# Patient Record
Sex: MALE | Race: BLACK OR AFRICAN AMERICAN | NOT HISPANIC OR LATINO | Employment: OTHER | URBAN - METROPOLITAN AREA
[De-identification: names, ages, dates, MRNs, and addresses within clinical notes are randomized per-mention and may not be internally consistent; named-entity substitution may affect disease eponyms.]

---

## 2017-02-21 ENCOUNTER — HOSPITAL ENCOUNTER (OUTPATIENT)
Dept: RADIOLOGY | Facility: CLINIC | Age: 67
Discharge: HOME | End: 2017-02-21
Payer: COMMERCIAL

## 2017-02-21 ENCOUNTER — ALLSCRIPTS OFFICE VISIT (OUTPATIENT)
Dept: OTHER | Facility: OTHER | Age: 67
End: 2017-02-21

## 2017-02-21 DIAGNOSIS — Z12.5 ENCOUNTER FOR SCREENING FOR MALIGNANT NEOPLASM OF PROSTATE: ICD-10-CM

## 2017-02-21 DIAGNOSIS — Z13.1 ENCOUNTER FOR SCREENING FOR DIABETES MELLITUS: ICD-10-CM

## 2017-02-21 DIAGNOSIS — M25.431 EFFUSION OF RIGHT WRIST: ICD-10-CM

## 2017-02-21 DIAGNOSIS — I10 ESSENTIAL (PRIMARY) HYPERTENSION: ICD-10-CM

## 2017-02-21 DIAGNOSIS — C61 MALIGNANT NEOPLASM OF PROSTATE (HCC): ICD-10-CM

## 2017-02-21 PROCEDURE — 73100 X-RAY EXAM OF WRIST: CPT

## 2017-02-23 ENCOUNTER — GENERIC CONVERSION - ENCOUNTER (OUTPATIENT)
Dept: OTHER | Facility: OTHER | Age: 67
End: 2017-02-23

## 2017-03-30 ENCOUNTER — ALLSCRIPTS OFFICE VISIT (OUTPATIENT)
Dept: OTHER | Facility: OTHER | Age: 67
End: 2017-03-30

## 2017-05-15 ENCOUNTER — APPOINTMENT (OUTPATIENT)
Dept: LAB | Facility: CLINIC | Age: 67
End: 2017-05-15
Payer: COMMERCIAL

## 2017-05-15 DIAGNOSIS — Z12.5 ENCOUNTER FOR SCREENING FOR MALIGNANT NEOPLASM OF PROSTATE: ICD-10-CM

## 2017-05-15 DIAGNOSIS — I10 ESSENTIAL (PRIMARY) HYPERTENSION: ICD-10-CM

## 2017-05-15 DIAGNOSIS — C61 MALIGNANT NEOPLASM OF PROSTATE (HCC): ICD-10-CM

## 2017-05-15 DIAGNOSIS — Z13.1 ENCOUNTER FOR SCREENING FOR DIABETES MELLITUS: ICD-10-CM

## 2017-05-15 LAB
ALBUMIN SERPL BCP-MCNC: 3.9 G/DL (ref 3.5–5)
ALP SERPL-CCNC: 115 U/L (ref 46–116)
ALT SERPL W P-5'-P-CCNC: 34 U/L (ref 12–78)
ANION GAP SERPL CALCULATED.3IONS-SCNC: 6 MMOL/L (ref 4–13)
AST SERPL W P-5'-P-CCNC: 33 U/L (ref 5–45)
BILIRUB SERPL-MCNC: 0.6 MG/DL (ref 0.2–1)
BUN SERPL-MCNC: 16 MG/DL (ref 5–25)
CALCIUM SERPL-MCNC: 9.2 MG/DL (ref 8.3–10.1)
CHLORIDE SERPL-SCNC: 104 MMOL/L (ref 100–108)
CHOLEST SERPL-MCNC: 197 MG/DL (ref 50–200)
CO2 SERPL-SCNC: 31 MMOL/L (ref 21–32)
CREAT SERPL-MCNC: 0.86 MG/DL (ref 0.6–1.3)
CREAT UR-MCNC: 144 MG/DL
ERYTHROCYTE [DISTWIDTH] IN BLOOD BY AUTOMATED COUNT: 16.3 % (ref 11.6–15.1)
EST. AVERAGE GLUCOSE BLD GHB EST-MCNC: 160 MG/DL
GFR SERPL CREATININE-BSD FRML MDRD: >60 ML/MIN/1.73SQ M
GLUCOSE P FAST SERPL-MCNC: 132 MG/DL (ref 65–99)
HBA1C MFR BLD: 7.2 % (ref 4.2–6.3)
HCT VFR BLD AUTO: 45.6 % (ref 36.5–49.3)
HDLC SERPL-MCNC: 78 MG/DL (ref 40–60)
HGB BLD-MCNC: 14.5 G/DL (ref 12–17)
LDLC SERPL CALC-MCNC: 104 MG/DL (ref 0–100)
MCH RBC QN AUTO: 24.1 PG (ref 26.8–34.3)
MCHC RBC AUTO-ENTMCNC: 31.8 G/DL (ref 31.4–37.4)
MCV RBC AUTO: 76 FL (ref 82–98)
MICROALBUMIN UR-MCNC: 5.7 MG/L (ref 0–20)
MICROALBUMIN/CREAT 24H UR: 4 MG/G CREATININE (ref 0–30)
PLATELET # BLD AUTO: 228 THOUSANDS/UL (ref 149–390)
PMV BLD AUTO: 10.6 FL (ref 8.9–12.7)
POTASSIUM SERPL-SCNC: 4.5 MMOL/L (ref 3.5–5.3)
PROT SERPL-MCNC: 7.7 G/DL (ref 6.4–8.2)
PSA SERPL-MCNC: 2.8 NG/ML (ref 0–4)
RBC # BLD AUTO: 6.02 MILLION/UL (ref 3.88–5.62)
SODIUM SERPL-SCNC: 141 MMOL/L (ref 136–145)
TRIGL SERPL-MCNC: 75 MG/DL
TSH SERPL DL<=0.05 MIU/L-ACNC: 0.75 UIU/ML (ref 0.36–3.74)
WBC # BLD AUTO: 5.21 THOUSAND/UL (ref 4.31–10.16)

## 2017-05-15 PROCEDURE — 85027 COMPLETE CBC AUTOMATED: CPT

## 2017-05-15 PROCEDURE — 84443 ASSAY THYROID STIM HORMONE: CPT

## 2017-05-15 PROCEDURE — 80053 COMPREHEN METABOLIC PANEL: CPT

## 2017-05-15 PROCEDURE — 82570 ASSAY OF URINE CREATININE: CPT

## 2017-05-15 PROCEDURE — 82043 UR ALBUMIN QUANTITATIVE: CPT

## 2017-05-15 PROCEDURE — 83036 HEMOGLOBIN GLYCOSYLATED A1C: CPT

## 2017-05-15 PROCEDURE — G0103 PSA SCREENING: HCPCS

## 2017-05-15 PROCEDURE — 36415 COLL VENOUS BLD VENIPUNCTURE: CPT

## 2017-05-15 PROCEDURE — 80061 LIPID PANEL: CPT

## 2017-05-16 ENCOUNTER — GENERIC CONVERSION - ENCOUNTER (OUTPATIENT)
Dept: OTHER | Facility: OTHER | Age: 67
End: 2017-05-16

## 2017-06-06 ENCOUNTER — ALLSCRIPTS OFFICE VISIT (OUTPATIENT)
Dept: OTHER | Facility: OTHER | Age: 67
End: 2017-06-06

## 2017-09-01 ENCOUNTER — APPOINTMENT (OUTPATIENT)
Dept: LAB | Facility: CLINIC | Age: 67
End: 2017-09-01
Payer: COMMERCIAL

## 2017-09-01 DIAGNOSIS — E66.9 OBESITY: ICD-10-CM

## 2017-09-01 DIAGNOSIS — R73.01 IMPAIRED FASTING GLUCOSE: ICD-10-CM

## 2017-09-01 LAB
ALBUMIN SERPL BCP-MCNC: 3.8 G/DL (ref 3.5–5)
ALP SERPL-CCNC: 109 U/L (ref 46–116)
ALT SERPL W P-5'-P-CCNC: 33 U/L (ref 12–78)
ANION GAP SERPL CALCULATED.3IONS-SCNC: 8 MMOL/L (ref 4–13)
AST SERPL W P-5'-P-CCNC: 23 U/L (ref 5–45)
BILIRUB SERPL-MCNC: 0.6 MG/DL (ref 0.2–1)
BUN SERPL-MCNC: 14 MG/DL (ref 5–25)
CALCIUM SERPL-MCNC: 9.2 MG/DL (ref 8.3–10.1)
CHLORIDE SERPL-SCNC: 103 MMOL/L (ref 100–108)
CO2 SERPL-SCNC: 29 MMOL/L (ref 21–32)
CREAT SERPL-MCNC: 0.91 MG/DL (ref 0.6–1.3)
ERYTHROCYTE [DISTWIDTH] IN BLOOD BY AUTOMATED COUNT: 16.1 % (ref 11.6–15.1)
EST. AVERAGE GLUCOSE BLD GHB EST-MCNC: 160 MG/DL
GFR SERPL CREATININE-BSD FRML MDRD: 88 ML/MIN/1.73SQ M
GLUCOSE P FAST SERPL-MCNC: 146 MG/DL (ref 65–99)
HBA1C MFR BLD: 7.2 % (ref 4.2–6.3)
HCT VFR BLD AUTO: 45 % (ref 36.5–49.3)
HGB BLD-MCNC: 14.3 G/DL (ref 12–17)
MCH RBC QN AUTO: 23.8 PG (ref 26.8–34.3)
MCHC RBC AUTO-ENTMCNC: 31.8 G/DL (ref 31.4–37.4)
MCV RBC AUTO: 75 FL (ref 82–98)
PLATELET # BLD AUTO: 217 THOUSANDS/UL (ref 149–390)
PMV BLD AUTO: 10.7 FL (ref 8.9–12.7)
POTASSIUM SERPL-SCNC: 4.2 MMOL/L (ref 3.5–5.3)
PROT SERPL-MCNC: 7.8 G/DL (ref 6.4–8.2)
RBC # BLD AUTO: 6.01 MILLION/UL (ref 3.88–5.62)
SODIUM SERPL-SCNC: 140 MMOL/L (ref 136–145)
WBC # BLD AUTO: 5.69 THOUSAND/UL (ref 4.31–10.16)

## 2017-09-01 PROCEDURE — 83036 HEMOGLOBIN GLYCOSYLATED A1C: CPT

## 2017-09-01 PROCEDURE — 80053 COMPREHEN METABOLIC PANEL: CPT

## 2017-09-01 PROCEDURE — 85027 COMPLETE CBC AUTOMATED: CPT

## 2017-09-01 PROCEDURE — 36415 COLL VENOUS BLD VENIPUNCTURE: CPT

## 2017-09-05 ENCOUNTER — GENERIC CONVERSION - ENCOUNTER (OUTPATIENT)
Dept: OTHER | Facility: OTHER | Age: 67
End: 2017-09-05

## 2017-09-19 ENCOUNTER — ALLSCRIPTS OFFICE VISIT (OUTPATIENT)
Dept: OTHER | Facility: OTHER | Age: 67
End: 2017-09-19

## 2017-10-02 ENCOUNTER — ALLSCRIPTS OFFICE VISIT (OUTPATIENT)
Dept: OTHER | Facility: OTHER | Age: 67
End: 2017-10-02

## 2017-10-02 ENCOUNTER — APPOINTMENT (OUTPATIENT)
Dept: LAB | Facility: AMBULARY SURGERY CENTER | Age: 67
End: 2017-10-02
Attending: UROLOGY
Payer: COMMERCIAL

## 2017-10-02 ENCOUNTER — TRANSCRIBE ORDERS (OUTPATIENT)
Dept: ADMINISTRATIVE | Facility: HOSPITAL | Age: 67
End: 2017-10-02

## 2017-10-02 DIAGNOSIS — R97.20 ELEVATED PROSTATE SPECIFIC ANTIGEN (PSA): ICD-10-CM

## 2017-10-02 DIAGNOSIS — R97.20 ELEVATED PROSTATE SPECIFIC ANTIGEN (PSA): Primary | ICD-10-CM

## 2017-10-02 LAB
ANION GAP SERPL CALCULATED.3IONS-SCNC: 9 MMOL/L (ref 4–13)
BUN SERPL-MCNC: 14 MG/DL (ref 5–25)
CALCIUM SERPL-MCNC: 9.6 MG/DL (ref 8.3–10.1)
CHLORIDE SERPL-SCNC: 106 MMOL/L (ref 100–108)
CO2 SERPL-SCNC: 28 MMOL/L (ref 21–32)
CREAT SERPL-MCNC: 0.8 MG/DL (ref 0.6–1.3)
GFR SERPL CREATININE-BSD FRML MDRD: 93 ML/MIN/1.73SQ M
GLUCOSE SERPL-MCNC: 118 MG/DL (ref 65–140)
POTASSIUM SERPL-SCNC: 4.2 MMOL/L (ref 3.5–5.3)
PSA SERPL-MCNC: 3.6 NG/ML (ref 0–4)
SODIUM SERPL-SCNC: 143 MMOL/L (ref 136–145)

## 2017-10-02 PROCEDURE — 80048 BASIC METABOLIC PNL TOTAL CA: CPT

## 2017-10-02 PROCEDURE — 36415 COLL VENOUS BLD VENIPUNCTURE: CPT

## 2017-10-02 PROCEDURE — 84153 ASSAY OF PSA TOTAL: CPT

## 2017-10-03 ENCOUNTER — GENERIC CONVERSION - ENCOUNTER (OUTPATIENT)
Dept: OTHER | Facility: OTHER | Age: 67
End: 2017-10-03

## 2017-10-03 NOTE — CONSULTS
Assessment  1  Prostate cancer (185) (C61)   2  Rising PSA level (790 93) (R97 20)    Plan  Rising PSA level    · (1) BASIC METABOLIC PROFILE; Status:Active; Requested FBC:43ISC0772;    Perform:[facility]; HBE:18CXN2879; Ordered; For:Rising PSA level; Ordered By:Marino Fajardo;   · (Q) PSA, POST-PROSTATECTOMY; Status:Active; Requested WIU:10EJL8320;    Perform:[facility]; NYF:89URM6770; Ordered; For:Rising PSA level; Ordered By:Marino Fajardo;   · * NM BONE SCAN WHOLE BODY; Status:Need Information - Financial Authorization; Requested [id number];    Perform:[facility]; Order Comments:Patient with history of prostate cancer status post radiation hormone therapy, rising PSA (naidr of PSA was 0 5 current PSA is 2 8); YWH:08SVQ3290; Ordered; For:Rising PSA level; Ordered By:Marino Fajardo;   · CT CHEST ABDOMEN PELVIS W WO CONTRAST; Status:Need Information - Financial  Authorization; Requested [id number];    Perform:[facility]; Order Comments:Patient with history of prostate cancer status post radiation hormone therapy, now with increasing PSA (biochemical recurrence) ordering scans for staging and post treatment setting; Due:2018; Ordered; For:Rising PSA level; Ordered By:Marino Fajardo;   · Follow-up visit in 1 month Evaluation and Treatment  Follow-up 1 month with Dr Tess Thomas  at the [facility] Office  Status: Hold For - Scheduling  Requested for: 58EYM4078   Ordered; For: Rising PSA level; Ordered By: Tasha Mccray Performed:  Due: 53JSX8579    Discussion/Summary  Discussion Summary:   I had a productive visit with Mr Cowan Nearing today  Unfortunately we do not have his pathology slides or his War parameters for his history of prostate cancer  But given that he has had radiation and androgen ablation in the past and now has a rising PSA level he meets criteria for biochemical recurrence of disease and may have a local recurrence as well   He has not had restaging or a repeat PSA recently, with his last imaging being done in 2004  did tell him about biochemical recurrence and what could mean in terms of his risk for future metastatic prostate cancer and need for additional therapies  We also discussed cryotherapy under discussed with him that 1 of our partners is an expert in cryotherapy, Dr Vanessa Damico, and that this may be a option after his staging workup has been repeated  PSA, BMP, CT scan chest abdomen pelvis, and a bone scan has been ordered today as part of his restaging for his increasing PSA  would turn to the clinic in 1 months time for a review of these findings as well as to schedule a prostate biopsy to look for recurrence of prostate cancer  Should he have recurrent disease he would not want a salvage prostatectomy which I did discuss with him at length today but would rather opt for cryotherapy also minus androgen deprivation therapy  Chief Complaint  Chief Complaint Free Text Note Form: Patient presents for a history of prostate cancer      History of Present Illness  HPI: Mr Abdirahman Uriarte is a 79-year-old gentleman who is referred to me for a history of prostate cancer   He is seen by Dr Richard Graham for his primary care needs  His other past medical history includes diabetes, hypertension, glaucoma, and obesity  Per his primary care notes he had radiation and chemical castration in the your kidneys been cancer free since 2009  His past surgical history includes colonoscopy, his family history includes diabetes, hypertension, and prostate cancer  He is  and is a never smoker and occasionally uses alcohol and does not use illicit drugs  He has no known drug allergies  was diagnosed around 2004 he did not want to undergo surgery as he does not like invasive therapies   He was treated at [facility] by Dr Felix Gonsalez (who is now retired) with radiation therapy followed by 2 years of chemical castration  He has not had a Lupron injection in quite some time   He states that his moris PSA after treatment was 0 5 nanograms/milliliter and his current PSA in May 2017 was 2 8 nanograms/milliliter representing a biochemical recurrence  He has no symptoms at this time and is voiding well other than getting up 2-3 times at night to urinate he has no dysuria, incontinence, hesitancy, gross hematuria, urgency he has an empty sensation after voiding and his stream quality is good  He is a retired  and is quite informed about his medical conditions and is concerned about his back chemical recurrence and inquired today about further workup and potential cryotherapy for recurrence of disease  AUA symptom score today is 4 indicating mild symptoms and he has mixed feelings about his quality of life with a quality of life score of 3   of his review of systems is negative  Review of Systems  Complete-Male Urology:   Constitutional: No fever or chills, feels well, no tiredness, no recent weight gain or weight loss  Respiratory: No complaints of shortness of breath, no wheezing, no cough, no SOB on exertion, no orthopnea or PND  Cardiovascular: No complaints of slow heart rate, no fast heart rate, no chest pain, no palpitations, no leg claudication, no lower extremity  Gastrointestinal: No complaints of abdominal pain, no constipation, no nausea or vomiting, no diarrhea or bloody stools  Genitourinary: Empty sensation-and-stream quality good, but-no dysuria,-no urinary hesitancy,-no hematuria,-no incontinence-and-no feelings of urinary urgency-   The patient presents with complaints of nocturia (2-3 times)  Musculoskeletal: No complaints of arthralgia, no myalgias, no joint swelling or stiffness, no limb pain or swelling  Integumentary: No complaints of skin rash or skin lesions, no itching, no skin wound, no dry skin  Hematologic/Lymphatic: No complaints of swollen glands, no swollen glands in the neck, does not bleed easily, no easy bruising     Neurological: No compliants of headache, no confusion, no convulsions, no numbness or tingling, no dizziness or fainting, no limb weakness, no difficulty walking  ROS Reviewed:   ROS reviewed  Active Problems  1  Abnormal fasting glucose (790 29) (R73 01)   2  Annual physical exam (V70 0) (Z00 00)   3  Benign essential hypertension (401 1) (I10)   4  Colon cancer screening (V76 51) (Z12 11)   5  Diabetes mellitus out of control (250 02) (E11 65)   6  Encounter for prostate cancer screening (V76 44) (Z12 5)   7  Glaucoma (365 9) (H40 9)   8  Obesity (BMI 30 0-34 9) (278 00) (E66 9)   9  Prostate cancer (185) (C61)   10  Sprain of right wrist, initial encounter (842 00) (S63 501A)   11  Swelling of joint, wrist, right (719 03) (M25 431)    Past Medical History  1  History of malignant neoplasm of prostate (V10 46) (Z85 46)  Active Problems And Past Medical History Reviewed: The active problems and past medical history were reviewed and updated today  Surgical History  1  History of Colonoscopy (Fiberoptic)  Surgical History Reviewed: The surgical history was reviewed and updated today  Family History  Mother    1  Family history of diabetes mellitus (V18 0) (Z83 3)   2  Family history of hypertension (V17 49) (Z82 49)  Father    3  Family history of diabetes mellitus (V18 0) (Z83 3)   4  Family history of hypertension (V17 49) (Z82 49)   5  Family history of malignant neoplasm of prostate (V16 42) (Z80 45)  Sister    10  Family history of diabetes mellitus (V18 0) (Z83 3)   7  Family history of hypertension (V17 49) (Z82 49)  Family History Reviewed: The family history was reviewed and updated today  Social History   · Alcohol use (V49 89) (Z78 9)   ·    · Never a smoker   · No illicit drug use   · Occupation   · Two children  Social History Reviewed: The social history was reviewed and updated today  The social history was reviewed and is unchanged  Current Meds   1  AmLODIPine Besylate 10 MG Oral Tablet;  Take 1 tablet daily; Therapy: 59Jsz5699 to (Evaluate:18Nov2017)  Requested for: 62Kra0079; Last   Rx:07Mov8014 Ordered   2  Aspir-81 81 MG Oral Tablet Delayed Release; take 1 tablet by mouth every day; Therapy: 51Zpw6054 to (Last Rx:27Jde9180)  Requested for: 18Wwk4053 Ordered   3  Latanoprost 0 005 % Ophthalmic Solution; INSTILL 1 DROP IN BOTH EYES AT BEDTIME   Recorded   4  MetFORMIN HCl - 850 MG Oral Tablet; Take 1 tablet twice daily; Therapy: 32Dxk1381 to (Evaluate:18Mar2018)  Requested for: 92Esu0493; Last   Rx:99Bej2275 Ordered  Medication List Reviewed: The medication list was reviewed and updated today  Allergies  1  No Known Drug Allergies    Vitals  Vital Signs    Recorded: 27FQN8071 09:13AM   Heart Rate 70   Systolic 675   Diastolic 86   Height 5 ft 10 in   Weight 220 lb 2 oz   BMI Calculated 31 58   BSA Calculated 2 17     Physical Exam    Constitutional   General appearance: No acute distress, well appearing and well nourished  Pulmonary   Respiratory effort: No increased work of breathing or signs of respiratory distress  Cardiovascular   Examination of extremities for edema and/or varicosities: Normal     Abdomen   Abdomen: Non-tender, no masses  Genitourinary   Anus and perineum: Normal     Scrotum contents: Normal size, no masses  Epididymis: Normal, no masses  Testes: Normal testes, no masses  Urethral meatus: Normal, no lesions  Penis: Abnormal  -Patient does have a 5 moderate ring of his foreskin, this was in paraphimosis on examined him and I reduce this and educated the patient on the need for keeping his foreskin reduced  Digital rectal exam of prostate: Normal size, no masses  -ZOILA shows a 30-40 gram prostate which is smooth without nodules  Digital rectal exam of seminal vesicles: Normal size, no masses  Anus, perineum, and rectum: Normal     Musculoskeletal   Gait and station: Normal     Digits and nails: Normal without clubbing or cyanosis  Inspection/palpation of joints, bones, and muscles: Normal     Skin   Skin and subcutaneous tissue: Normal without rashes or lesions  Lymphatic   Palpation of lymph nodes in groin: Normal     Neurologic   Cranial nerves: Cranial nerves 2-12 intact  Results/Data  AUA Symptom Score 02Oct2017 09:17AM User, Ahs     Test Name Result Flag Reference   AUA Symptom Score (for prostate disease) 4     Incomplete emptying: Not at all (0)  Frequency: Less than 1 time in 5 (1)  Intermittency: Not at all (0)  Urgency: Not at all (0)  Weak-stream: Not at all (0)  Straining: Not at all (0)  Nocturia: About half the time (3)   AUA Symptom Score (for prostate disease) - Quality of Life Due to Urinary Symptoms Mixed     AUA Symptom Score (for prostate disease) - Score Category Mild       Lab Studies Reviewed: Multiple labs, outside films, and records were reviewed for today's visit      Future Appointments    Date/Time Provider Specialty Site   12/21/2017 01:20 PM APPLE Garrido   [address] [facility]     Signatures   Electronically signed by : APPLE Stanley ; Oct  2 2017  9:35AM EST                       (Author)

## 2017-10-25 ENCOUNTER — HOSPITAL ENCOUNTER (OUTPATIENT)
Dept: NUCLEAR MEDICINE | Facility: HOSPITAL | Age: 67
Discharge: HOME | End: 2017-10-25
Attending: UROLOGY
Payer: MEDICARE

## 2017-10-25 ENCOUNTER — HOSPITAL ENCOUNTER (OUTPATIENT)
Dept: NUCLEAR MEDICINE | Facility: HOSPITAL | Age: 67
Discharge: HOME | End: 2017-10-25
Attending: UROLOGY
Payer: COMMERCIAL

## 2017-10-25 ENCOUNTER — HOSPITAL ENCOUNTER (OUTPATIENT)
Dept: CT IMAGING | Facility: HOSPITAL | Age: 67
Discharge: HOME | End: 2017-10-25
Attending: UROLOGY
Payer: MEDICARE

## 2017-10-25 DIAGNOSIS — R97.20 ELEVATED PROSTATE SPECIFIC ANTIGEN (PSA): ICD-10-CM

## 2017-10-25 PROCEDURE — 78306 BONE IMAGING WHOLE BODY: CPT

## 2017-10-25 PROCEDURE — 74177 CT ABD & PELVIS W/CONTRAST: CPT

## 2017-10-25 PROCEDURE — A9503 TC99M MEDRONATE: HCPCS

## 2017-10-25 PROCEDURE — 71260 CT THORAX DX C+: CPT

## 2017-10-25 RX ADMIN — IOHEXOL 100 ML: 350 INJECTION, SOLUTION INTRAVENOUS at 11:40

## 2017-10-31 ENCOUNTER — ALLSCRIPTS OFFICE VISIT (OUTPATIENT)
Dept: OTHER | Facility: OTHER | Age: 67
End: 2017-10-31

## 2017-11-07 ENCOUNTER — GENERIC CONVERSION - ENCOUNTER (OUTPATIENT)
Dept: OTHER | Facility: OTHER | Age: 67
End: 2017-11-07

## 2017-11-15 ENCOUNTER — TRANSCRIBE ORDERS (OUTPATIENT)
Dept: LAB | Facility: CLINIC | Age: 67
End: 2017-11-15

## 2017-11-15 ENCOUNTER — APPOINTMENT (OUTPATIENT)
Dept: LAB | Facility: CLINIC | Age: 67
End: 2017-11-15
Payer: COMMERCIAL

## 2017-11-15 ENCOUNTER — ALLSCRIPTS OFFICE VISIT (OUTPATIENT)
Dept: OTHER | Facility: OTHER | Age: 67
End: 2017-11-15

## 2017-11-15 DIAGNOSIS — R97.20 ELEVATED PROSTATE SPECIFIC ANTIGEN (PSA): ICD-10-CM

## 2017-11-15 DIAGNOSIS — R97.20 ELEVATED PROSTATE SPECIFIC ANTIGEN (PSA): Primary | ICD-10-CM

## 2017-11-15 LAB
ALBUMIN SERPL BCP-MCNC: 3.7 G/DL (ref 3.5–5)
ALP SERPL-CCNC: 112 U/L (ref 46–116)
ALT SERPL W P-5'-P-CCNC: 28 U/L (ref 12–78)
ANION GAP SERPL CALCULATED.3IONS-SCNC: 5 MMOL/L (ref 4–13)
AST SERPL W P-5'-P-CCNC: 20 U/L (ref 5–45)
BASOPHILS # BLD AUTO: 0.03 THOUSANDS/ΜL (ref 0–0.1)
BASOPHILS NFR BLD AUTO: 1 % (ref 0–1)
BILIRUB SERPL-MCNC: 0.4 MG/DL (ref 0.2–1)
BUN SERPL-MCNC: 16 MG/DL (ref 5–25)
CALCIUM SERPL-MCNC: 9 MG/DL (ref 8.3–10.1)
CHLORIDE SERPL-SCNC: 105 MMOL/L (ref 100–108)
CO2 SERPL-SCNC: 29 MMOL/L (ref 21–32)
CREAT SERPL-MCNC: 0.98 MG/DL (ref 0.6–1.3)
EOSINOPHIL # BLD AUTO: 0.07 THOUSAND/ΜL (ref 0–0.61)
EOSINOPHIL NFR BLD AUTO: 1 % (ref 0–6)
ERYTHROCYTE [DISTWIDTH] IN BLOOD BY AUTOMATED COUNT: 16.1 % (ref 11.6–15.1)
GFR SERPL CREATININE-BSD FRML MDRD: 92 ML/MIN/1.73SQ M
GLUCOSE SERPL-MCNC: 189 MG/DL (ref 65–140)
HCT VFR BLD AUTO: 43 % (ref 36.5–49.3)
HGB BLD-MCNC: 13.7 G/DL (ref 12–17)
LYMPHOCYTES # BLD AUTO: 1.64 THOUSANDS/ΜL (ref 0.6–4.47)
LYMPHOCYTES NFR BLD AUTO: 30 % (ref 14–44)
MCH RBC QN AUTO: 24 PG (ref 26.8–34.3)
MCHC RBC AUTO-ENTMCNC: 31.9 G/DL (ref 31.4–37.4)
MCV RBC AUTO: 75 FL (ref 82–98)
MONOCYTES # BLD AUTO: 0.54 THOUSAND/ΜL (ref 0.17–1.22)
MONOCYTES NFR BLD AUTO: 10 % (ref 4–12)
NEUTROPHILS # BLD AUTO: 3.22 THOUSANDS/ΜL (ref 1.85–7.62)
NEUTS SEG NFR BLD AUTO: 58 % (ref 43–75)
PLATELET # BLD AUTO: 214 THOUSANDS/UL (ref 149–390)
PMV BLD AUTO: 10.9 FL (ref 8.9–12.7)
POTASSIUM SERPL-SCNC: 4.3 MMOL/L (ref 3.5–5.3)
PROT SERPL-MCNC: 7.5 G/DL (ref 6.4–8.2)
RBC # BLD AUTO: 5.71 MILLION/UL (ref 3.88–5.62)
SODIUM SERPL-SCNC: 139 MMOL/L (ref 136–145)
WBC # BLD AUTO: 5.5 THOUSAND/UL (ref 4.31–10.16)

## 2017-11-15 PROCEDURE — 80053 COMPREHEN METABOLIC PANEL: CPT

## 2017-11-15 PROCEDURE — 85025 COMPLETE CBC W/AUTO DIFF WBC: CPT

## 2017-11-15 PROCEDURE — 36415 COLL VENOUS BLD VENIPUNCTURE: CPT

## 2017-11-16 NOTE — CONSULTS
Assessment  1  Prostate cancer (185) (C61)   2  Rising PSA level (790 93) (R97 20)    Plan  Rising PSA level    · (1) CBC/PLT/DIFF; Status:Active; Requested for:73Ckq2089; Perform:[facility]; LBH:50DAY0793; Last Updated By:Dk Díaz; 11/15/2017 10:19:29 AM;Ordered; For:Rising PSA level; Ordered By:Alondra Gallegos;   · (1) CBC/PLT/DIFF; Status:Active; Requested for:28Pqw7236; Perform:[facility]; IEX:75BCT5544; Last Updated By:Dk Díaz; 11/15/2017 10:19:36 AM;Ordered; For:Rising PSA level; Ordered By:Alondra Gallegos;   · (1) CBC/PLT/DIFF; Status:Active; Requested for:81Kfp0783;    Perform:[facility]; IWL:52ALK2761; Ordered;PSA level; Ordered By:Alondra Gallegos;   · (1) CBC/PLT/DIFF; Status:Complete; Requested for:Recurring Schedule: 11/15/2017;2/15/2018; 5/15/2018 ; Perform:[facility]; CDB:40QWW4781; Ordered; For:Rising PSA level; Ordered By:Alondra Gallegos;   · (1) COMPREHENSIVE METABOLIC PANEL; Status:Active; Requested for:78Qix9613; Perform:[facility]; ZKE:08ECW1198; Last Updated By:Dk Díaz; 11/15/2017 10:19:47 AM;Ordered; For:Rising PSA level; Ordered By:Alondra Gallegos;   · (1) COMPREHENSIVE METABOLIC PANEL; Status:Active; Requested for:77Kia3490; Perform:[facility]; VPR:26KPQ9953; Last Updated By:Dk Díaz; 11/15/2017 10:19:54 AM;Ordered; For:Rising PSA level; Ordered By:Alondra Gallegos;   · (1) COMPREHENSIVE METABOLIC PANEL; Status:Active; Requested for:09Jdc2955;    Perform:[facility]; VWG:23GYW9023; Ordered;PSA level; Ordered By:Alondra Gallegos;   · (1) COMPREHENSIVE METABOLIC PANEL; Status:Complete; Requested for:RecurringSchedule: 11/15/2017; 2/15/2018; 5/15/2018 ; Perform:[facility]; CPZ:23VHG2240; Ordered;PSA level; Ordered By:Alondra Gallegos;   · Follow-up visit in 3 months Evaluation and Treatment  lab todayFollow-up in 3 months  Status: Complete  Done: 17QXQ6983 09:40AM   Ordered; Rising PSA level; Ordered By: Fozia Bui Performed:  Due: 61VSI9005; Last Updated By: Kyle Hines; 11/15/2017 10:23:23 AM    Discussion/Summary    1, bone lesion, likely hormone sensitive metastatic prostate cancer: TxNxM1, with bone metastatic disease is on right pelvic bone; PSA doubled from 1 8-3 6 within 9 months; evaluated by Urology, plan to start [facility]  the bone lesion is on weight bearing area, recommended patient to receive Zometa to prevent pathological fracture  Side-effect of Zometa include causing hypocalcemia, jaw bone necrosis  Patient will see his dentist for clearance before Zometa  Planning to give Zometa monthly for 1 year then every 3 months  PSA to decrease after Firmagon started, if not, we will consider pelvic MRI or biopsy to confirm metastatic lesion  calcium vitamin-D has patient is now on Firmagonindication for chemo based on the Charrted trial as there is no visceral organ involvement and bone lesion is not diffuse ( < 4)CBC, CMP, PSA todayin 3 months  The patient, patient's family was counseled regarding diagnostic results  total time of encounter was 40 minutes-- and-- 20 minutes was spent counseling  Chief Complaint   I have prostate cancer       History of Present Illness  Patient is a 67-year-old gentleman,  male, with prior medical history of hypertension, prostate cancer early stage diagnosed 13 years ago at the age of 49, patient recall this is a Kami score 3+3 equal to 6, early to moderate stage, status post radiation therapy with Lupron for 6 months  He was initially managed by urologist in [facility], being followed on a yearly basis, until this urologist retired 4 years ago  He is having PSA checked every year by primary physician since then, last treated PSA was 1 8  PSA increased to 2 8 this year, he then presented with pelvic pain, CT and bone scan showed likely metastatic lesion, PSA elevated further to 3 6   Patient was evaluated by urologist Dr Mane Koenig, plan to start [facility], patient was sent to Medical Oncology for comanagement  came in today with his wife, he reported having intermittent back pain however he has a chronic sciatica going on for years, he did not feel any new symptoms  He otherwise remains in his baseline health status, he has no new headaches vision changes, chest pain, cough, hemoptysis, abdominal pain, nausea vomiting, change of urination or bowel movement habits, no bleeding, no lower extremity swelling, his weight and mobility remains the same  He is able to exercise on a daily basis, he has no fever chills, no lumps bumps  has no other cancers except prostate cancer in the past, is nonsmoker, drinks alcohol socially  Family history of multiple myeloma, no prostate cancer history  He retired , he relocated from [facility] to a [place], he has a coming vacation in [place] for about 3 months  Review of Systems   Constitutional: No fever or chills, feels well, no tiredness, no recent weight gain or weight loss  Eyes: No complaints of eye pain, no red eyes, no discharge from eyes, no itchy eyes  ENT: no complaints of earache, no hearing loss, no nosebleeds, no nasal discharge, no sore throat, no hoarseness  Cardiovascular: No complaints of slow heart rate, no fast heart rate, no chest pain, no palpitations, no leg claudication, no lower extremity  Respiratory: No complaints of shortness of breath, no wheezing, no cough, no SOB on exertion, no orthopnea or PND  Gastrointestinal: No complaints of abdominal pain, no constipation, no nausea or vomiting, no diarrhea or bloody stools  Genitourinary: No complaints of dysuria, no incontinence, no hesitancy, no nocturia, no genital lesion, no testicular pain  Musculoskeletal: No complaints of arthralgia, no myalgias, no joint swelling or stiffness, no limb pain or swelling  Integumentary: No complaints of skin rash or skin lesions, no itching, no skin wound, no dry skin    Neurological: No compliants of headache, no confusion, no convulsions, no numbness or tingling, no dizziness or fainting, no limb weakness, no difficulty walking  Psychiatric: Is not suicidal, no sleep disturbances, no anxiety or depression, no change in personality, no emotional problems  Endocrine: No complaints of proptosis, no hot flashes, no muscle weakness, no erectile dysfunction, no deepening of the voice, no feelings of weakness  Hematologic/Lymphatic: No complaints of swollen glands, no swollen glands in the neck, does not bleed easily, no easy bruising  Active Problems    1  Abnormal fasting glucose (790 29) (R73 01)   2  Annual physical exam (V70 0) (Z00 00)   3  Benign essential hypertension (401 1) (I10)   4  Colon cancer screening (V76 51) (Z12 11)   5  Diabetes mellitus out of control (250 02) (E11 65)   6  Encounter for prostate cancer screening (V76 44) (Z12 5)   7  Glaucoma (365 9) (H40 9)   8  Neoplasm of prostate, 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 (185,198 5) (C61,C79 51)   9  Obesity (BMI 30 0-34 9) (278 00) (E66 9)   10  Prostate cancer (185) (C61)   11  Rising PSA level (790 93) (R97 20)   12  Sprain of right wrist, initial encounter (842 00) (S63 501A)   13  Swelling of joint, wrist, right (719 03) (M25 431)    Past Medical History  1  History of malignant neoplasm of prostate (V10 46) (Z85 46)    The active problems and past medical history were reviewed and updated today  Surgical History  1  History of Colonoscopy (Fiberoptic)    The surgical history was reviewed and updated today  Family History  Mother    1  Family history of diabetes mellitus (V18 0) (Z83 3)   2  Family history of hypertension (V17 49) (Z82 49)  Father    3  Family history of cardiac disorder (V17 49) (Z82 49)   4  Family history of diabetes mellitus (V18 0) (Z83 3)   5  Family history of hypertension (V17 49) (Z82 49)   6   Family history of malignant neoplasm of prostate (L04 32) (Z80 45)  Sister 7  Family history of diabetes mellitus (V18 0) (Z83 3)   8  Family history of hypertension (V17 49) (Z82 49)  Brother    5  Family history of multiple myeloma (V16 7) (Z80 7)    The family history was reviewed and updated today  Social History     · Alcohol use (V49 89) (Z78 9)   ·    · Never a smoker   · No illicit drug use   · Occupation   · Retired   ·  from spouse (V61 03) (Z63 5)   · Two children  The social history was reviewed and updated today  Current Meds   1  AmLODIPine Besylate 10 MG Oral Tablet; Take 1 tablet daily; Therapy: 13Rcy8285 to (Evaluate:18Nov2017)  Requested for: 33Uvf1395; Last Rx:09Tmy4638 Ordered   2  CoQ10 100 MG Oral Capsule; Therapy: (Recorded:54Uhu9905) to Recorded   3  MegaRed Omega-3 Krill Oil CAPS; Therapy: (Recorded:41Kis2401) to Recorded   4  MetFORMIN HCl - 850 MG Oral Tablet; Take 1 tablet twice daily; Therapy: 27Fbk1025 to (Evaluate:18Mar2018)  Requested for: 57Stx5654; Last Rx:11Qdu9036 Ordered   5  Saw Palmetto (Serenoa repens) 450 MG CAPS; Therapy: (Recorded:35Twc4824) to Recorded    Allergies  1  No Known Drug Allergies    Vitals  Vital Signs    Recorded: 85HZL1361 09:41AM   Temperature 97 2 F   Heart Rate 72   Respiration 16   Systolic 124   Diastolic 80   Height 5 ft 10 in   Weight 219 lb    BMI Calculated 31 42   BSA Calculated 2 17   O2 Saturation 98   Pain Scale 2       Physical Exam   Constitutional  General appearance: No acute distress, well appearing and well nourished  Eyes  Conjunctiva and lids: No swelling, erythema, or discharge  Ears, Nose, Mouth, and Throat  External inspection of ears and nose: Normal    Pulmonary  Respiratory effort: No increased work of breathing or signs of respiratory distress  Auscultation of lungs: Clear to auscultation, equal breath sounds bilaterally, no wheezes, no rales, no rhonci  Cardiovascular  Palpation of heart: Normal PMI, no thrills     Auscultation of heart: Normal rate and rhythm, normal S1 and S2, without murmurs  Examination of extremities for edema and/or varicosities: Normal    Carotid pulses: Normal    Abdomen  Abdomen: Non-tender, no masses  Liver and spleen: No hepatomegaly or splenomegaly  Lymphatic  Palpation of lymph nodes in neck: No lymphadenopathy  Musculoskeletal  Gait and station: Normal    Neurologic  Cranial nerves: Cranial nerves 2-12 intact  Reflexes: 2+ and symmetric  Sensation: No sensory loss  Psychiatric  Orientation to person, place and time: Normal    Mood and affect: Normal         ECOG 1       Future Appointments    Date/Time Provider Specialty Site   02/13/2018 09:40 AM Lottie Montano MD Hematology Oncology [facility] [place]   12/21/2017 01:20 PM APPLE Batres  [facility] [place] Patient   04/16/2018 10:30 AM APPLE Bonner   Urology [address]       Signatures   Electronically signed by : Naty Shaw MD; Nov 15 2017 10:58AM EST                       (Author)

## 2017-12-18 DIAGNOSIS — E11.65 TYPE 2 DIABETES MELLITUS WITH HYPERGLYCEMIA (HCC): ICD-10-CM

## 2018-01-10 NOTE — RESULT NOTES
Verified Results  (1) CBC/ PLT (NO DIFF) 01Sep2017 09:10AM Luzmaria Vergara Order Number: [id number]     Test Name Result Flag Reference   HEMATOCRIT 45 0 %  36 5-49 3   HEMOGLOBIN 14 3 g/dL  12 0-17 0   MCHC 31 8 g/dL  31 4-37 4   MCH 23 8 pg L 26 8-34 3   MCV 75 fL L 82-98   PLATELET COUNT 936 Thousands/uL  149-390   RBC COUNT 6 01 Million/uL H 3 88-5 62   RDW 16 1 % H 11 6-15 1   WBC COUNT 5 69 Thousand/uL  4 31-10 16   MPV 10 7 fL  8 9-12 7     (1) COMPREHENSIVE METABOLIC PANEL 72LVX4045 13:01EN Luzmaria Vergara Order Number: [id number]     Test Name Result Flag Reference   SODIUM 140 mmol/L  136-145   POTASSIUM 4 2 mmol/L  3 5-5 3   CHLORIDE 103 mmol/L  100-108   CARBON DIOXIDE 29 mmol/L  21-32   ANION GAP (CALC) 8 mmol/L  4-13   BLOOD UREA NITROGEN 14 mg/dL  5-25   CREATININE 0 91 mg/dL  0 60-1 30   Standardized to IDMS reference method   CALCIUM 9 2 mg/dL  8 3-10 1   BILI, TOTAL 0 60 mg/dL  0 20-1 00   ALK PHOSPHATAS 109 U/L     ALT (SGPT) 33 U/L  12-78   Specimen collection should occur prior to Sulfasalazine administration due to the potential for falsely depressed results  AST(SGOT) 23 U/L  5-45   Specimen collection should occur prior to Sulfasalazine administration due to the potential for falsely depressed results  ALBUMIN 3 8 g/dL  3 5-5 0   TOTAL PROTEIN 7 8 g/dL  6 4-8 2   eGFR 88 ml/min/1 73sq m     National Kidney Disease Education Program recommendations are as follows:  GFR calculation is accurate only with a steady state creatinine  Chronic Kidney disease less than 60 ml/min/1 73 sq  meters  Kidney failure less than 15 ml/min/1 73 sq  meters  GLUCOSE FASTING 146 mg/dL H 65-99   Specimen collection should occur prior to Sulfasalazine administration due to the potential for falsely depressed results  Specimen collection should occur prior to Sulfapyridine administration due to the potential for falsely elevated results       (1) HEMOGLOBIN A1C 01Sep2017 09:10AM Demetrice Hui Order Number: [id number]     Test Name Result Flag Reference   HEMOGLOBIN A1C 7 2 % H 4 2-6 3   EST  AVG   GLUCOSE 160 mg/dl

## 2018-01-12 VITALS
HEIGHT: 70 IN | RESPIRATION RATE: 16 BRPM | BODY MASS INDEX: 30.78 KG/M2 | DIASTOLIC BLOOD PRESSURE: 70 MMHG | HEART RATE: 78 BPM | WEIGHT: 215 LBS | SYSTOLIC BLOOD PRESSURE: 130 MMHG

## 2018-01-13 NOTE — RESULT NOTES
Verified Results  XR WRIST 2 VIEW RIGHT [id number] 12:07PM Franny Cramer Order Number: [id number]     Test Name Result Flag Reference   XR WRIST 2 VW RIGHT (Report)     RIGHT WRIST     INDICATION:  Anterior distal forearm swelling, patient fell a few weeks ago  COMPARISON: None     VIEWS: 2; 2 images     FINDINGS:     There is no acute fracture or dislocation  No degenerative changes  No lytic or blastic lesions are seen  Soft tissues are unremarkable  IMPRESSION:     No acute osseous abnormality  Workstation performed: [id number]     Signed by:   Lilliana Francisco DO   2/22/17       Plan  Swelling of joint, wrist, right    · *1 - [facility] Referral  Consult Only: the expectation is  that the referring provider will communicate back to the patient on treatment options  Evaluation and Treatment: the expectation is that the referred to provider will  communicate back to the patient on treatment options    Status: Active  Requested for:  71Rll5929  Care Summary provided  : Yes

## 2018-01-13 NOTE — MISCELLANEOUS
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

## 2018-01-14 VITALS
RESPIRATION RATE: 16 BRPM | OXYGEN SATURATION: 98 % | TEMPERATURE: 97.2 F | WEIGHT: 219 LBS | BODY MASS INDEX: 31.35 KG/M2 | DIASTOLIC BLOOD PRESSURE: 80 MMHG | SYSTOLIC BLOOD PRESSURE: 130 MMHG | HEIGHT: 70 IN | HEART RATE: 72 BPM

## 2018-01-14 VITALS
RESPIRATION RATE: 16 BRPM | SYSTOLIC BLOOD PRESSURE: 160 MMHG | DIASTOLIC BLOOD PRESSURE: 90 MMHG | HEIGHT: 70 IN | WEIGHT: 218 LBS | HEART RATE: 86 BPM | BODY MASS INDEX: 31.21 KG/M2

## 2018-01-14 VITALS
SYSTOLIC BLOOD PRESSURE: 133 MMHG | DIASTOLIC BLOOD PRESSURE: 80 MMHG | WEIGHT: 217 LBS | HEIGHT: 70 IN | HEART RATE: 75 BPM | BODY MASS INDEX: 31.07 KG/M2

## 2018-01-14 NOTE — PROGRESS NOTES
Assessment    1  Annual physical exam (V70 0) (Z00 00)   2  History of malignant neoplasm of prostate (V10 46) (Z85 46)   3  Colon cancer screening (V76 51) (Z12 11)   4  Glaucoma (365 9) (H40 9)   5  Obesity (BMI 30 0-34 9) (278 00) (E66 9)   6  Diabetes mellitus out of control (250 02) (E11 65)    Plan   Abnormal fasting glucose, Obesity (BMI 30 0-34  9)    · (1) HEMOGLOBIN A1C; Status:Active; Requested for:31Dda6465; Annual physical exam, Prostate cancer    · *1 -  [facility]  *  Status: Active  Requested for:  61JLD8142  Care Summary provided  : Yes  Colon cancer screening    · *1 -  [facility]  *  Status: Active   Requested for: 61JGS9523  Care Summary provided  : Yes   · COLONOSCOPY; Status:Active; Requested IUY:57CDV0623;   Diabetes mellitus out of control    · *VB - Foot Exam; Status:Active; Requested PUT:61OEQ9115;   Diabetes mellitus out of control, Obesity (BMI 30 0-34  9)    · 1 - Christin Abad MD, [facility]  (General Surgery) Co-Management  *  Status: Active  Requested  for: 71DEG5492  Care Summary provided  : Yes  Obesity (BMI 30 0-34  9)    · (1) CBC/ PLT (NO DIFF); Status:Active; Requested for:01Sep2017;    · (1) COMPREHENSIVE METABOLIC PANEL; Status:Active; Requested for:97Tkr3504; Bogdan Hua (Optometry) Co-Management  *  Status: Hold For - Scheduling  Requested for: 70ZNW6313  Ordered; For: Glaucoma; Ordered By: Moreno Guerrero  Performed:   Due: 53TPN2407  Follow-up visit in 3 months Evaluation and Treatment  Follow-up  Status: Hold For - Scheduling  Requested for: 10HWW7780  Ordered; For: Abnormal fasting glucose;  Ordered By: Moreno Guerrero  Performed:   Due: 81ZJX5131     Discussion/Summary  Impression: health maintenance visit  Currently, he eats a healthy diet  Prostate cancer screening: prostate cancer screening is current  Colorectal cancer screening: colonoscopy has been ordered   Screening lab work includes glucose and lipid profile  The immunizations are needed and pt wants next time  He was advised to be evaluated by an ophthalmologist  Advice and education were given regarding nutrition and aerobic exercise  Patient discussion: discussed with the patient  He is diabetic A1c 7 2, improved from last year 7 6, discussed to start medication like metformin, he is not willing to start medicine he will work on his diet and weight loss further by joining a weight loss program,  And he says he can follow-up with labs and see how the things go, he is a marathon runner,  Hypertension, on amlodipine,  He does not want vaccines today he says he will get it next time, discussed about Adacel and Pneumovax  The patient was counseled regarding diagnostic results, instructions for management, prognosis, impressions, risks and benefits of treatment options, importance of compliance with treatment  Chief Complaint  PREVENTATIVE, REVIEW LABS      History of Present Illness  , Adult Male: The patient is being seen for a health maintenance evaluation  Social History: He is   Work status: retired  The patient has never smoked cigarettes  He reports drinking 2 drinks per week  He has never used illicit drugs  General Health:   Lifestyle:  He exercises regularly  runner  Screening:   HPI: Physical  He had labs and he is diabetic he says his A1c used to be 10 and he is bringing it down with exercise and diet,  And he knows he is obese he wants to lose weight and wants to join the team for weight loss, rather than taking medicine for diabetes control, he says last year his A1c went down to 7 6 and this year it 7 2,  He has history of prostate cancer, and he wants to see urologist here ,previously treated  no surgery done      Review of Systems    Constitutional: No fever or chills, feels well, no tiredness, no recent weight gain or weight loss  Eyes: No complaints of eye pain, no red eyes, no discharge from eyes, no itchy eyes  ENT: no complaints of earache, no hearing loss, no nosebleeds, no nasal discharge, no sore throat, no hoarseness  Cardiovascular: No complaints of slow heart rate, no fast heart rate, no chest pain, no palpitations, no leg claudication, no lower extremity  Respiratory: No complaints of shortness of breath, no wheezing, no cough, no SOB on exertion, no orthopnea or PND  Gastrointestinal: No complaints of abdominal pain, no constipation, no nausea or vomiting, no diarrhea or bloody stools  Genitourinary: No complaints of dysuria, no incontinence, no hesitancy, no nocturia, no genital lesion, no testicular pain  Musculoskeletal: No complaints of arthralgia, no myalgias, no joint swelling or stiffness, no limb pain or swelling  Integumentary: No complaints of skin rash or skin lesions, no itching, no skin wound, no dry skin  Neurological: No compliants of headache, no confusion, no convulsions, no numbness or tingling, no dizziness or fainting, no limb weakness, no difficulty walking  Psychiatric: Is not suicidal, no sleep disturbances, no anxiety or depression, no change in personality, no emotional problems  Endocrine: No complaints of proptosis, no hot flashes, no muscle weakness, no erectile dysfunction, no deepening of the voice, no feelings of weakness  Hematologic/Lymphatic: No complaints of swollen glands, no swollen glands in the neck, does not bleed easily, no easy bruising  ROS reviewed  Active Problems    1  Benign essential hypertension (401 1) (I10)   2  Encounter for prostate cancer screening (V76 44) (Z12 5)   3  Glaucoma (365 9) (H40 9)   4  Prostate cancer (185) (C61)   5  Sprain of right wrist, initial encounter (842 00) (S63 501A)   6   Swelling of joint, wrist, right (719 03) (M25 431)    Past Medical History    · History of malignant neoplasm of prostate (V10 46) (Z85 46)    Surgical History    · History of Colonoscopy (Fiberoptic)    Family History  Father    · Family history of diabetes mellitus (V18 0) (Z83 3)   · Family history of hypertension (V17 49) (Z82 49)   · Family history of malignant neoplasm of prostate (V16 42) (Z80 42)    Social History    · Alcohol use (V49 89) (Z78 9)   · 2 bear per wk   ·    · Never a smoker   · No illicit drug use   · Occupation   · , retired now   · Two children    Current Meds   1  AmLODIPine Besylate 10 MG Oral Tablet; Take 1 tablet daily  Requested for:   28AAA1076; Last XH:93BTC0658 Ordered   2  Latanoprost 0 005 % Ophthalmic Solution; INSTILL 1 DROP IN BOTH EYES AT   BEDTIME Recorded    Allergies    1  No Known Drug Allergies    Vitals   Recorded: 12UBP8147 10:01AM Recorded: 06Jun2017 09:42AM   Heart Rate  86   Respiration  16   Systolic 021 024   Diastolic 78 70   Height  5 ft 10 in   Weight  221 lb    BMI Calculated  31 71   BSA Calculated  2 18     Physical Exam    Socks and shoes removed, Right Foot Findings: normal foot, no swelling, no erythema  The right toes were normal and had full range of motion  The sensory exam showed normal vibratory sensation at the level of the toes on the right  Normal tactile sensation with monofilament testing throughout the right foot  Socks and shoes removed, Left Foot Findings: normal foot, no swelling, no erythema  The left toes were normal and had full range of motion  The sensory exam showed normal vibratory sensation at the level of the toes on the left  Normal tactile sensation with monofilament testing throughout the left foot  Pulses:   2+ in the dorsalis pedis on the right  Pulses:   2+ in the dorsalis pedis on the left  Constitutional   General appearance: No acute distress, well appearing and well nourished  Head and Face   Head and face: Normal     Palpation of the face and sinuses: No sinus tenderness  Eyes   Conjunctiva and lids: No erythema, swelling or discharge  Pupils and irises: Equal, round, reactive to light      Ophthalmoscopic examination: Normal fundi and optic discs  Ears, Nose, Mouth, and Throat   External inspection of ears and nose: Normal     Otoscopic examination: Tympanic membranes translucent with normal light reflex  Canals patent without erythema  Hearing: Normal     Nasal mucosa, septum, and turbinates: Normal without edema or erythema  Lips, teeth, and gums: Normal, good dentition  Oropharynx: Normal with no erythema, edema, exudate or lesions  Neck   Neck: Supple, symmetric, trachea midline, no masses  Thyroid: Normal, no thyromegaly  Pulmonary   Respiratory effort: No increased work of breathing or signs of respiratory distress  Percussion of chest: Normal     Palpation of chest: Normal     Auscultation of lungs: Clear to auscultation  Cardiovascular   Palpation of heart: Normal PMI, no thrills  Auscultation of heart: Normal rate and rhythm, normal S1 and S2, no murmurs  Carotid pulses: 2+ bilaterally  Abdominal aorta: Normal     Femoral pulses: 2+ bilaterally  Pedal pulses: 2+ bilaterally  Examination of extremities for edema and/or varicosities: Normal     Chest   Breasts: Normal, no dimpling or skin changes appreciated  Palpation of breasts and axillae: Normal, no masses palpated  Chest: Normal     Abdomen   Abdomen: Non-tender, no masses  Liver and spleen: No hepatomegaly or splenomegaly  Lymphatic   Palpation of lymph nodes in neck: No lymphadenopathy  Palpation of lymph nodes in other areas: No lymphadenopathy  Musculoskeletal   Gait and station: Normal     Inspection/palpation of digits and nails: Normal without clubbing or cyanosis  Inspection/palpation of joints, bones, and muscles: Normal     Range of motion: Normal     Stability: Normal     Muscle strength/tone: Normal     Skin   Skin and subcutaneous tissue: Normal without rashes or lesions  Palpation of skin and subcutaneous tissue: Normal turgor      Neurologic   Cranial nerves: Cranial nerves 2-12 intact  Reflexes: 2+ and symmetric  Sensation: No sensory loss  Psychiatric   Judgment and insight: Normal     Orientation to person, place and time: Normal     Recent and remote memory: Intact  Mood and affect: Normal        Results/Data  (1) CBC/ PLT (NO DIFF) [id number] 10:26AM [employer] Order Number: [id number]     Test Name Result Flag Reference   HEMATOCRIT 45 6 %  36 5-49 3   HEMOGLOBIN 14 5 g/dL  12 0-17 0   MCHC 31 8 g/dL  31 4-37 4   MCH 24 1 pg L 26 8-34 3   MCV 76 fL L 82-98   PLATELET COUNT 545 Thousands/uL  149-390   RBC COUNT 6 02 Million/uL H 3 88-5 62   RDW 16 3 % H 11 6-15 1   WBC COUNT 5 21 Thousand/uL  4 31-10 16   MPV 10 6 fL  8 9-12 7     (1) COMPREHENSIVE METABOLIC PANEL [id number] 21:22CS [employer] Order Number: [id number]     Test Name Result Flag Reference   SODIUM 141 mmol/L  136-145   POTASSIUM 4 5 mmol/L  3 5-5 3   CHLORIDE 104 mmol/L  100-108   CARBON DIOXIDE 31 mmol/L  21-32   ANION GAP (CALC) 6 mmol/L  4-13   BLOOD UREA NITROGEN 16 mg/dL  5-25   CREATININE 0 86 mg/dL  0 60-1 30   Standardized to IDMS reference method   CALCIUM 9 2 mg/dL  8 3-10 1   BILI, TOTAL 0 60 mg/dL  0 20-1 00   ALK PHOSPHATAS 115 U/L     ALT (SGPT) 34 U/L  12-78   AST(SGOT) 33 U/L  5-45   ALBUMIN 3 9 g/dL  3 5-5 0   TOTAL PROTEIN 7 7 g/dL  6 4-8 2   eGFR Non-African American      >60 0 ml/min/1 73sq m   [facility] Disease Education Program recommendations are as follows:  GFR calculation is accurate only with a steady state creatinine  Chronic Kidney disease less than 60 ml/min/1 73 sq  meters  Kidney failure less than 15 ml/min/1 73 sq  meters  GLUCOSE FASTING 132 mg/dL H 65-99     (1) HEMOGLOBIN A1C [id number] 10:26AM [employer] Order Number: [id number]     Test Name Result Flag Reference   HEMOGLOBIN A1C 7 2 % H 4 2-6 3   EST  AVG   GLUCOSE 160 mg/dl       (1) LIPID PANEL, FASTING [id number] 10:26AM Karly PAREDES Order Number: [id number]     Test Name Result Flag Reference   CHOLESTEROL 197 mg/dL     HDL,DIRECT 78 mg/dL H 40-60   Specimen collection should occur prior to Metamizole administration due to the potential for falsely depressed results  LDL CHOLESTEROL CALCULATED 104 mg/dL H 0-100   - Patient Instructions: This is a fasting blood test  Water,black tea or black  coffee only after 9:00pm the night before test   Drink 2 glasses of water the morning of test       Triglyceride:         Normal              <150 mg/dl       Borderline High    150-199 mg/dl       High               200-499 mg/dl       Very High          >499 mg/dl  Cholesterol:         Desirable        <200 mg/dl      Borderline High  200-239 mg/dl      High             >239 mg/dl  HDL Cholesterol:        High    >59 mg/dL      Low     <41 mg/dL  LDL CALCULATED:    This screening LDL is a calculated result  It does not have the accuracy of the Direct Measured LDL in the monitoring of patients with hyperlipidemia and/or statin therapy  Direct Measure LDL (JQR222) must be ordered separately in these patients  TRIGLYCERIDES 75 mg/dL  <=150   Specimen collection should occur prior to N-Acetylcysteine or Metamizole administration due to the potential for falsely depressed results  (1) MICROALBUMIN CREATININE RATIO, RANDOM URINE [id number] 10:26AM [employer] Order Number: [id number]     Test Name Result Flag Reference   MICROALBUMIN/ CREAT R 4 mg/g creatinine  0-30   MICROALBUMIN,URINE 5 7 mg/L  0 0-20 0   CREATININE URINE 144 0 mg/dL       (1) TSH [id number] 10:26AM [employer] Order Number: [id number]     Test Name Result Flag Reference   TSH 0 746 uIU/mL  0 358-3 740   - Patient Instructions: This bloodwork is non-fasting  Please drink two glasses of water morning of bloodwork  Patients undergoing fluorescein dye angiography may retain small amounts of fluorescein in the body for 48-72 hours post procedure  Samples containing fluorescein can produce falsely depressed TSH values  If the patient had this procedure,a specimen should be resubmitted post fluorescein clearance  (1) PSA (SCREEN) (Dx V76 44 Screen for Prostate Cancer) [id number] 10:26AM Ilya Barrera Order Number: [id number]     Test Name Result Flag Reference   PROSTATE SPECIFIC ANTIGEN 2 8 ng/mL  0 0-4 0   American Urological Association Guidelines define biochemical recurrence of prostate cancer as a detectable or rising PSA value post-radical prostatectomy that is greater than or equal to 0 2 ng/mL with a second confirmatory level of greater than or equal to 0 2 ng/mL  - Patient Instructions: This test is non-fasting  Please drink two glasses of water morning of bloodwork         Procedure    Procedure:   Results: 20/20 in the right eye with corrective device, 20/20 in the left eye with corrective device      Signatures   Electronically signed by : APPLE Gunderson ; Jun 6 2017 10:24AM EST                       (Author)

## 2018-01-15 VITALS
WEIGHT: 220.13 LBS | DIASTOLIC BLOOD PRESSURE: 86 MMHG | BODY MASS INDEX: 31.51 KG/M2 | HEART RATE: 70 BPM | HEIGHT: 70 IN | SYSTOLIC BLOOD PRESSURE: 142 MMHG

## 2018-01-16 NOTE — RESULT NOTES
Verified Results  (1) CBC/ PLT (NO DIFF) [id number] 10:26AM [employer] Order Number: [id number]     Test Name Result Flag Reference   HEMATOCRIT 45 6 %  36 5-49 3   HEMOGLOBIN 14 5 g/dL  12 0-17 0   MCHC 31 8 g/dL  31 4-37 4   MCH 24 1 pg L 26 8-34 3   MCV 76 fL L 82-98   PLATELET COUNT 321 Thousands/uL  149-390   RBC COUNT 6 02 Million/uL H 3 88-5 62   RDW 16 3 % H 11 6-15 1   WBC COUNT 5 21 Thousand/uL  4 31-10 16   MPV 10 6 fL  8 9-12 7     (1) COMPREHENSIVE METABOLIC PANEL [id number] 86:42OB [employer] Order Number: [id number]     Test Name Result Flag Reference   SODIUM 141 mmol/L  136-145   POTASSIUM 4 5 mmol/L  3 5-5 3   CHLORIDE 104 mmol/L  100-108   CARBON DIOXIDE 31 mmol/L  21-32   ANION GAP (CALC) 6 mmol/L  4-13   BLOOD UREA NITROGEN 16 mg/dL  5-25   CREATININE 0 86 mg/dL  0 60-1 30   Standardized to IDMS reference method   CALCIUM 9 2 mg/dL  8 3-10 1   BILI, TOTAL 0 60 mg/dL  0 20-1 00   ALK PHOSPHATAS 115 U/L     ALT (SGPT) 34 U/L  12-78   AST(SGOT) 33 U/L  5-45   ALBUMIN 3 9 g/dL  3 5-5 0   TOTAL PROTEIN 7 7 g/dL  6 4-8 2   eGFR Non-African American      >60 0 ml/min/1 73sq [facility] Disease Education Program recommendations are as follows:  GFR calculation is accurate only with a steady state creatinine  Chronic Kidney disease less than 60 ml/min/1 73 sq  meters  Kidney failure less than 15 ml/min/1 73 sq  meters  GLUCOSE FASTING 132 mg/dL H 65-99     (1) HEMOGLOBIN A1C [id number] 10:26AM [employer] Order Number: [id number]     Test Name Result Flag Reference   HEMOGLOBIN A1C 7 2 % H 4 2-6 3   EST  AVG   GLUCOSE 160 mg/dl       (1) LIPID PANEL, FASTING [id number] 10:26AM [employer] Order Number: [id number]     Test Name Result Flag Reference   CHOLESTEROL 197 mg/dL     HDL,DIRECT 78 mg/dL H 40-60   Specimen collection should occur prior to Metamizole administration due to the potential for falsely depressed results  LDL CHOLESTEROL CALCULATED 104 mg/dL H 0-100   - Patient Instructions: This is a fasting blood test  Water,black tea or black  coffee only after 9:00pm the night before test   Drink 2 glasses of water the morning of test       Triglyceride:         Normal              <150 mg/dl       Borderline High    150-199 mg/dl       High               200-499 mg/dl       Very High          >499 mg/dl  Cholesterol:         Desirable        <200 mg/dl      Borderline High  200-239 mg/dl      High             >239 mg/dl  HDL Cholesterol:        High    >59 mg/dL      Low     <41 mg/dL  LDL CALCULATED:    This screening LDL is a calculated result  It does not have the accuracy of the Direct Measured LDL in the monitoring of patients with hyperlipidemia and/or statin therapy  Direct Measure LDL (JFK109) must be ordered separately in these patients  TRIGLYCERIDES 75 mg/dL  <=150   Specimen collection should occur prior to N-Acetylcysteine or Metamizole administration due to the potential for falsely depressed results  (1) MICROALBUMIN CREATININE RATIO, RANDOM URINE [id number] 10:26AM [employer] Order Number: [id number]     Test Name Result Flag Reference   MICROALBUMIN/ CREAT R 4 mg/g creatinine  0-30   MICROALBUMIN,URINE 5 7 mg/L  0 0-20 0   CREATININE URINE 144 0 mg/dL       (1) TSH [id number] 10:26AM [employer] Order Number: [id number]     Test Name Result Flag Reference   TSH 0 746 uIU/mL  0 358-3 740   - Patient Instructions: This bloodwork is non-fasting  Please drink two glasses of water morning of bloodwork  Patients undergoing fluorescein dye angiography may retain small amounts of fluorescein in the body for 48-72 hours post procedure  Samples containing fluorescein can produce falsely depressed TSH values  If the patient had this procedure,a specimen should be resubmitted post fluorescein clearance       (1) PSA (SCREEN) (Dx V76 44 Screen for Prostate Cancer) [id number] 10:26AM Marich[employer] Fraga Order Number: [id number]     Test Name Result Flag Reference   PROSTATE SPECIFIC ANTIGEN 2 8 ng/mL  0 0-4 0   American Urological Association Guidelines define biochemical recurrence of prostate cancer as a detectable or rising PSA value post-radical prostatectomy that is greater than or equal to 0 2 ng/mL with a second confirmatory level of greater than or equal to 0 2 ng/mL  - Patient Instructions: This test is non-fasting  Please drink two glasses of water morning of bloodwork

## 2018-01-17 NOTE — CONSULTS
Chief Complaint  " I have prostate cancer "      History of Present Illness  Patient is a 60-year-old gentleman,  male, with prior medical history of hypertension, prostate cancer early stage diagnosed 13 years ago at the age of 49, patient recall this is a Kami score 3+3 equal to 6, early to moderate stage, status post radiation therapy with Lupron for 6 months  He was initially managed by urologist in [facility], being followed on a yearly basis, until this urologist retired 4 years ago  He is having PSA checked every year by primary physician since then, last treated PSA was 1 8  PSA increased to 2 8 this year, he then presented with pelvic pain, CT and bone scan showed likely metastatic lesion, PSA elevated further to 3 6  Patient was evaluated by urologist Dr Cyndi Bobby, plan to start Naseem Maki, patient was sent to Medical Oncology for comanagement  Patient came in today with his wife, he reported having intermittent back pain however he has a chronic sciatica going on for years, he did not feel any new symptoms  He otherwise remains in his baseline health status, he has no new headaches vision changes, chest pain, cough, hemoptysis, abdominal pain, nausea vomiting, change of urination or bowel movement habits, no bleeding, no lower extremity swelling, his weight and mobility remains the same  He is able to exercise on a daily basis, he has no fever chills, no lumps bumps  He has no other cancers except prostate cancer in the past, is nonsmoker, drinks alcohol socially  Family history of multiple myeloma, no prostate cancer history  He retired , he relocated from [place] to a [place], he has a coming vacation in [place] for about 3 months  Review of Systems    Constitutional: No fever or chills, feels well, no tiredness, no recent weight gain or weight loss  Eyes: No complaints of eye pain, no red eyes, no discharge from eyes, no itchy eyes  ENT: no complaints of earache, no hearing loss, no nosebleeds, no nasal discharge, no sore throat, no hoarseness  Cardiovascular: No complaints of slow heart rate, no fast heart rate, no chest pain, no palpitations, no leg claudication, no lower extremity  Respiratory: No complaints of shortness of breath, no wheezing, no cough, no SOB on exertion, no orthopnea or PND  Gastrointestinal: No complaints of abdominal pain, no constipation, no nausea or vomiting, no diarrhea or bloody stools  Genitourinary: No complaints of dysuria, no incontinence, no hesitancy, no nocturia, no genital lesion, no testicular pain  Musculoskeletal: No complaints of arthralgia, no myalgias, no joint swelling or stiffness, no limb pain or swelling  Integumentary: No complaints of skin rash or skin lesions, no itching, no skin wound, no dry skin  Neurological: No compliants of headache, no confusion, no convulsions, no numbness or tingling, no dizziness or fainting, no limb weakness, no difficulty walking  Psychiatric: Is not suicidal, no sleep disturbances, no anxiety or depression, no change in personality, no emotional problems  Endocrine: No complaints of proptosis, no hot flashes, no muscle weakness, no erectile dysfunction, no deepening of the voice, no feelings of weakness  Hematologic/Lymphatic: No complaints of swollen glands, no swollen glands in the neck, does not bleed easily, no easy bruising  Active Problems    1  Abnormal fasting glucose (790 29) (R73 01)   2  Annual physical exam (V70 0) (Z00 00)   3  Benign essential hypertension (401 1) (I10)   4  Colon cancer screening (V76 51) (Z12 11)   5  Diabetes mellitus out of control (250 02) (E11 65)   6  Encounter for prostate cancer screening (V76 44) (Z12 5)   7  Glaucoma (365 9) (H40 9)   8  Neoplasm of prostate, 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   (185,198 5) (C61,C79 51)   9  Obesity (BMI 30 0-34 9) (278 00) (E66 9)   10  Prostate cancer (185) (C61)   11  Rising PSA level (790 93) (R97 20)   12  Sprain of right wrist, initial encounter (842 00) (S63 501A)   13  Swelling of joint, wrist, right (719 03) (M25 431)    Past Medical History    · History of malignant neoplasm of prostate (V10 46) (Z85 46)    The active problems and past medical history were reviewed and updated today  Surgical History    · History of Colonoscopy (Fiberoptic)    The surgical history was reviewed and updated today  Family History    · Family history of diabetes mellitus (V18 0) (Z83 3)   · Family history of hypertension (V17 49) (Z82 49)    · Family history of cardiac disorder (V17 49) (Z82 49)   · Family history of diabetes mellitus (V18 0) (Z83 3)   · Family history of hypertension (V17 49) (Z82 49)   · Family history of malignant neoplasm of prostate (V16 42) (Z80 42)    · Family history of diabetes mellitus (V18 0) (Z83 3)   · Family history of hypertension (V17 49) (Z82 49)    · Family history of multiple myeloma (V16 7) (Z80 7)    The family history was reviewed and updated today  Social History    · Alcohol use (V49 89) (Z78 9)   · 2 bear per wk   ·    · Never a smoker   · No illicit drug use   · Occupation   · , retired now   · Retired   ·  from spouse (V61 03) (Z63 5)   · Two children  The social history was reviewed and updated today  Current Meds   1  AmLODIPine Besylate 10 MG Oral Tablet; Take 1 tablet daily; Therapy: 84Evl5784 to (Evaluate:18Nov2017)  Requested for: 33Sjw4338; Last   Rx:74Fyg2642 Ordered   2  CoQ10 100 MG Oral Capsule; Therapy: (Recorded:56Gwv6511) to Recorded   3  MegaRed Omega-3 Krill Oil CAPS; Therapy: (Recorded:54Axq3655) to Recorded   4  MetFORMIN HCl - 850 MG Oral Tablet; Take 1 tablet twice daily; Therapy: 20Mcy3400 to (Evaluate:18Mar2018)  Requested for: 74Pxk4201; Last   Rx:52Efi3731 Ordered   5  Saw Palmetto (Serenoa repens) 450 MG CAPS;    Therapy: (Recorded:07Zaq3493) to Recorded    Allergies    1  No Known Drug Allergies    Vitals   Recorded: 17OJP5614 09:41AM   Temperature 97 2 F   Heart Rate 72   Respiration 16   Systolic 158   Diastolic 80   Height 5 ft 10 in   Weight 219 lb    BMI Calculated 31 42   BSA Calculated 2 17   O2 Saturation 98   Pain Scale 2     Physical Exam    Constitutional   General appearance: No acute distress, well appearing and well nourished  Eyes   Conjunctiva and lids: No swelling, erythema, or discharge  Ears, Nose, Mouth, and Throat   External inspection of ears and nose: Normal     Pulmonary   Respiratory effort: No increased work of breathing or signs of respiratory distress  Auscultation of lungs: Clear to auscultation, equal breath sounds bilaterally, no wheezes, no rales, no rhonci  Cardiovascular   Palpation of heart: Normal PMI, no thrills  Auscultation of heart: Normal rate and rhythm, normal S1 and S2, without murmurs  Examination of extremities for edema and/or varicosities: Normal     Carotid pulses: Normal     Abdomen   Abdomen: Non-tender, no masses  Liver and spleen: No hepatomegaly or splenomegaly  Lymphatic   Palpation of lymph nodes in neck: No lymphadenopathy  Musculoskeletal   Gait and station: Normal     Neurologic   Cranial nerves: Cranial nerves 2-12 intact  Reflexes: 2+ and symmetric  Sensation: No sensory loss  Psychiatric   Orientation to person, place and time: Normal     Mood and affect: Normal         ECOG 1       Assessment    1  Prostate cancer (185) (C61)   · cancer free since , had radiation and chemical castration in [place]   2  Rising PSA level (790 93) (R97 20)    Plan  Rising PSA level    · (1) CBC/PLT/DIFF; Status:Active; Requested for:44Tzu1692; Perform:[facility]; [id number]; Last Updated By:Kathleen Díaz; 11/15/2017 10:19:29 AM;Ordered; For:Rising PSA level; Ordered By:Dory Gallegos;   · (1) CBC/PLT/DIFF; Status:Active;  Requested for:62Nts9964; Perform:[facility]; TPW:87AEA0553; Last Updated By:Gracie Díaz; 11/15/2017 10:19:36 AM;Ordered; For:Rising PSA level; Ordered By:Joaquina Gallegos;   · (1) CBC/PLT/DIFF; Status:Active; Requested for:52Nep8320;    Perform:[facility]; BKB:54SCR1408; Ordered; For:Rising PSA level; Ordered By:Joaquina Gallegos;   · (1) CBC/PLT/DIFF; Status:Complete; Requested for:Recurring Schedule: 11/15/2017;  2/15/2018; 5/15/2018 ; Perform:[facility]; KGH:99SZM5557; Ordered; For:Rising PSA level; Ordered By:Joaquina Gallegos;   · (1) COMPREHENSIVE METABOLIC PANEL; Status:Active; Requested for:35Hpk4607; Perform:[facility]; TRISTAN:59NBE7765; Last Updated By:Gracie Díaz; 11/15/2017 10:19:47 AM;Ordered; For:Rising PSA level; Ordered By:Joaquina Gallegos;   · (1) COMPREHENSIVE METABOLIC PANEL; Status:Active; Requested for:99Tkv4708; Perform:[facility]; ISX:[id number]; Last Updated By:Gracie Díaz; 11/15/2017 10:19:54 AM;Ordered; For:Rising PSA level; Ordered By:Joaquina Gallegos;   · (1) COMPREHENSIVE METABOLIC PANEL; Status:Active; Requested for:10Cuw7899;    Perform:[facility]; RJJ:44XGZ0111; Ordered; For:Rising PSA level; Ordered By:Joaquina Gallegos;   · (1) COMPREHENSIVE METABOLIC PANEL; Status:Complete; Requested for:Recurring  Schedule: 11/15/2017; 2/15/2018; 5/15/2018 ; Perform:[facility]; LMH:84JNI3602; Ordered; For:Rising PSA level; Ordered By:Joaquina Gallegos;   · Follow-up visit in 3 months Evaluation and Treatment  lab today  Follow-up in 3 months  Status: Complete  Done: 18RTQ7808 09:40AM   Ordered; For: Rising PSA level; Ordered By: Dylan Lucas Performed:  Due: 93TUW1837; Last Updated By: Kristina Wilson; 11/15/2017 10:23:23 AM    Discussion/Summary    1, bone lesion, likely hormone sensitive metastatic prostate cancer: TxNxM1, with bone metastatic disease is on right pelvic bone; PSA doubled from 1 8-3 6 within 9 months; evaluated by Urology, plan to start [facility]    --given the bone lesion is on weight bearing area, recommended patient to receive Zometa to prevent pathological fracture  Side-effect of Zometa include causing hypocalcemia, jaw bone necrosis  Patient will see his dentist for clearance before Zometa  Planning to give Zometa monthly for 1 year then every 3 months   --expecting PSA to decrease after Firmagon started, if not, we will consider pelvic MRI or biopsy to confirm metastatic lesion  --start calcium vitamin-D has patient is now on Firmagon  --no indication for chemo based on the Charrted trial as there is no visceral organ involvement and bone lesion is not diffuse ( < 4)  --check CBC, CMP, PSA today  --follow-up in 3 months  The patient, patient's family was counseled regarding diagnostic results  total time of encounter was 40 minutes and 20 minutes was spent counseling        Signatures   Electronically signed by : Monika Ojeda MD; Nov 15 2017 10:58AM EST                       (Author)

## 2018-01-22 ENCOUNTER — GENERIC CONVERSION - ENCOUNTER (OUTPATIENT)
Dept: OTHER | Facility: OTHER | Age: 68
End: 2018-01-22

## 2018-01-22 VITALS
WEIGHT: 221 LBS | HEART RATE: 86 BPM | BODY MASS INDEX: 31.64 KG/M2 | HEIGHT: 70 IN | DIASTOLIC BLOOD PRESSURE: 78 MMHG | SYSTOLIC BLOOD PRESSURE: 144 MMHG | RESPIRATION RATE: 16 BRPM

## 2018-01-22 VITALS
DIASTOLIC BLOOD PRESSURE: 82 MMHG | BODY MASS INDEX: 31.41 KG/M2 | HEART RATE: 82 BPM | HEIGHT: 70 IN | WEIGHT: 219.38 LBS | SYSTOLIC BLOOD PRESSURE: 126 MMHG

## 2018-01-22 DIAGNOSIS — C61 MALIGNANT NEOPLASM OF PROSTATE (HCC): ICD-10-CM

## 2018-01-24 NOTE — MISCELLANEOUS
Message   Recorded as Task   Date: 01/22/2018 12:56 PM, Created By: Amelie Bay   Task Name: Call Back   Assigned To: Amelie Bay   Regarding Patient: Trino Germain, Status: Active   CommentSheryll Yung - 22 Jan 2018 12:56 PM     TASK CREATED  Patient called  Patient just made an appointment to see Dr Kathy Ortega on 2/21  Patient wanted to know if it was ok for him to get another PSA done before that appointment  Patient aware that Dr Kathy Ortega said that would be ok  Patient aware that order was placed  Active Problems    1  Abnormal fasting glucose (790 29) (R73 01)   2  Annual physical exam (V70 0) (Z00 00)   3  Benign essential hypertension (401 1) (I10)   4  Colon cancer screening (V76 51) (Z12 11)   5  Diabetes mellitus out of control (250 02) (E11 65)   6  Encounter for prostate cancer screening (V76 44) (Z12 5)   7  Glaucoma (365 9) (H40 9)   8  Neoplasm of prostate, 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   (185,198 5) (C61,C79 51)   9  Obesity (BMI 30 0-34 9) (278 00) (E66 9)   10  Prostate cancer (185) (C61)   11  Rising PSA level (790 93) (R97 20)   12  Sprain of right wrist, initial encounter (842 00) (S63 501A)   13  Swelling of joint, wrist, right (719 03) (M25 431)    Current Meds   1  AmLODIPine Besylate 10 MG Oral Tablet; Take 1 tablet daily; Therapy: 52Dpu3909 to (Evaluate:18Nov2017)  Requested for: 35Lya9214; Last   Rx:78Gda2315 Ordered   2  CoQ10 100 MG Oral Capsule; Therapy: (Recorded:82Rhs7466) to Recorded   3  MegaRed Omega-3 Krill Oil CAPS; Therapy: (Recorded:53Fxb4090) to Recorded   4  MetFORMIN HCl - 850 MG Oral Tablet; Take 1 tablet twice daily; Therapy: 29Bxi8263 to (Evaluate:18Mar2018)  Requested for: 59Wwr5673; Last   Rx:50Isn1749 Ordered   5  Saw Palmetto (Serenoa repens) 450 MG CAPS; Therapy: (Recorded:45Fpx4050) to Recorded    Allergies    1   No Known Drug Allergies    Plan  Prostate cancer    · (1) PSA, DIAGNOSTIC (FOLLOW-UP); Status:Active - Retrospective By Protocol  Authorization;  Requested Luke Mathews;     Signatures   Electronically signed by : Brigid Arevalo, ; Jan 22 2018 12:56PM EST                       (Author)

## 2018-02-06 ENCOUNTER — DOCUMENTATION (OUTPATIENT)
Dept: OTHER | Facility: HOSPITAL | Age: 68
End: 2018-02-06

## 2018-02-06 ENCOUNTER — APPOINTMENT (OUTPATIENT)
Dept: LAB | Facility: AMBULARY SURGERY CENTER | Age: 68
End: 2018-02-06
Payer: COMMERCIAL

## 2018-02-06 DIAGNOSIS — R97.20 ELEVATED PROSTATE SPECIFIC ANTIGEN (PSA): ICD-10-CM

## 2018-02-06 DIAGNOSIS — C61 MALIGNANT NEOPLASM OF PROSTATE (HCC): ICD-10-CM

## 2018-02-06 LAB
ALBUMIN SERPL BCP-MCNC: 3.9 G/DL (ref 3.5–5)
ALP SERPL-CCNC: 103 U/L (ref 46–116)
ALT SERPL W P-5'-P-CCNC: 24 U/L (ref 12–78)
ANION GAP SERPL CALCULATED.3IONS-SCNC: 5 MMOL/L (ref 4–13)
AST SERPL W P-5'-P-CCNC: 21 U/L (ref 5–45)
BASOPHILS # BLD AUTO: 0.02 THOUSANDS/ΜL (ref 0–0.1)
BASOPHILS NFR BLD AUTO: 0 % (ref 0–1)
BILIRUB SERPL-MCNC: 0.48 MG/DL (ref 0.2–1)
BUN SERPL-MCNC: 18 MG/DL (ref 5–25)
CALCIUM SERPL-MCNC: 9.2 MG/DL (ref 8.3–10.1)
CHLORIDE SERPL-SCNC: 104 MMOL/L (ref 100–108)
CO2 SERPL-SCNC: 30 MMOL/L (ref 21–32)
CREAT SERPL-MCNC: 0.87 MG/DL (ref 0.6–1.3)
EOSINOPHIL # BLD AUTO: 0.13 THOUSAND/ΜL (ref 0–0.61)
EOSINOPHIL NFR BLD AUTO: 2 % (ref 0–6)
ERYTHROCYTE [DISTWIDTH] IN BLOOD BY AUTOMATED COUNT: 15.4 % (ref 11.6–15.1)
GFR SERPL CREATININE-BSD FRML MDRD: 103 ML/MIN/1.73SQ M
GLUCOSE SERPL-MCNC: 109 MG/DL (ref 65–140)
HCT VFR BLD AUTO: 42.9 % (ref 36.5–49.3)
HGB BLD-MCNC: 14.1 G/DL (ref 12–17)
LYMPHOCYTES # BLD AUTO: 1.98 THOUSANDS/ΜL (ref 0.6–4.47)
LYMPHOCYTES NFR BLD AUTO: 36 % (ref 14–44)
MCH RBC QN AUTO: 25 PG (ref 26.8–34.3)
MCHC RBC AUTO-ENTMCNC: 32.9 G/DL (ref 31.4–37.4)
MCV RBC AUTO: 76 FL (ref 82–98)
MONOCYTES # BLD AUTO: 0.53 THOUSAND/ΜL (ref 0.17–1.22)
MONOCYTES NFR BLD AUTO: 10 % (ref 4–12)
NEUTROPHILS # BLD AUTO: 2.9 THOUSANDS/ΜL (ref 1.85–7.62)
NEUTS SEG NFR BLD AUTO: 52 % (ref 43–75)
NRBC BLD AUTO-RTO: 0 /100 WBCS
PLATELET # BLD AUTO: 215 THOUSANDS/UL (ref 149–390)
PMV BLD AUTO: 11 FL (ref 8.9–12.7)
POTASSIUM SERPL-SCNC: 4 MMOL/L (ref 3.5–5.3)
PROT SERPL-MCNC: 7.9 G/DL (ref 6.4–8.2)
PSA SERPL-MCNC: 4.3 NG/ML (ref 0–4)
RBC # BLD AUTO: 5.64 MILLION/UL (ref 3.88–5.62)
SODIUM SERPL-SCNC: 139 MMOL/L (ref 136–145)
WBC # BLD AUTO: 5.57 THOUSAND/UL (ref 4.31–10.16)

## 2018-02-06 PROCEDURE — 36415 COLL VENOUS BLD VENIPUNCTURE: CPT

## 2018-02-06 PROCEDURE — 85025 COMPLETE CBC W/AUTO DIFF WBC: CPT

## 2018-02-06 PROCEDURE — 84153 ASSAY OF PSA TOTAL: CPT

## 2018-02-06 PROCEDURE — 80053 COMPREHEN METABOLIC PANEL: CPT

## 2018-02-06 NOTE — PROGRESS NOTES
I called Delvis Herrera to discuss with him that his psa has increased again to 4 3  We will try to get firmagon approved for him given his biochemical recurrence and increasing psa status post remote radiation therapy for prostate cancer

## 2018-02-15 ENCOUNTER — APPOINTMENT (OUTPATIENT)
Dept: LAB | Facility: AMBULARY SURGERY CENTER | Age: 68
End: 2018-02-15
Payer: COMMERCIAL

## 2018-02-15 DIAGNOSIS — R97.20 ELEVATED PROSTATE SPECIFIC ANTIGEN (PSA): ICD-10-CM

## 2018-02-15 LAB
ALBUMIN SERPL BCP-MCNC: 3.9 G/DL (ref 3.5–5)
ALP SERPL-CCNC: 104 U/L (ref 46–116)
ALT SERPL W P-5'-P-CCNC: 21 U/L (ref 12–78)
ANION GAP SERPL CALCULATED.3IONS-SCNC: 4 MMOL/L (ref 4–13)
AST SERPL W P-5'-P-CCNC: 19 U/L (ref 5–45)
BASOPHILS # BLD AUTO: 0.03 THOUSANDS/ΜL (ref 0–0.1)
BASOPHILS NFR BLD AUTO: 1 % (ref 0–1)
BILIRUB SERPL-MCNC: 0.39 MG/DL (ref 0.2–1)
BUN SERPL-MCNC: 17 MG/DL (ref 5–25)
CALCIUM SERPL-MCNC: 9.1 MG/DL (ref 8.3–10.1)
CHLORIDE SERPL-SCNC: 106 MMOL/L (ref 100–108)
CO2 SERPL-SCNC: 31 MMOL/L (ref 21–32)
CREAT SERPL-MCNC: 0.83 MG/DL (ref 0.6–1.3)
EOSINOPHIL # BLD AUTO: 0.07 THOUSAND/ΜL (ref 0–0.61)
EOSINOPHIL NFR BLD AUTO: 1 % (ref 0–6)
ERYTHROCYTE [DISTWIDTH] IN BLOOD BY AUTOMATED COUNT: 15.4 % (ref 11.6–15.1)
GFR SERPL CREATININE-BSD FRML MDRD: 105 ML/MIN/1.73SQ M
GLUCOSE P FAST SERPL-MCNC: 113 MG/DL (ref 65–99)
HCT VFR BLD AUTO: 42.7 % (ref 36.5–49.3)
HGB BLD-MCNC: 14 G/DL (ref 12–17)
LYMPHOCYTES # BLD AUTO: 1.8 THOUSANDS/ΜL (ref 0.6–4.47)
LYMPHOCYTES NFR BLD AUTO: 34 % (ref 14–44)
MCH RBC QN AUTO: 25 PG (ref 26.8–34.3)
MCHC RBC AUTO-ENTMCNC: 32.8 G/DL (ref 31.4–37.4)
MCV RBC AUTO: 76 FL (ref 82–98)
MONOCYTES # BLD AUTO: 0.54 THOUSAND/ΜL (ref 0.17–1.22)
MONOCYTES NFR BLD AUTO: 10 % (ref 4–12)
NEUTROPHILS # BLD AUTO: 2.85 THOUSANDS/ΜL (ref 1.85–7.62)
NEUTS SEG NFR BLD AUTO: 54 % (ref 43–75)
NRBC BLD AUTO-RTO: 0 /100 WBCS
PLATELET # BLD AUTO: 231 THOUSANDS/UL (ref 149–390)
PMV BLD AUTO: 11.4 FL (ref 8.9–12.7)
POTASSIUM SERPL-SCNC: 4.7 MMOL/L (ref 3.5–5.3)
PROT SERPL-MCNC: 7.8 G/DL (ref 6.4–8.2)
RBC # BLD AUTO: 5.6 MILLION/UL (ref 3.88–5.62)
SODIUM SERPL-SCNC: 141 MMOL/L (ref 136–145)
WBC # BLD AUTO: 5.3 THOUSAND/UL (ref 4.31–10.16)

## 2018-02-15 PROCEDURE — 80053 COMPREHEN METABOLIC PANEL: CPT

## 2018-02-15 PROCEDURE — 85025 COMPLETE CBC W/AUTO DIFF WBC: CPT

## 2018-02-15 PROCEDURE — 36415 COLL VENOUS BLD VENIPUNCTURE: CPT

## 2018-02-19 ENCOUNTER — OFFICE VISIT (OUTPATIENT)
Dept: FAMILY MEDICINE CLINIC | Facility: CLINIC | Age: 68
End: 2018-02-19
Payer: MEDICARE

## 2018-02-19 VITALS
HEIGHT: 70 IN | WEIGHT: 209 LBS | DIASTOLIC BLOOD PRESSURE: 76 MMHG | SYSTOLIC BLOOD PRESSURE: 128 MMHG | TEMPERATURE: 97.9 F | BODY MASS INDEX: 29.92 KG/M2 | RESPIRATION RATE: 18 BRPM | HEART RATE: 74 BPM

## 2018-02-19 DIAGNOSIS — B02.8 HERPES ZOSTER WITH COMPLICATION: Primary | ICD-10-CM

## 2018-02-19 PROBLEM — C61 PROSTATE CANCER (HCC): Status: ACTIVE | Noted: 2017-02-21

## 2018-02-19 PROBLEM — C79.51 NEOPLASM OF PROSTATE, DISTANT METASTASIS STAGING CATEGORY M1B: METASTASIS TO BONE (HCC): Status: ACTIVE | Noted: 2017-11-07

## 2018-02-19 PROBLEM — S63.501A SPRAIN OF RIGHT WRIST: Status: ACTIVE | Noted: 2017-03-30

## 2018-02-19 PROBLEM — H40.9 GLAUCOMA: Status: ACTIVE | Noted: 2017-02-21

## 2018-02-19 PROBLEM — C61 NEOPLASM OF PROSTATE, DISTANT METASTASIS STAGING CATEGORY M1B: METASTASIS TO BONE (HCC): Status: ACTIVE | Noted: 2017-11-07

## 2018-02-19 PROBLEM — R97.20 RISING PSA LEVEL: Status: ACTIVE | Noted: 2017-10-02

## 2018-02-19 PROBLEM — E66.811 OBESITY (BMI 30.0-34.9): Status: ACTIVE | Noted: 2017-06-06

## 2018-02-19 PROBLEM — M25.431 SWELLING OF JOINT, WRIST, RIGHT: Status: ACTIVE | Noted: 2017-02-21

## 2018-02-19 PROBLEM — E66.9 OBESITY (BMI 30.0-34.9): Status: ACTIVE | Noted: 2017-06-06

## 2018-02-19 PROBLEM — I10 BENIGN ESSENTIAL HYPERTENSION: Status: ACTIVE | Noted: 2017-02-21

## 2018-02-19 PROBLEM — IMO0002 DIABETES MELLITUS OUT OF CONTROL: Status: ACTIVE | Noted: 2017-06-06

## 2018-02-19 PROCEDURE — 99213 OFFICE O/P EST LOW 20 MIN: CPT | Performed by: PHYSICIAN ASSISTANT

## 2018-02-19 RX ORDER — LATANOPROST 50 UG/ML
SOLUTION/ DROPS OPHTHALMIC
Refills: 0 | COMMUNITY
Start: 2018-02-05

## 2018-02-19 RX ORDER — VALACYCLOVIR HYDROCHLORIDE 1 G/1
1000 TABLET, FILM COATED ORAL 3 TIMES DAILY
Qty: 21 TABLET | Refills: 0 | Status: SHIPPED | OUTPATIENT
Start: 2018-02-19 | End: 2018-03-13 | Stop reason: ALTCHOICE

## 2018-02-19 RX ORDER — VITAMIN E 200 UNIT
CAPSULE ORAL
COMMUNITY

## 2018-02-19 RX ORDER — AMLODIPINE BESYLATE 10 MG/1
10 TABLET ORAL DAILY
Refills: 0 | COMMUNITY
Start: 2017-11-26 | End: 2018-03-01 | Stop reason: SDUPTHER

## 2018-02-19 RX ORDER — VITAMIN B COMPLEX
TABLET ORAL
COMMUNITY

## 2018-02-19 NOTE — PROGRESS NOTES
Assessment/Plan:     Diagnoses and all orders for this visit:    Herpes zoster with complication  -     valACYclovir (VALTREX) 1,000 mg tablet; Take 1 tablet (1,000 mg total) by mouth 3 (three) times a day for 7 days        - Valtrex for 7 days to minimize spread and improve symptoms   - Recommended Tylenol as needed for pain management  - Recommended not to itch it and continue with hygiene as tolerated  - Discussed possible complications including postherpetic neuralgia  - Recommended shingles vaccine after it entirely clears up  - Recommended to return if rash spreads beyond its current region, fever, chills, unremitting headaches  Return if rash does not resolve after 4 weeks  Subjective:      Patient ID: Dale Hernandes is a 80 y o  male  Dale Hernandes is presenting today for itchy rash on his right forearm, axilla and right upper chest which started 5 days ago  It started with an itch and tingling before erupting on the skin  He denies fever, chills, any recent exposure to irritating agents, new soaps or detergents  He has been applying neosporin and washing with soap as normal  The neosporin helps the itch mildly  Pt had chicken pox a child and has not had a shingles vaccination  Rash   The affected locations include the right axilla and right arm  The rash is characterized by burning, itchiness, blistering, pain and redness  He was exposed to nothing  Pertinent negatives include no cough, diarrhea, fatigue, fever, rhinorrhea, shortness of breath, sore throat or vomiting  The following portions of the patient's history were reviewed and updated as appropriate: allergies, current medications, past medical history, past social history and problem list     Review of Systems   Constitutional: Negative for activity change, fatigue, fever and unexpected weight change  HENT: Negative for rhinorrhea and sore throat  Respiratory: Negative for cough, shortness of breath and wheezing  Cardiovascular: Negative for chest pain, palpitations and leg swelling  Gastrointestinal: Negative for constipation, diarrhea, nausea and vomiting  Musculoskeletal: Negative for back pain, neck pain and neck stiffness  Skin: Positive for rash  Negative for pallor and wound  Neurological: Negative for dizziness, syncope, weakness, numbness and headaches  Objective:  /76   Pulse 74   Temp 97 9 °F (36 6 °C) (Tympanic)   Resp 18   Ht 5' 10" (1 778 m)   Wt 94 8 kg (209 lb)   BMI 29 99 kg/m²      Physical Exam   Constitutional: He appears well-developed and well-nourished  HENT:   Head: Normocephalic and atraumatic  Right Ear: Tympanic membrane normal    Left Ear: Tympanic membrane normal    Nose: No rhinorrhea  Cardiovascular: Normal rate, regular rhythm and normal heart sounds  Exam reveals no gallop and no friction rub  No murmur heard  Pulmonary/Chest: Effort normal and breath sounds normal  He has no wheezes  He has no rales  Lymphadenopathy:        Head (right side): No submental, no submandibular, no tonsillar, no preauricular and no posterior auricular adenopathy present  Head (left side): No submental, no submandibular, no tonsillar, no preauricular and no posterior auricular adenopathy present  He has no cervical adenopathy  Skin: Rash noted  Rash is vesicular

## 2018-02-24 NOTE — PROGRESS NOTES
Problem List Items Addressed This Visit     Rising PSA level    Neoplasm of prostate, 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 ([facility] [address] ) - Primary              Assessment and plan:       1  Prostate cancer, recurrent after radiation therapy:  Jose L Norman is doing well today  He did have some troubles with insurance coverage but has been approved for Lupron therapy  He is currently undergoing a she goals outbreak and does not wish to have his Lupron injection today, which is a good decision  While he initially wanted [place], today, I did tell him that the mechanism of action while different produces the same and result of castrate testosterone levels  I have sent him for a DEXA scan to look at his baseline bone density while starting androgen deprivation therapy  I have sent him for Casodex therapy as an androgen receptor blockade during Lupron administration to prevent flare  I have recommended that he continue his weightlifting exercise regimen as this will maximize bone health and he is taking calcium and vitamin-D  We will set him up for Lupron injection within 2 weeks while taking an androgen receptor blocker and he will continue to follow with Dr Dax Delgadillo  After his Lupron injection he will follow up with me in approximately 6 months  Mitul Khoury MD      Chief Complaint     No chief complaint on file  History of Present Illness     Nathan Hernandez is a 80 y o  man with a past medical history as listed below with rising PSA after a history of radiation therapy for prostate cancer  His most recent PSA has gone up again and is 4 3 from 02/06/2018  His functional status is good he denies particular bone pain or other decrease in his activities of daily living since the last visit      He denies dysuria, incontinence, hesitancy of urination, gross hematuria, he has occasional urinary urgency, he awakens twice at night to void, he feels empty after voiding, and stream quality is good  He is following with Dr Deion Kern of Hematology Oncology and he does not wish to undergo any further treatment at this time apart from androgen deprivation therapy (he does not want chemotherapy at this time)  Detailed Urologic History     - please refer to HPI    Review of Systems     Review of Systems   Constitutional: Negative  HENT: Negative  Eyes: Negative  Respiratory: Negative  Cardiovascular: Negative  Gastrointestinal: Negative  Endocrine: Negative  Genitourinary: Negative  Nocturia x2   Musculoskeletal: Negative  Skin: Negative  Allergic/Immunologic: Negative  Neurological: Negative  Hematological: Negative  Psychiatric/Behavioral: Negative  Allergies     No Known Allergies    Physical Exam     Physical Exam   Constitutional: He is oriented to person, place, and time  He appears well-developed and well-nourished  No distress  HENT:   Head: Normocephalic and atraumatic  Eyes: EOM are normal  Pupils are equal, round, and reactive to light  Right eye exhibits no discharge  Left eye exhibits no discharge  Neck: Normal range of motion  Neck supple  No tracheal deviation present  No thyromegaly present  Cardiovascular: Normal rate, regular rhythm and intact distal pulses  Pulmonary/Chest: Effort normal  No stridor  No respiratory distress  He has no wheezes  He exhibits tenderness (Patient has chest tenderness around the area of his shingles outbreak, has weeping vesicles and is on valacyclovir for this)  Abdominal: Soft  Bowel sounds are normal  He exhibits no distension and no mass  There is no tenderness  There is no rebound and no guarding  No hernia  Musculoskeletal: Normal range of motion  He exhibits no edema, tenderness or deformity  Lymphadenopathy:     He has no cervical adenopathy  Neurological: He is alert and oriented to person, place, and time  No cranial nerve deficit or sensory deficit   He exhibits normal muscle tone  Coordination normal    Skin: Skin is warm and dry  No rash noted  He is not diaphoretic  No erythema  No pallor  Psychiatric: He has a normal mood and affect  His behavior is normal  Judgment and thought content normal    Nursing note and vitals reviewed  Vital Signs  There were no vitals filed for this visit  Current Medications       Current Outpatient Prescriptions:     amLODIPine (NORVASC) 10 mg tablet, Take 10 mg by mouth daily, Disp: , Rfl: 0    Coenzyme Q10 (COQ10) 100 MG CAPS, Take by mouth, Disp: , Rfl:     latanoprost (XALATAN) 0 005 % ophthalmic solution, INSTILL 1 DROP INTO BOTH EYES AT BEDTIME  FILL ON 01-09-18, Disp: , Rfl: 0    MEGARED OMEGA-3 KRILL  MG CAPS, Take by mouth, Disp: , Rfl:     metFORMIN (GLUCOPHAGE) 850 mg tablet, Take 1 tablet by mouth 2 (two) times a day, Disp: , Rfl:     Saw Palmetto, Serenoa repens, 450 MG CAPS, Take by mouth, Disp: , Rfl:     valACYclovir (VALTREX) 1,000 mg tablet, Take 1 tablet (1,000 mg total) by mouth 3 (three) times a day for 7 days, Disp: 21 tablet, Rfl: 0      Active Problems     Patient Active Problem List   Diagnosis    Sprain of right wrist    Swelling of joint, wrist, right    Rising PSA level    Prostate cancer ([facility] [address] )    Obesity (BMI 30 0-34  9)    Neoplasm of prostate, 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 ([facility] [address] )    Glaucoma    Diabetes mellitus out of control ([facility] [address] )    Benign essential hypertension         Past Medical History     Past Medical History:   Diagnosis Date    Malignant neoplasm of prostate [facility])          Surgical History     Past Surgical History:   Procedure Laterality Date    COLONOSCOPY      9 years ago         Family History     Family History   Problem Relation Age of Onset    Diabetes Mother     Hypertension Mother     Heart disease Father      cardiac disorder    Diabetes Father     Hypertension Father     Prostate cancer Father     Diabetes Sister  Hypertension Sister     Multiple myeloma Brother          Social History     Social History     History   Smoking Status    Never Smoker   Smokeless Tobacco    Never Used         Pertinent Lab Values     Lab Results   Component Value Date    CREATININE 0 83 02/15/2018       Lab Results   Component Value Date    PSA 4 3 (H) 02/06/2018    PSA 3 6 10/02/2017    PSA 2 8 05/15/2017             Pertinent Imaging     Bone scan and CT scan abdomen pelvis from October 2017 have been previously be reviewed by me and show increased radiotracer activity in the right hemipelvis  There is no evidence of soft tissue metastasis

## 2018-02-24 NOTE — PATIENT INSTRUCTIONS
Prostate Cancer   WHAT YOU NEED TO KNOW:   What do I need to know about prostate cancer? The prostate is the male sex gland that helps make semen  It is about the size of a walnut and wraps around the urethra  The urethra is the tube that carries urine from the bladder to the end of the penis  In most cases, prostate cancer is slow growing  What increases my risk for prostate cancer? · Age older than 48 years    · Father or brother with prostate cancer    · Regularly eating fried or high-fat foods    · Eating few fruits and vegetables    · Exposure to high amounts of certain chemicals, such as in cigarette smoke or alkaline batteries    · A sexually transmitted infection (STI)  What are the signs and symptoms of prostate cancer? You may have no symptoms during the early stages  In the later stages, you may have any of the following:  · Trouble starting or stopping the flow of urine    · Feeling the need to urinate often, especially at night    · Pain or a burning feeling when you urinate or ejaculate semen    · Trouble having an erection    · Blood in your urine or semen    · Not being able to urinate at all    · Pain or stiffness in your lower back, hips, or upper thighs  How is prostate cancer diagnosed? · Digital rectal examination (ZOILA)  is a test to check the size and shape of your prostate  Your healthcare provider will insert a gloved finger into your rectum to feel if your prostate is large, firm, or has lumps  · Prostate-specific antigen (PSA)  is a blood test to check PSA levels  These levels may be increased if you have prostate cancer  · A biopsy  is used to take a sample of your prostate gland to be tested for cancer  The sample may also help healthcare providers determine the stage of your cancer  How is prostate cancer treated? If you have early stage cancer, your healthcare provider may recommend that you have frequent tests and regular follow-up visits to watch for changes   You may also need any of the following:  · Hormone therapy  is medicine used to decrease testosterone (male hormone) levels  · Radiation therapy  is used to kill cancer cells with high-energy x-ray beams  You may receive radiation therapy from outside your body or from small beads or rods placed inside your prostate  · Surgery  may be needed, depending on the stage of the cancer  Part or all of your prostate may be removed  You may also need to have some lymph nodes taken out  This may help keep the cancer from spreading to other parts of your body  What can I do to manage my prostate cancer? · Do not smoke  Nicotine can damage blood vessels and make it more difficult to manage your prostate cancer  Smoking also increases your risk for new or returning cancer and delays healing after treatment  Do not use e-cigarettes or smokeless tobacco in place of cigarettes or to help you quit  They still contain nicotine  Ask your healthcare provider for information if you currently smoke and need help quitting  · Limit or do not drink alcohol as directed  Limit alcohol to 2 drinks per day  A drink is 12 ounces of beer, 1½ ounces of liquor, or 5 ounces of wine  · Eat a variety of healthy foods  Healthy foods include fruits, vegetables, whole-grain breads, low-fat dairy products, beans, lean meats, and fish  Your healthcare provider may also recommend changes to the amounts of calcium and vitamin D you have each day  · Manage your weight  Obesity may increase your risk for problems from prostate cancer  Limit or do not have high-calorie foods or drinks  · Exercise as directed  Exercise may help you recover after treatment and may help prevent your prostate cancer from returning  Exercise can also help you manage your weight  Try to get at least 30 minutes of exercise 5 days a week, such as walking  · Ask about sexual activity    Ask your healthcare provider when it is safe for you to start having sex after your treatment  Medicines may be given if you have trouble getting or maintaining an erection  · Manage incontinence  You may have incontinence (trouble controlling when you urinate) after treatment  Ask your healthcare provider for information on managing urinary incontinence  You may be able to gain control over your urination with techniques or medicines  · Drink liquids as directed  Ask how much liquid to drink each day and which liquids are best for you  Drink extra liquids to prevent dehydration  You will also need to replace fluid if you are vomiting or have diarrhea from cancer treatments  Call 911 for any of the following:   · Your leg feels warm, tender, and painful  It may look swollen and red  · You suddenly feel lightheaded and short of breath  · You have chest pain when you take a deep breath or cough  · You cough up blood  When should I contact my healthcare provider? · You have a fever  · You feel you cannot cope with your illness  · You have blood in your urine or have trouble urinating  · You have pain that does not get better after you take your medicine  · You have questions or concerns about your condition or care  CARE AGREEMENT:   You have the right to help plan your care  Learn about your health condition and how it may be treated  Discuss treatment options with your caregivers to decide what care you want to receive  You always have the right to refuse treatment  The above information is an  only  It is not intended as medical advice for individual conditions or treatments  Talk to your doctor, nurse or pharmacist before following any medical regimen to see if it is safe and effective for you  © 2017 [address] [employer] Information is for End User's use only and may not be sold, redistributed or otherwise used for commercial purposes   All illustrations and images included in CareNotes® are the copyrighted property of [employer]  or [employer]  Bone Metastasis   WHAT YOU NEED TO KNOW:   What is bone metastasis? Bone metastasis is cancer that starts in one area and then spreads to a bone  Some examples are lung, breast, thyroid, prostate, and kidney cancers  Bone metastasis often happens in the spine, upper arm or leg bone, ribs, hips, or skull  Your risk for bone metastasis is higher if you have had cancer for a long time  Cancer that spreads to a bone can weaken the bone and increase your risk for fractures  What are the signs and symptoms of bone metastasis? · Bone pain that is worse at night    · Discomfort when you walk or sit, or trouble finding a comfortable position    · Fatigue or weakness    · Bone fractures    · Numb area, trouble moving a body part, or trouble urinating    · Weight loss without trying, or loss of appetite    · High levels of calcium in your blood     · Bleeding or bruising easily    · Back pain  How is bone metastasis diagnosed? Tell your healthcare provider if you feel pain in one area, or in many areas  Tell him if the pain is constant or comes and goes  Describe the pain  For example, it may be dull, achy, or sharp  You may have shooting pain that starts in your back and goes down your leg  Tell your healthcare provider if anything you do makes the pain worse or helps relieve it  You may need any of the following:  · An x-ray, CT, or MRI  may be used to check your bones for tumors  The pictures may also show missing bone or extra bone  Cancer can prevent new bone from forming, or create extra bone  You may be given contrast liquid to help your bones show up better in the pictures  Tell the healthcare provider if you have ever had an allergic reaction to contrast liquid  Do not enter the MRI room with anything metal  Metal can cause serious damage  Tell the healthcare provider if you have any metal in or on your body      · A bone scan , or PET scan, may be used to check your bones  You will be given a liquid called a tracer  The tracer helps problems in the bones show up better in the scan  You may need more than one bone scan to check the tumor's growth over time  · Blood tests  may be used to check for chemicals called tumor markers that are released by the tumor  The tests may also show if you have large amounts of calcium in your blood  Bone cancer can cause the bone to dissolve and release stored calcium  · A biopsy  is a procedure used to take a sample of the tumor to be checked for cancer  How is bone metastasis treated? Treatment will depend on the type of cancer that spread to a bone  The cancer cells will still look and act like the original cancer  It will respond to treatment for that type of cancer  For example, if you had breast cancer that spread to a bone, you will need treatment used for breast cancer  Treatment may shrink the tumor or slow its growth but may not make the cancer go away completely  · Prescription pain medicine  may be given  Ask how to take this medicine safely  · NSAIDs , such as ibuprofen, help decrease swelling, pain, and fever  This medicine is available with or without a doctor's order  NSAIDs can cause stomach bleeding or kidney problems in certain people  If you take blood thinner medicine, always ask your healthcare provider if NSAIDs are safe for you  Always read the medicine label and follow directions  · Medicine  may be given to reduce bone pain, slow damage caused by the cancer, or prevent the cancer from causing the bone to fracture  · Chemotherapy  (chemo) is medicine used to kill cancer cells  · Radiation  uses high-energy rays to kill cancer cells or keep them from growing quickly  · Targeted therapy  is medicine used to kill cancer cells by destroying what is inside the cells  You may have targeted therapy along with chemo or radiation  · Surgery  may be needed to prevent fractures   Screws, plates, or other devices may be used to help make the bone more stable  Surgery may also be used to help a fractured bone heal correctly  Bone cement may be used along with other treatments to strengthen the bone  What can I do to manage my bone metastasis? · Prevent falls  Wear shoes that fit well and have soles that   Wear shoes both inside and outside  Remove objects from walkways and stairs so you do not trip on them  Place cords for telephones and lamps out of the way so that you do not need to walk over them  Remove small rugs or secure them with double-sided tape  Install bright lights in your home  Use night lights to help light paths to the bathroom or kitchen  · Do not smoke  Smoking increases your risk for new or returning cancer, and can cause bone loss  Smoking can also delay healing after treatment  Ask your healthcare provider for information if you currently smoke and need help quitting  · Limit or do not drink alcohol as directed  Alcohol can decrease bone mineral density and weaken your bones  Limit alcohol to 2 drinks per day if you are a man  Limit alcohol to 1 drink per day if you are a woman  A drink of alcohol is 12 ounces of beer, 5 ounces of wine, or 1½ ounces of liquor  · Eat a variety of healthy foods  Healthy foods include fruits, vegetables, lean meats, beans, and low-fat dairy products  Your healthcare provider may recommend that you get more calcium and vitamin D  Calcium and vitamin D work together to build and protect bones  Good sources of calcium are dairy products, broccoli, tofu, almonds, and canned sardines  Vitamin D is in fish oils, some vegetables, and fortified milk, cereal, and bread  Vitamin D is also formed in the skin when it is exposed to the sun  Ask your healthcare provider how much sunlight is safe for you  · Go to physical or occupational therapy as directed  A physical therapist can teach you safe exercises to strengthen your bones and muscles   Strong muscles can help protect bones  An occupational therapist can teach you how to do your daily activities safely  · Keep a pain diary  Include where you feel the pain and if anything helped relieve it  Bring your pain diary to follow-up visits with your healthcare providers  When should I seek immediate care? · You break a bone  · You have sudden sharp pain, or the pain spreads to other areas of your body  · You have new or worsening pain that does not get better with medicine  · You cannot move part of your body, or it is numb  · You have loss of appetite, nausea, or severe thirst, or you are vomiting  · You are urinating more than usual, or you feel tired, weak, confused, or sleepy  · You have back or neck pain, constipation, and trouble urinating  When should I contact my healthcare provider? · You have muscle weakness, or you are unusually tired  · You have new pain that seems to be coming from a bone  · You have questions or concerns about your condition or care  CARE AGREEMENT:   You have the right to help plan your care  Learn about your health condition and how it may be treated  Discuss treatment options with your caregivers to decide what care you want to receive  You always have the right to refuse treatment  The above information is an  only  It is not intended as medical advice for individual conditions or treatments  Talk to your doctor, nurse or pharmacist before following any medical regimen to see if it is safe and effective for you  © 2017 [address] [employer] Information is for End User's use only and may not be sold, redistributed or otherwise used for commercial purposes  All illustrations and images included in CareNotes® are the copyrighted property of [employer]  or [address]

## 2018-02-26 ENCOUNTER — TELEPHONE (OUTPATIENT)
Dept: UROLOGY | Facility: CLINIC | Age: 68
End: 2018-02-26

## 2018-02-26 ENCOUNTER — OFFICE VISIT (OUTPATIENT)
Dept: UROLOGY | Facility: CLINIC | Age: 68
End: 2018-02-26
Payer: MEDICARE

## 2018-02-26 VITALS
HEIGHT: 71 IN | HEART RATE: 88 BPM | DIASTOLIC BLOOD PRESSURE: 80 MMHG | WEIGHT: 208.4 LBS | SYSTOLIC BLOOD PRESSURE: 124 MMHG | BODY MASS INDEX: 29.18 KG/M2

## 2018-02-26 DIAGNOSIS — C61 NEOPLASM OF PROSTATE, DISTANT METASTASIS STAGING CATEGORY M1B: METASTASIS TO BONE (HCC): Primary | ICD-10-CM

## 2018-02-26 DIAGNOSIS — R97.20 RISING PSA LEVEL: ICD-10-CM

## 2018-02-26 DIAGNOSIS — C79.51 NEOPLASM OF PROSTATE, DISTANT METASTASIS STAGING CATEGORY M1B: METASTASIS TO BONE (HCC): Primary | ICD-10-CM

## 2018-02-26 PROCEDURE — 99215 OFFICE O/P EST HI 40 MIN: CPT | Performed by: UROLOGY

## 2018-02-26 RX ORDER — BICALUTAMIDE 50 MG/1
50 TABLET, FILM COATED ORAL DAILY
Qty: 30 TABLET | Refills: 1 | Status: ON HOLD | OUTPATIENT
Start: 2018-02-26 | End: 2018-06-25

## 2018-02-26 NOTE — LETTER
February 26, 2018     Jose Kohler MD  [address]  [place] [address]    Patient: Kavitha Higuera   YOB: 1950   Date of Visit: 2/26/2018       Dear Dr Ryan Offer:    Thank you for referring Kavitha Higuera to me for evaluation  Below are my notes for this consultation  If you have questions, please do not hesitate to call me  I look forward to following your patient along with you  Sincerely,        Franklyn Thomas MD        CC: Compa Vale MD PhD  Franklyn Thomas MD  2/26/2018 10:43 AM  Sign at close encounter       Problem List Items Addressed This Visit     Rising PSA level    Neoplasm of prostate, 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 [facility]) - Primary              Assessment and plan:       1  Prostate cancer, recurrent after radiation therapy:  Sid Collins is doing well today  He did have some troubles with insurance coverage but has been approved for Lupron therapy  He is currently undergoing a she goals outbreak and does not wish to have his Lupron injection today, which is a good decision  While he initially wanted [place], today, I did tell him that the mechanism of action while different produces the same and result of castrate testosterone levels  I have sent him for a DEXA scan to look at his baseline bone density while starting androgen deprivation therapy  I have sent him for Casodex therapy as an androgen receptor blockade during Lupron administration to prevent flare  I have recommended that he continue his weightlifting exercise regimen as this will maximize bone health and he is taking calcium and vitamin-D  We will set him up for Lupron injection within 2 weeks while taking an androgen receptor blocker and he will continue to follow with Dr Scott Rizo  After his Lupron injection he will follow up with me in approximately 6 months  Franklyn Thomas MD      Chief Complaint     No chief complaint on file          History of Present Illness oDminique Benitez is a 80 y o  man with a past medical history as listed below with rising PSA after a history of radiation therapy for prostate cancer  His most recent PSA has gone up again and is 4 3 from 02/06/2018  His functional status is good he denies particular bone pain or other decrease in his activities of daily living since the last visit  He denies dysuria, incontinence, hesitancy of urination, gross hematuria, he has occasional urinary urgency, he awakens twice at night to void, he feels empty after voiding, and stream quality is good  He is following with Dr Ava Ruiz of Hematology Oncology and he does not wish to undergo any further treatment at this time apart from androgen deprivation therapy (he does not want chemotherapy at this time)  Detailed Urologic History     - please refer to HPI    Review of Systems     Review of Systems   Constitutional: Negative  HENT: Negative  Eyes: Negative  Respiratory: Negative  Cardiovascular: Negative  Gastrointestinal: Negative  Endocrine: Negative  Genitourinary: Negative  Nocturia x2   Musculoskeletal: Negative  Skin: Negative  Allergic/Immunologic: Negative  Neurological: Negative  Hematological: Negative  Psychiatric/Behavioral: Negative  Allergies     No Known Allergies    Physical Exam     Physical Exam   Constitutional: He is oriented to person, place, and time  He appears well-developed and well-nourished  No distress  HENT:   Head: Normocephalic and atraumatic  Eyes: EOM are normal  Pupils are equal, round, and reactive to light  Right eye exhibits no discharge  Left eye exhibits no discharge  Neck: Normal range of motion  Neck supple  No tracheal deviation present  No thyromegaly present  Cardiovascular: Normal rate, regular rhythm and intact distal pulses  Pulmonary/Chest: Effort normal  No stridor  No respiratory distress  He has no wheezes   He exhibits tenderness (Patient has chest tenderness around the area of his shingles outbreak, has weeping vesicles and is on valacyclovir for this)  Abdominal: Soft  Bowel sounds are normal  He exhibits no distension and no mass  There is no tenderness  There is no rebound and no guarding  No hernia  Musculoskeletal: Normal range of motion  He exhibits no edema, tenderness or deformity  Lymphadenopathy:     He has no cervical adenopathy  Neurological: He is alert and oriented to person, place, and time  No cranial nerve deficit or sensory deficit  He exhibits normal muscle tone  Coordination normal    Skin: Skin is warm and dry  No rash noted  He is not diaphoretic  No erythema  No pallor  Psychiatric: He has a normal mood and affect  His behavior is normal  Judgment and thought content normal    Nursing note and vitals reviewed  Vital Signs  There were no vitals filed for this visit  Current Medications       Current Outpatient Prescriptions:     amLODIPine (NORVASC) 10 mg tablet, Take 10 mg by mouth daily, Disp: , Rfl: 0    Coenzyme Q10 (COQ10) 100 MG CAPS, Take by mouth, Disp: , Rfl:     latanoprost (XALATAN) 0 005 % ophthalmic solution, INSTILL 1 DROP INTO BOTH EYES AT BEDTIME  FILL ON 01-09-18, Disp: , Rfl: 0    MEGARED OMEGA-3 KRILL  MG CAPS, Take by mouth, Disp: , Rfl:     metFORMIN (GLUCOPHAGE) 850 mg tablet, Take 1 tablet by mouth 2 (two) times a day, Disp: , Rfl:     Saw Palmetto, Serenoa repens, 450 MG CAPS, Take by mouth, Disp: , Rfl:     valACYclovir (VALTREX) 1,000 mg tablet, Take 1 tablet (1,000 mg total) by mouth 3 (three) times a day for 7 days, Disp: 21 tablet, Rfl: 0      Active Problems     Patient Active Problem List   Diagnosis    Sprain of right wrist    Swelling of joint, wrist, right    Rising PSA level    Pro[facility] [address] )    Obesity (BMI 30 0-34  9)    Neoplasm of prostate, 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 ([address] )    Glaucoma    Diabetes mellitus out of control ([facility] [address] )    Benign essential hypertension         Past Medical History     Past Medical History:   Diagnosis Date    Malignant neoplasm of prostate [facility])          Surgical History     Past Surgical History:   Procedure Laterality Date    COLONOSCOPY      9 years ago         Family History     Family History   Problem Relation Age of Onset    Diabetes Mother     Hypertension Mother     Heart disease Father      cardiac disorder    Diabetes Father     Hypertension Father     Prostate cancer Father     Diabetes Sister     Hypertension Sister     Multiple myeloma Brother          Social History     Social History     History   Smoking Status    Never Smoker   Smokeless Tobacco    Never Used         Pertinent Lab Values     Lab Results   Component Value Date    CREATININE 0 83 02/15/2018       Lab Results   Component Value Date    PSA 4 3 (H) 02/06/2018    PSA 3 6 10/02/2017    PSA 2 8 05/15/2017             Pertinent Imaging     Bone scan and CT scan abdomen pelvis from October 2017 have been previously be reviewed by me and show increased radiotracer activity in the right hemipelvis  There is no evidence of soft tissue metastasis

## 2018-02-26 NOTE — TELEPHONE ENCOUNTER
Spoke with patient, scheduled 3/15 at 0830 in the morning at the [facility] location  Pt aware and confirmed appt details

## 2018-02-26 NOTE — TELEPHONE ENCOUNTER
Patient has Medicare as his primary per patient no auth needed office stock ok    Thanks  Giovanni Garcia

## 2018-02-28 ENCOUNTER — TRANSCRIBE ORDERS (OUTPATIENT)
Dept: RADIOLOGY | Facility: CLINIC | Age: 68
End: 2018-02-28

## 2018-03-01 DIAGNOSIS — I10 ESSENTIAL HYPERTENSION: Primary | ICD-10-CM

## 2018-03-01 RX ORDER — AMLODIPINE BESYLATE 10 MG/1
10 TABLET ORAL DAILY
Qty: 30 TABLET | Refills: 0 | Status: SHIPPED | OUTPATIENT
Start: 2018-03-01 | End: 2018-03-27 | Stop reason: SDUPTHER

## 2018-03-13 ENCOUNTER — OFFICE VISIT (OUTPATIENT)
Dept: FAMILY MEDICINE CLINIC | Facility: CLINIC | Age: 68
End: 2018-03-13
Payer: MEDICARE

## 2018-03-13 VITALS
RESPIRATION RATE: 16 BRPM | DIASTOLIC BLOOD PRESSURE: 74 MMHG | OXYGEN SATURATION: 97 % | HEIGHT: 71 IN | WEIGHT: 207.4 LBS | BODY MASS INDEX: 29.03 KG/M2 | SYSTOLIC BLOOD PRESSURE: 132 MMHG | HEART RATE: 68 BPM

## 2018-03-13 DIAGNOSIS — C79.51 NEOPLASM OF PROSTATE, DISTANT METASTASIS STAGING CATEGORY M1B: METASTASIS TO BONE (HCC): ICD-10-CM

## 2018-03-13 DIAGNOSIS — IMO0001 UNCONTROLLED TYPE 2 DIABETES MELLITUS WITHOUT COMPLICATION, WITHOUT LONG-TERM CURRENT USE OF INSULIN: ICD-10-CM

## 2018-03-13 DIAGNOSIS — C61 NEOPLASM OF PROSTATE, DISTANT METASTASIS STAGING CATEGORY M1B: METASTASIS TO BONE (HCC): ICD-10-CM

## 2018-03-13 DIAGNOSIS — C61 PROSTATE CANCER (HCC): ICD-10-CM

## 2018-03-13 DIAGNOSIS — Z12.11 COLON CANCER SCREENING: ICD-10-CM

## 2018-03-13 DIAGNOSIS — I10 BENIGN ESSENTIAL HYPERTENSION: Primary | ICD-10-CM

## 2018-03-13 PROCEDURE — 99214 OFFICE O/P EST MOD 30 MIN: CPT | Performed by: FAMILY MEDICINE

## 2018-03-13 PROCEDURE — 1101F PT FALLS ASSESS-DOCD LE1/YR: CPT | Performed by: FAMILY MEDICINE

## 2018-03-13 NOTE — PROGRESS NOTES
Assessment/Plan:    Problem List Items Addressed This Visit        Endocrine    Diabetes mellitus out of control ([facility] )     Needs to do his labs and continue metformin         Relevant Orders    HEMOGLOBIN A1C W/ EAG ESTIMATION    Lipid panel    TSH, 3rd generation    Microalbumin / creatinine urine ratio       Cardiovascular and Mediastinum    Benign essential hypertension - Primary     Hypertension is stable, continue amlodipine         Relevant Orders    TSH, 3rd generation       Genitourinary    Prostate cancer ([facility] )    Neoplasm of prostate, 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 [facility])    Relevant Orders    Spirometry pre and post bronchodilator       Other    Colon cancer screening    Relevant Orders    Cologuard          Chief Complaint   Patient presents with    Follow-up     blood work results       Subjective:   Patient ID: Aftab Tang is a 80 y o  male  He says he is taking all his medications, he did not go for blood work, he has prostate cancer with mets to the bone and he is getting treatment from urologist and he is getting Lupron every 6 months, and he is going for another bone scan before he will see the urologist,  He has glaucoma and follow-up with ophthalmologist regularly and he denies any retinopathy  For hypertension he takes amlodipine regularly and denies any new symptoms  He does regular exercise and he does not get any chest pain or shortness of breath and he plans to do marathon running, he wants to get his pulmonary function test   He used to smoke when he was teenager  He had colonoscopy 9 years ago but now he wants to have cologaurd ,         Review of Systems   Constitutional: Negative for activity change, appetite change, chills, diaphoresis, fatigue, fever and unexpected weight change     HENT: Negative for congestion, dental problem, ear discharge, ear pain, facial swelling, hearing loss, mouth sores, nosebleeds, postnasal drip, rhinorrhea, sinus pain, sinus pressure, sneezing, sore throat, trouble swallowing and voice change  Eyes: Negative for photophobia, pain, discharge, redness and itching  Respiratory: Negative for cough, chest tightness, shortness of breath and wheezing  Cardiovascular: Negative for chest pain, palpitations and leg swelling  Gastrointestinal: Negative for abdominal distention, abdominal pain, blood in stool, constipation, diarrhea and nausea  Endocrine: Negative for cold intolerance, heat intolerance, polydipsia, polyphagia and polyuria  Genitourinary: Negative for dysuria, flank pain, frequency, hematuria and urgency  Musculoskeletal: Negative for arthralgias, back pain, myalgias and neck pain  Skin: Negative for color change and pallor  Allergic/Immunologic: Negative for environmental allergies and food allergies  Neurological: Negative for dizziness, weakness, light-headedness, numbness and headaches  Hematological: Negative for adenopathy  Does not bruise/bleed easily  Psychiatric/Behavioral: Negative for behavioral problems, sleep disturbance and suicidal ideas  The patient is not nervous/anxious  Objective:  Physical Exam   Constitutional: He is oriented to person, place, and time  He appears well-developed and well-nourished  HENT:   Head: Normocephalic and atraumatic  Right Ear: External ear normal    Left Ear: External ear normal    Nose: Nose normal    Mouth/Throat: Oropharynx is clear and moist  No oropharyngeal exudate  Eyes: Conjunctivae and EOM are normal  Pupils are equal, round, and reactive to light  Right eye exhibits no discharge  Left eye exhibits no discharge  No scleral icterus  Neck: Normal range of motion  Neck supple  No tracheal deviation present  No thyromegaly present  Cardiovascular: Normal rate, regular rhythm and normal heart sounds  No murmur heard  Pulmonary/Chest: Effort normal and breath sounds normal  No respiratory distress  He has no wheezes   He has no rales    Abdominal: Soft  Bowel sounds are normal  He exhibits no distension and no mass  There is no tenderness  There is no rebound  Musculoskeletal: Normal range of motion  He exhibits no edema  Lymphadenopathy:     He has no cervical adenopathy  Neurological: He is alert and oriented to person, place, and time  He has normal reflexes  No cranial nerve deficit  Skin: Skin is warm  No rash noted  No erythema  No pallor  Psychiatric: He has a normal mood and affect  His behavior is normal  Judgment and thought content normal    Nursing note and vitals reviewed  Past Surgical History:   Procedure Laterality Date    COLONOSCOPY      9 years ago       Family History   Problem Relation Age of Onset    Diabetes Mother     Hypertension Mother     Heart disease Father      cardiac disorder    Diabetes Father     Hypertension Father     Prostate cancer Father     Diabetes Sister     Hypertension Sister     Multiple myeloma Brother          Current Outpatient Prescriptions:     amLODIPine (NORVASC) 10 mg tablet, Take 1 tablet (10 mg total) by mouth daily, Disp: 30 tablet, Rfl: 0    bicalutamide (CASODEX) 50 mg tablet, Take 1 tablet (50 mg total) by mouth daily, Disp: 30 tablet, Rfl: 1    Coenzyme Q10 (COQ10) 100 MG CAPS, Take by mouth, Disp: , Rfl:     latanoprost (XALATAN) 0 005 % ophthalmic solution, INSTILL 1 DROP INTO BOTH EYES AT BEDTIME   FILL ON 01-09-18, Disp: , Rfl: 0    MEGARED OMEGA-3 KRILL  MG CAPS, Take by mouth, Disp: , Rfl:     metFORMIN (GLUCOPHAGE) 850 mg tablet, Take 1 tablet by mouth 2 (two) times a day, Disp: , Rfl:     Saw Palmetto, Serenoa repens, 450 MG CAPS, Take by mouth, Disp: , Rfl:     Current Facility-Administered Medications:     leuprolide (LUPRON DEPOT 6 MONTH KIT) IM injection kit 45 mg, 45 mg, Intramuscular, Once, Laya Sanchez MD    No Known Allergies    Vitals:    03/13/18 1047   BP: 132/74   Pulse: 68   Resp: 16   SpO2: 97%   Weight: 94 1 kg (207 lb 6 4 oz)   Height: 5' 10 5" (1 791 m)

## 2018-03-15 ENCOUNTER — TELEPHONE (OUTPATIENT)
Dept: UROLOGY | Facility: CLINIC | Age: 68
End: 2018-03-15

## 2018-03-15 ENCOUNTER — APPOINTMENT (OUTPATIENT)
Dept: LAB | Facility: AMBULARY SURGERY CENTER | Age: 68
End: 2018-03-15
Payer: COMMERCIAL

## 2018-03-15 ENCOUNTER — PROCEDURE VISIT (OUTPATIENT)
Dept: UROLOGY | Facility: CLINIC | Age: 68
End: 2018-03-15
Payer: MEDICARE

## 2018-03-15 VITALS
WEIGHT: 205.4 LBS | HEART RATE: 64 BPM | DIASTOLIC BLOOD PRESSURE: 70 MMHG | SYSTOLIC BLOOD PRESSURE: 120 MMHG | HEIGHT: 69 IN | BODY MASS INDEX: 30.42 KG/M2

## 2018-03-15 DIAGNOSIS — I10 BENIGN ESSENTIAL HYPERTENSION: ICD-10-CM

## 2018-03-15 DIAGNOSIS — C61 PROSTATE CANCER (HCC): Primary | ICD-10-CM

## 2018-03-15 DIAGNOSIS — IMO0001 UNCONTROLLED TYPE 2 DIABETES MELLITUS WITHOUT COMPLICATION, WITHOUT LONG-TERM CURRENT USE OF INSULIN: ICD-10-CM

## 2018-03-15 LAB
CHOLEST SERPL-MCNC: 181 MG/DL (ref 50–200)
CREAT UR-MCNC: 152 MG/DL
EST. AVERAGE GLUCOSE BLD GHB EST-MCNC: 131 MG/DL
HBA1C MFR BLD: 6.2 % (ref 4.2–6.3)
HDLC SERPL-MCNC: 82 MG/DL (ref 40–60)
LDLC SERPL CALC-MCNC: 86 MG/DL (ref 0–100)
MICROALBUMIN UR-MCNC: 6 MG/L (ref 0–20)
MICROALBUMIN/CREAT 24H UR: 4 MG/G CREATININE (ref 0–30)
TRIGL SERPL-MCNC: 66 MG/DL
TSH SERPL DL<=0.05 MIU/L-ACNC: 0.92 UIU/ML (ref 0.36–3.74)

## 2018-03-15 PROCEDURE — 82043 UR ALBUMIN QUANTITATIVE: CPT | Performed by: FAMILY MEDICINE

## 2018-03-15 PROCEDURE — 82570 ASSAY OF URINE CREATININE: CPT | Performed by: FAMILY MEDICINE

## 2018-03-15 PROCEDURE — 3061F NEG MICROALBUMINURIA REV: CPT | Performed by: PHYSICIAN ASSISTANT

## 2018-03-15 PROCEDURE — 80061 LIPID PANEL: CPT

## 2018-03-15 PROCEDURE — 84443 ASSAY THYROID STIM HORMONE: CPT

## 2018-03-15 PROCEDURE — 96402 CHEMO HORMON ANTINEOPL SQ/IM: CPT

## 2018-03-15 PROCEDURE — 36415 COLL VENOUS BLD VENIPUNCTURE: CPT

## 2018-03-15 PROCEDURE — 83036 HEMOGLOBIN GLYCOSYLATED A1C: CPT

## 2018-03-15 NOTE — TELEPHONE ENCOUNTER
Patient questioned at this morning's appointment whether or not he should get the casodex refilled  Per Dr Jalaine Hodgkins pt should get the refill on casodex and continue taking until complete  Leuprolide injection received this morning  Also Dr Jalaine Hodgkins requested PSA to be checked prior to next appt  This order was placed and patient aware  He will go to a [facility]'s lab for testing

## 2018-03-15 NOTE — PROGRESS NOTES
3/15/2018  Ricky Lanza is a 80 y o  male  [id number]    Diagnosis:  Chief Complaint     Prostate Cancer      Patient presents for luprolide injection h/o prostate cancer s/p radiation with Rising PSA managed by Dr Ashley Queen:  Per last ov note, pt to [place] in 6 months with Dr Linn Ivey  He should Follow up with hematology oncology in the meantime  Procedure:    6 month Eligard injection provided without incident to left arm  Patient tolerated well  Reviewed indications and side effects with patient  Brand patient information booklet given to the patient        Administrations This Visit     leuprolide (ELIGARD) injection 45 mg     Admin Date  03/15/2018 Action  Given Dose  45 mg Route  Subcutaneous Administered By  CHIOMA Arreguin RN CURN BSN

## 2018-03-17 ENCOUNTER — HOSPITAL ENCOUNTER (OUTPATIENT)
Dept: RADIOLOGY | Age: 68
Discharge: HOME | End: 2018-03-17
Payer: COMMERCIAL

## 2018-03-17 DIAGNOSIS — C61 NEOPLASM OF PROSTATE, DISTANT METASTASIS STAGING CATEGORY M1B: METASTASIS TO BONE (HCC): ICD-10-CM

## 2018-03-17 DIAGNOSIS — R97.20 RISING PSA LEVEL: ICD-10-CM

## 2018-03-17 DIAGNOSIS — C79.51 NEOPLASM OF PROSTATE, DISTANT METASTASIS STAGING CATEGORY M1B: METASTASIS TO BONE (HCC): ICD-10-CM

## 2018-03-17 PROCEDURE — 77080 DXA BONE DENSITY AXIAL: CPT

## 2018-03-19 ENCOUNTER — APPOINTMENT (OUTPATIENT)
Dept: LAB | Facility: CLINIC | Age: 68
End: 2018-03-19
Payer: COMMERCIAL

## 2018-03-19 ENCOUNTER — TRANSCRIBE ORDERS (OUTPATIENT)
Dept: LAB | Facility: CLINIC | Age: 68
End: 2018-03-19

## 2018-03-19 DIAGNOSIS — Z12.11 SPECIAL SCREENING FOR MALIGNANT NEOPLASMS, COLON: Primary | ICD-10-CM

## 2018-03-19 DIAGNOSIS — Z12.11 SPECIAL SCREENING FOR MALIGNANT NEOPLASMS, COLON: ICD-10-CM

## 2018-03-19 LAB — HEMOCCULT STL QL IA: POSITIVE

## 2018-03-19 PROCEDURE — G0328 FECAL BLOOD SCRN IMMUNOASSAY: HCPCS

## 2018-03-20 ENCOUNTER — OFFICE VISIT (OUTPATIENT)
Dept: HEMATOLOGY ONCOLOGY | Facility: CLINIC | Age: 68
End: 2018-03-20
Payer: MEDICARE

## 2018-03-20 VITALS
BODY MASS INDEX: 29.35 KG/M2 | SYSTOLIC BLOOD PRESSURE: 140 MMHG | DIASTOLIC BLOOD PRESSURE: 78 MMHG | OXYGEN SATURATION: 100 % | HEIGHT: 70 IN | TEMPERATURE: 97.7 F | WEIGHT: 205 LBS | HEART RATE: 68 BPM

## 2018-03-20 DIAGNOSIS — C61 PROSTATE CANCER (HCC): Primary | ICD-10-CM

## 2018-03-20 PROCEDURE — 99214 OFFICE O/P EST MOD 30 MIN: CPT | Performed by: INTERNAL MEDICINE

## 2018-03-20 RX ORDER — DIPHENOXYLATE HYDROCHLORIDE AND ATROPINE SULFATE 2.5; .025 MG/1; MG/1
1 TABLET ORAL DAILY
COMMUNITY

## 2018-03-20 NOTE — PROGRESS NOTES
HEMATOLOGY / ONCOLOGY CLINIC NOTE    Primary Care Provider: Compa Radford MD  Referring Provider:    MRN: [id number]  : 1950    Reason for Encounter:  Chief Complaint   Patient presents with    Follow-up     follow up after dexiscan         History of Hematology / Oncology Illness:     Stephan Sloan is a 80 y o  male who came in for follow up  Stage iv  , Castration naive metastatic prostate Cancer,  With right pelvic bone lesion  D/X: PSA doubled from 1 8-3 6 within 9 month;  Bone scan positive for increased activity in right hemipelvis,   Suggesting metastatic lesion  No biopsy  Tr:     - Received Chalo Mahoney in 2018,  Not switch to Casodex plus Lupron managed by Urology  - on calcium vitamin-D has patient is on ADT  - no indication for chemo based on the Charrted trial as there is no visceral organ involvement and bone lesion is not diffuse ( < 4)  -   Repeat bone scan, We will consider Zometa  If there is still bone lesion  Interval History:       3/20:   Patient came in for follow-up  Reported overall doing well  He is actively exercising, losing weight, no new bone pains,  No change of urination bowel movements  Taking casodex, Lupron, no major side effects  Problem list:     Patient Active Problem List   Diagnosis    Sprain of right wrist    Swelling of joint, wrist, right    Rising PSA level    Prostate cancer ([address] )    Obesity (BMI 30 0-34  9)    Neoplasm of prostate, 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 ([address] )    Glaucoma    Diabetes mellitus out of control ([address] )    Benign essential hypertension    Colon cancer screening       Assessment / Plan:     1  Prostate cancer ([address] )  -   Continue Casodex Lupron  managed by Urology  -   Based on NCCN guideline, patient will need blood work CBC, CMP, PSA every 3 months    Next one in   -    Bone scan every 6 months, next one in April, then in October (  Ordered by Urology)  -    I will follow patient every 6 months, alternate with Urology  I will see patient in 3 months in June, he would then see Dr Martine Mcclendon  in September         - CBC and differential; Standing  - Comprehensive metabolic panel; Standing  - PSA Total, Diagnostic; Standing  - CBC and differential  - Comprehensive metabolic panel  - PSA Total, Diagnostic  - NM bone scan whole body; Future                  25    minutes were spent on this visit  All questions answered to satisfaction; Advised pt to call if there is any further questions  PHYSICIAL EXAMINATION:     Vital Signs:   [unfilled]  Body mass index is 29 41 kg/m²  Body surface area is 2 11 meters squared  GEN: Alert, awake oriented x3, in no acute distress  HEENT- No pallor, icterus, cyanosis, no oral mucosal lesions,   LAD - no palpable cervical, clavicle, axillary, inguinal LAD  Heart- normal S1 S2, regular rate and rhythm, No murmur, rubs  Lungs- clear breathing sound bilateral    Abdomen- soft, Non tender, bowel sounds present  Extremities- No cyanosis, clubbing, edema  Neuro- No focal neurological deficit           PAST MEDICAL HISTORY:   has a past medical history of radiation therapy; Malignant neoplasm of prostate ([facility] ); and Shingles (02/22/2018)  PAST SURGICAL HISTORY:   has a past surgical history that includes Colonoscopy  CURRENT MEDICATIONS:   Current Outpatient Prescriptions   Medication Sig Dispense Refill    amLODIPine (NORVASC) 10 mg tablet Take 1 tablet (10 mg total) by mouth daily 30 tablet 0    bicalutamide (CASODEX) 50 mg tablet Take 1 tablet (50 mg total) by mouth daily 30 tablet 1    Coenzyme Q10 (COQ10) 100 MG CAPS Take by mouth      latanoprost (XALATAN) 0 005 % ophthalmic solution INSTILL 1 DROP INTO BOTH EYES AT BEDTIME   FILL ON 01-09-18  0    MEGARED OMEGA-3 KRILL  MG CAPS Take by mouth      metFORMIN (GLUCOPHAGE) 850 mg tablet Take 1 tablet by mouth 2 (two) times a day      multivitamin (THERAGRAN) TABS Take 1 tablet by mouth daily      Saw Palmetto, Serenoa repens, 450 MG CAPS Take by mouth       No current facility-administered medications for this visit  [unfilled]    SOCIAL HISTORY:   reports that he quit smoking about 49 years ago  He has never used smokeless tobacco  He reports that he drinks about 1 2 oz of alcohol per week   He reports that he does not use drugs  FAMILY HISTORY:  family history includes Diabetes in his father, mother, and sister; Heart disease in his father; Hypertension in his father, mother, and sister; Multiple myeloma in his brother; Prostate cancer in his father  ALLERGIES:  is allergic to lactate  REVIEW OF SYSTEMS:  Please note that a 14-point review of systems was performed to include Constitutional, HEENT, Respiratory, CVS, GI, , Musculoskeletal, Integumentary, Neurologic, Rheumatologic, Endocrinologic, Psychiatric, Lymphatic, and Hematologic/Oncologic systems were reviewed and are negative unless otherwise stated in HPI  Positive and negative findings pertinent to this evaluation are incorporated into the history of present illness  LAB:  Lab Results   Component Value Date    WBC 5 30 02/15/2018    HGB 14 0 02/15/2018    HCT 42 7 02/15/2018    MCV 76 (L) 02/15/2018     02/15/2018     Lab Results   Component Value Date     02/15/2018    K 4 7 02/15/2018     02/15/2018    CO2 31 02/15/2018    ANIONGAP 4 02/15/2018    BUN 17 02/15/2018    CREATININE 0 83 02/15/2018    GLUCOSE 109 02/06/2018    GLUF 113 (H) 02/15/2018    CALCIUM 9 1 02/15/2018    AST 19 02/15/2018    ALT 21 02/15/2018    ALKPHOS 104 02/15/2018    PROT 7 8 02/15/2018    BILITOT 0 39 02/15/2018    EGFR 105 02/15/2018       IMAGING:  NM bone scan whole body    (Results Pending)     Dxa Bone Density Spine Hip And Pelvis    Result Date: 3/19/2018  Narrative: CENTRAL  DXA SCAN CLINICAL HISTORY:   80year old -American male risk factors include previous smoking  History of prostate carcinoma  Non-insulin-dependent diabetes  Osteoporosis screening  TECHNIQUE: Bone densitometry was performed using a Horizon A bone densitometer  Regions of interest appear properly placed  There are no obvious fractures or other confounding variables which could limit the study  Degenerative changes of the lumbar spine and hip  This will falsely elevate the bone mineral densities in these regions  COMPARISON:  None  RESULTS: LUMBAR SPINE:  L1-L4: BMD 1 269 gm/cm2 T-score 1 6 Z-score 1 5 LUMBAR SPINE L2-L4 (average) : BMD 1 325 gm/sq-cm            T-score is 1 9            Z-score is 1 8 LEFT TOTAL HIP: BMD 1 175 gm/cm2 T-score 0 9 Z-score 0 9 LEFT FEMORAL NECK: BMD 0 965 gm/cm2 T-score 0 3 Z-score 0 7     Impression: 1  Based on the [facility] classification, this study is normal and the patient is considered at low risk for fracture  2  A daily intake of calcium of at least 1200 mg and vitamin D, 800-1000 IU, as well as weight bearing and muscle strengthening exercise, fall prevention and avoidance of tobacco and excessive alcohol intake as basic preventive measures are recommended  3  Repeat DXA scan on the same equipment in 18-24 months as clinically indicated  The 10 year risk of hip fracture is 0 1%, with the 10 year risk of major osteoporotic fracture being 1 6%, as calculated by the [facility] fracture risk assessment tool (FRAX)  The current NOF guidelines recommend treating patients with FRAX 10 year risk score  of >3% for hip fracture and >20% for major osteoporotic fracture   WHO CLASSIFICATION: Normal (a T-score of -1 0 or higher) Low bone mineral density (a T-score of less than -1 0 but higher than -2 5) Osteoporosis (a T-score of -2 5 or less) Severe osteoporosis (a T-score of -2 5 or less with a fragility fracture)   Workstation performed: [id number]

## 2018-03-20 NOTE — LETTER
2018     Leandra Henderson MD  [address] [place] [address]    Patient: Devante Fairchild   YOB: 1950   Date of Visit: 3/20/2018       Dear Dr Deion Francois: Thank you for referring Devante Fairchild to me for evaluation  Below are my notes for this consultation  If you have questions, please do not hesitate to call me  I look forward to following your patient along with you  Sincerely,        Mariangel Jolly MD PhD        CC: No Recipients  Mariangel Jolly MD PhD  3/20/2018  9:41 AM  Sign at close encounter  [address] [place] [address] / [address] [place] [address] NOTE    Primary Care Provider: Siobhan Baker MD  Referring Provider:    MRN: [id number]  : 1950    Reason for Encounter:  Chief Complaint   Patient presents with    Follow-up     follow up after dexiscan         History of Hematology / Oncology Illness:     Devante Fairchild is a 80 y o  male who came in for follow up  Stage iv  , Castration naive metastatic prostate Cancer,  With right pelvic bone lesion  D/X: PSA doubled from 1 8-3 6 within 9 month;  Bone scan positive for increased activity in right hemipelvis,   Suggesting metastatic lesion  No biopsy  Tr:     - Received [facility] [facility] in 2018,  Not switch to Casodex plus Lupron managed by Urology  - on calcium vitamin-D has patient is on ADT  - no indication for chemo based on the Charrted trial as there is no visceral organ involvement and bone lesion is not diffuse ( < 4)  -   Repeat bone scan, We will consider Zometa  If there is still bone lesion  Interval History:       3/20:   Patient came in for follow-up  Reported overall doing well  He is actively exercising, losing weight, no new bone pains,  No change of urination bowel movements  Taking casodex, Lupron, no major side effects           Problem list:     Patient Active Problem List   Diagnosis    Sprain of right wrist    Swelling of joint, wrist, right    Rising PSA level    Prostate cancer ([address] )    Obesity (BMI 30 0-34  9)    Neoplasm of prostate, 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 ([facility] )    Glaucoma    Diabetes mellitus out of control ([facility] )    Benign essential hypertension    Colon cancer screening       Assessment / Plan:     1  Prostate cancer ([facility] )  -   Continue Casodex Lupron  managed by Urology  -   Based on NCCN guideline, patient will need blood work CBC, CMP, PSA every 3 months  Next one in June  -    Bone scan every 6 months, next one in April, then in October (  Ordered by Urology)  -    I will follow patient every 6 months, alternate with Urology  I will see patient in 3 months in June, he would then see Dr Deion Francois  in September         - CBC and differential; Standing  - Comprehensive metabolic panel; Standing  - PSA Total, Diagnostic; Standing  - CBC and differential  - Comprehensive metabolic panel  - PSA Total, Diagnostic  - NM bone scan whole body; Future                  25    minutes were spent on this visit  All questions answered to satisfaction; Advised pt to call if there is any further questions  PHYSICIAL EXAMINATION:     Vital Signs:   [unfilled]  Body mass index is 29 41 kg/m²  Body surface area is 2 11 meters squared  GEN: Alert, awake oriented x3, in no acute distress  HEENT- No pallor, icterus, cyanosis, no oral mucosal lesions,   LAD - no palpable cervical, clavicle, axillary, inguinal LAD  Heart- normal S1 S2, regular rate and rhythm, No murmur, rubs  Lungs- clear breathing sound bilateral    Abdomen- soft, Non tender, bowel sounds present  Extremities- No cyanosis, clubbing, edema  Neuro- No focal neurological deficit           PAST MEDICAL HISTORY:   has a past medical history of radiation therapy; Malignant neoplasm of prostate ([facility] ); and Shingles (02/22/2018)  PAST SURGICAL HISTORY:   has a past surgical history that includes Colonoscopy      CURRENT MEDICATIONS:   Current Outpatient Prescriptions   Medication Sig Dispense Refill    amLODIPine (NORVASC) 10 mg tablet Take 1 tablet (10 mg total) by mouth daily 30 tablet 0    bicalutamide (CASODEX) 50 mg tablet Take 1 tablet (50 mg total) by mouth daily 30 tablet 1    Coenzyme Q10 (COQ10) 100 MG CAPS Take by mouth      latanoprost (XALATAN) 0 005 % ophthalmic solution INSTILL 1 DROP INTO BOTH EYES AT BEDTIME  FILL ON 01-09-18  0    MEGARED OMEGA-3 KRILL  MG CAPS Take by mouth      metFORMIN (GLUCOPHAGE) 850 mg tablet Take 1 tablet by mouth 2 (two) times a day      multivitamin (THERAGRAN) TABS Take 1 tablet by mouth daily      Saw Palmetto, Serenoa repens, 450 MG CAPS Take by mouth       No current facility-administered medications for this visit  [unfilled]    SOCIAL HISTORY:   reports that he quit smoking about 49 years ago  He has never used smokeless tobacco  He reports that he drinks about 1 2 oz of alcohol per week   He reports that he does not use drugs  FAMILY HISTORY:  family history includes Diabetes in his father, mother, and sister; Heart disease in his father; Hypertension in his father, mother, and sister; Multiple myeloma in his brother; Prostate cancer in his father  ALLERGIES:  is allergic to lactate  REVIEW OF SYSTEMS:  Please note that a 14-point review of systems was performed to include Constitutional, HEENT, Respiratory, CVS, GI, , Musculoskeletal, Integumentary, Neurologic, Rheumatologic, Endocrinologic, Psychiatric, Lymphatic, and Hematologic/Oncologic systems were reviewed and are negative unless otherwise stated in HPI  Positive and negative findings pertinent to this evaluation are incorporated into the history of present illness              LAB:  Lab Results   Component Value Date    WBC 5 30 02/15/2018    HGB 14 0 02/15/2018    HCT 42 7 02/15/2018    MCV 76 (L) 02/15/2018     02/15/2018     Lab Results   Component Value Date     02/15/2018    K 4 7 02/15/2018     02/15/2018    CO2 31 02/15/2018    ANIONGAP 4 02/15/2018 BUN 17 02/15/2018    CREATININE 0 83 02/15/2018    GLUCOSE 109 02/06/2018    GLUF 113 (H) 02/15/2018    CALCIUM 9 1 02/15/2018    AST 19 02/15/2018    ALT 21 02/15/2018    ALKPHOS 104 02/15/2018    PROT 7 8 02/15/2018    BILITOT 0 39 02/15/2018    EGFR 105 02/15/2018       IMAGING:  NM bone scan whole body    (Results Pending)     Dxa Bone Density Spine Hip And Pelvis    Result Date: 3/19/2018  Narrative: CENTRAL  DXA SCAN CLINICAL HISTORY:   80year old -American male risk factors include previous smoking  History of prostate carcinoma  Non-insulin-dependent diabetes  Osteoporosis screening  TECHNIQUE: Bone densitometry was performed using a Horizon A bone densitometer  Regions of interest appear properly placed  There are no obvious fractures or other confounding variables which could limit the study  Degenerative changes of the lumbar spine and hip  This will falsely elevate the bone mineral densities in these regions  COMPARISON:  None  RESULTS: LUMBAR SPINE:  L1-L4: BMD 1 269 gm/cm2 T-score 1 6 Z-score 1 5 LUMBAR SPINE L2-L4 (average) : BMD 1 325 gm/sq-cm            T-score is 1 9            Z-score is 1 8 LEFT TOTAL HIP: BMD 1 175 gm/cm2 T-score 0 9 Z-score 0 9 LEFT FEMORAL NECK: BMD 0 965 gm/cm2 T-score 0 3 Z-score 0 7     Impression: 1  Based on the [facility] classification, this study is normal and the patient is considered at low risk for fracture  2  A daily intake of calcium of at least 1200 mg and vitamin D, 800-1000 IU, as well as weight bearing and muscle strengthening exercise, fall prevention and avoidance of tobacco and excessive alcohol intake as basic preventive measures are recommended  3  Repeat DXA scan on the same equipment in 18-24 months as clinically indicated  The 10 year risk of hip fracture is 0 1%, with the 10 year risk of major osteoporotic fracture being 1 6%, as calculated by the [facility] fracture risk assessment tool (FRAX)    The current NOF guidelines recommend treating patients with FRAX 10 year risk score  of >3% for hip fracture and >20% for major osteoporotic fracture   WHO CLASSIFICATION: Normal (a T-score of -1 0 or higher) Low bone mineral density (a T-score of less than -1 0 but higher than -2 5) Osteoporosis (a T-score of -2 5 or less) Severe osteoporosis (a T-score of -2 5 or less with a fragility fracture)   Workstation performed: [id number]

## 2018-03-27 ENCOUNTER — OFFICE VISIT (OUTPATIENT)
Dept: FAMILY MEDICINE CLINIC | Facility: CLINIC | Age: 68
End: 2018-03-27
Payer: MEDICARE

## 2018-03-27 VITALS
WEIGHT: 205 LBS | HEART RATE: 87 BPM | DIASTOLIC BLOOD PRESSURE: 68 MMHG | BODY MASS INDEX: 29.35 KG/M2 | RESPIRATION RATE: 16 BRPM | OXYGEN SATURATION: 97 % | HEIGHT: 70 IN | SYSTOLIC BLOOD PRESSURE: 110 MMHG

## 2018-03-27 DIAGNOSIS — C61 PROSTATE CANCER (HCC): ICD-10-CM

## 2018-03-27 DIAGNOSIS — R19.5 OCCULT BLOOD POSITIVE STOOL: ICD-10-CM

## 2018-03-27 DIAGNOSIS — I10 BENIGN ESSENTIAL HYPERTENSION: Primary | ICD-10-CM

## 2018-03-27 DIAGNOSIS — I10 ESSENTIAL HYPERTENSION: ICD-10-CM

## 2018-03-27 DIAGNOSIS — E11.9 TYPE 2 DIABETES MELLITUS WITHOUT COMPLICATION, WITHOUT LONG-TERM CURRENT USE OF INSULIN (HCC): ICD-10-CM

## 2018-03-27 PROBLEM — E66.811 OBESITY (BMI 30.0-34.9): Status: RESOLVED | Noted: 2017-06-06 | Resolved: 2018-03-27

## 2018-03-27 PROBLEM — E66.9 OBESITY (BMI 30.0-34.9): Status: RESOLVED | Noted: 2017-06-06 | Resolved: 2018-03-27

## 2018-03-27 PROBLEM — IMO0002 DIABETES MELLITUS OUT OF CONTROL: Status: RESOLVED | Noted: 2017-06-06 | Resolved: 2018-03-27

## 2018-03-27 PROCEDURE — 99214 OFFICE O/P EST MOD 30 MIN: CPT | Performed by: FAMILY MEDICINE

## 2018-03-27 RX ORDER — AMLODIPINE BESYLATE 10 MG/1
10 TABLET ORAL DAILY
Qty: 90 TABLET | Refills: 1 | Status: SHIPPED | OUTPATIENT
Start: 2018-03-27 | End: 2018-09-18 | Stop reason: SDUPTHER

## 2018-03-27 NOTE — PROGRESS NOTES
Assessment/Plan:    Problem List Items Addressed This Visit        Endocrine    Type 2 diabetes mellitus without complication ([facility] )     DIABETES IS WELL CONTROLLED, HEMOGLOBIN A1C 6 2 NOW, CONTINUE METFORMIN 850 MG TWICE A DAY  Recheck labs in 3-4 months and then follow         Relevant Orders    CBC and differential    Comprehensive metabolic panel    HEMOGLOBIN A1C W/ EAG ESTIMATION    Lipid panel    TSH, 3rd generation       Cardiovascular and Mediastinum    Benign essential hypertension - Primary      Hypertension is stable, he will continue his medication and refill will be given         Relevant Medications    amLODIPine (NORVASC) 10 mg tablet    Other Relevant Orders    Comprehensive metabolic panel       Genitourinary    Prostate cancer ([facility] )       Other    Occult blood positive stool      Occult blood is positive and stool testing, advised to have colonoscopy and see a gastroenterologist         Relevant Orders    CBC and differential    Ambulatory referral to Gastroenterology      Other Visit Diagnoses     Essential hypertension        Relevant Medications    amLODIPine (NORVASC) 10 mg tablet    Other Relevant Orders    Lipid panel          Chief Complaint   Patient presents with    Follow-up     REVIEW LABS       Subjective:   Patient ID: Jacquie Mahoney is a 80 y o  male  He is here follow-up on diabetes, hypertension and he is taking metformin twice a day and he had his recent labs done and it shows significant improvement in his hemoglobin A1c,  He has stage IV prostate cancer and getting his treatment,  He is Hemoccult test  positive in cologaurd  stool test, he denies any rectal bleeding  And he has normal bowel movements  He says he has hemorrhoids,  He had 1 colonoscopy about 10 years ago,          Review of Systems   Constitutional: Negative for activity change, appetite change, chills, diaphoresis, fatigue, fever and unexpected weight change     HENT: Negative for congestion, dental problem, ear discharge, ear pain, facial swelling, hearing loss, mouth sores, nosebleeds, postnasal drip, rhinorrhea, sinus pain, sinus pressure, sneezing, sore throat, trouble swallowing and voice change  Eyes: Negative for photophobia, pain, discharge, redness and itching  Respiratory: Negative for cough, chest tightness, shortness of breath and wheezing  Cardiovascular: Negative for chest pain, palpitations and leg swelling  Gastrointestinal: Negative for abdominal distention, abdominal pain, blood in stool, constipation, diarrhea and nausea  Endocrine: Negative for cold intolerance, heat intolerance, polydipsia, polyphagia and polyuria  Genitourinary: Negative for dysuria, flank pain, frequency, hematuria and urgency  Musculoskeletal: Negative for arthralgias, back pain, myalgias and neck pain  Skin: Negative for color change and pallor  Allergic/Immunologic: Negative for environmental allergies and food allergies  Neurological: Negative for dizziness, weakness, light-headedness, numbness and headaches  Hematological: Negative for adenopathy  Does not bruise/bleed easily  Psychiatric/Behavioral: Negative for behavioral problems, sleep disturbance and suicidal ideas  The patient is not nervous/anxious  Objective:  Physical Exam   Constitutional: He appears well-developed and well-nourished  HENT:   Head: Normocephalic and atraumatic  Mouth/Throat: Oropharynx is clear and moist    Eyes: Conjunctivae and EOM are normal  Pupils are equal, round, and reactive to light  No scleral icterus  Neck: Normal range of motion  Neck supple  No thyromegaly present  Cardiovascular: Normal rate, regular rhythm and normal heart sounds  Pulmonary/Chest: Effort normal and breath sounds normal  He has no wheezes  He has no rales  Lymphadenopathy:     He has no cervical adenopathy  Neurological: He is alert  Skin: No rash noted  No erythema  Psychiatric: He has a normal mood and affect  Nursing note and vitals reviewed  Past Surgical History:   Procedure Laterality Date    COLONOSCOPY      9 years ago       Family History   Problem Relation Age of Onset    Diabetes Mother     Hypertension Mother     Heart disease Father      cardiac disorder    Diabetes Father     Hypertension Father     Prostate cancer Father     Diabetes Sister     Hypertension Sister     Multiple myeloma Brother          Current Outpatient Prescriptions:     amLODIPine (NORVASC) 10 mg tablet, Take 1 tablet (10 mg total) by mouth daily, Disp: 90 tablet, Rfl: 1    bicalutamide (CASODEX) 50 mg tablet, Take 1 tablet (50 mg total) by mouth daily, Disp: 30 tablet, Rfl: 1    Coenzyme Q10 (COQ10) 100 MG CAPS, Take by mouth, Disp: , Rfl:     latanoprost (XALATAN) 0 005 % ophthalmic solution, INSTILL 1 DROP INTO BOTH EYES AT BEDTIME   FILL ON 01-09-18, Disp: , Rfl: 0    MEGARED OMEGA-3 KRILL  MG CAPS, Take by mouth, Disp: , Rfl:     metFORMIN (GLUCOPHAGE) 850 mg tablet, Take 1 tablet by mouth 2 (two) times a day, Disp: , Rfl:     multivitamin (THERAGRAN) TABS, Take 1 tablet by mouth daily, Disp: , Rfl:     Saw Palmetto, Serenoa repens, 450 MG CAPS, Take by mouth, Disp: , Rfl:     Allergies   Allergen Reactions    Lactate Diarrhea       Vitals:    03/27/18 0950   BP: 110/68   Pulse: 87   Resp: 16   SpO2: 97%   Weight: 93 kg (205 lb)   Height: 5' 10" (1 778 m)

## 2018-03-27 NOTE — ASSESSMENT & PLAN NOTE
DIABETES IS WELL CONTROLLED, HEMOGLOBIN A1C 6 2 NOW, CONTINUE METFORMIN 850 MG TWICE A DAY   Recheck labs in 3-4 months and then follow

## 2018-03-27 NOTE — ASSESSMENT & PLAN NOTE
Occult blood is positive and stool testing, advised to have colonoscopy and see a gastroenterologist

## 2018-04-04 ENCOUNTER — TELEPHONE (OUTPATIENT)
Dept: NUTRITION | Facility: HOSPITAL | Age: 68
End: 2018-04-04

## 2018-04-04 NOTE — TELEPHONE ENCOUNTER
Referral received for pt with questions regarding diet changes to maximize his muscle and bone health while on androgen deprivation therapy  Spoke with pt today via phone who reports that he has lost 25# so far and is getting his nutrition questions addressed through the [facility]  Offered RD consult (including phone consult) to pt who said he may call next month to set up an appointment, but for now he does not want to schedule  Pt took this RD's name and number and will call for an appointment when desired

## 2018-04-13 ENCOUNTER — HOSPITAL ENCOUNTER (OUTPATIENT)
Dept: NUCLEAR MEDICINE | Facility: HOSPITAL | Age: 68
Discharge: HOME | End: 2018-04-13
Attending: INTERNAL MEDICINE
Payer: MEDICARE

## 2018-04-13 DIAGNOSIS — C61 PROSTATE CANCER (HCC): ICD-10-CM

## 2018-04-13 PROCEDURE — A9503 TC99M MEDRONATE: HCPCS

## 2018-04-13 PROCEDURE — 78306 BONE IMAGING WHOLE BODY: CPT

## 2018-04-20 DIAGNOSIS — R97.20 RISING PSA LEVEL: ICD-10-CM

## 2018-04-20 DIAGNOSIS — C61 NEOPLASM OF PROSTATE, DISTANT METASTASIS STAGING CATEGORY M1B: METASTASIS TO BONE (HCC): ICD-10-CM

## 2018-04-20 DIAGNOSIS — C79.51 NEOPLASM OF PROSTATE, DISTANT METASTASIS STAGING CATEGORY M1B: METASTASIS TO BONE (HCC): ICD-10-CM

## 2018-04-20 RX ORDER — BICALUTAMIDE 50 MG/1
50 TABLET, FILM COATED ORAL DAILY
Qty: 30 TABLET | Refills: 1 | OUTPATIENT
Start: 2018-04-20

## 2018-04-20 NOTE — TELEPHONE ENCOUNTER
I received a refill request for Casodex, the patient has received Ford Dole and then an injection of Lupron  There is no indication to continue Casodex at this time

## 2018-05-07 ENCOUNTER — OFFICE VISIT (OUTPATIENT)
Dept: GASTROENTEROLOGY | Facility: AMBULARY SURGERY CENTER | Age: 68
End: 2018-05-07
Payer: MEDICARE

## 2018-05-07 VITALS
WEIGHT: 201 LBS | DIASTOLIC BLOOD PRESSURE: 70 MMHG | TEMPERATURE: 97.5 F | BODY MASS INDEX: 28.77 KG/M2 | HEART RATE: 74 BPM | HEIGHT: 70 IN | SYSTOLIC BLOOD PRESSURE: 125 MMHG

## 2018-05-07 DIAGNOSIS — Z12.11 COLON CANCER SCREENING: Primary | ICD-10-CM

## 2018-05-07 DIAGNOSIS — R19.5 OCCULT BLOOD POSITIVE STOOL: ICD-10-CM

## 2018-05-07 PROCEDURE — 99204 OFFICE O/P NEW MOD 45 MIN: CPT | Performed by: INTERNAL MEDICINE

## 2018-05-07 NOTE — LETTER
May 7, 2018     Curtis Servin MD  [address]  [facility] [facility] [address]    Patient: Jo Jones   YOB: 1950   Date of Visit: 5/7/2018       Dear Dr Faby Ortega:    Thank you for referring Jo Jones to me for evaluation  Below are my notes for this consultation  If you have questions, please do not hesitate to call me  I look forward to following your patient along with you           Sincerely,        Mark Carmichael MD        CC: No Recipients

## 2018-05-07 NOTE — PROGRESS NOTES
Consultation - [address] [facility]  Richardson Lao 80 y o  male MRN: [id number]  Unit/Bed#:  Encounter: [id number]        Consults    ASSESSMENT/PLAN:   1  Colon cancer screening-last colonoscopy was in 2006, recently had heme-positive stools, no overt bleeding, abdominal pain or change in bowel habits  -will schedule diagnostic colonoscopy given heme-positive stools  - Patient was explained about  the risks and benefits of the procedure  Risks including but not limited to bleeding, infection, perforation were explained in detail  Also explained about less than 100% sensitivity with the exam and other alternatives  -hemoglobin recently was 14, platelets of 912, normal liver function  TSH is 0 919                 ______________________________________________________________________    Reason for Consult / Principal Problem: [unfilled]    HPI: Richradson Lao is a 80y o  year old male with history of stage IV prostate cancer status post radiation 8 or 9 years ago, currently on Lupron and androgen receptor blocker comes in for evaluation of colon cancer screening  Patient states his last colonoscopy was over 10 years ago  He denies knowledge of any polyps at that time  Most recently he underwent stool testing which showed heme-positive stools  He denies hematemesis, change in bowel habits, hematochezia or melena  He does have hemorrhoids as per patient report  Patient was initially reluctant about the colonoscopy due to history of radiation, he is concerned about the risk of possible perforation  We discussed alternatives including CT colonography however should he have any polyps on that, he would ultimately need a colonoscopy  Patient is agreeable to proceed with colonoscopy at this time  He denies any upper GI symptoms including heartburn, hematemesis, dysphagia, or unintentional weight loss  Review of Systems:   The remainder of the review of systems was negative except for the pertinent positives noted in HPI  Historical Information   Past Medical History:   Diagnosis Date    Hx of radiation therapy     Malignant neoplasm of prostate ([address] )     Shingles 02/22/2018     Past Surgical History:   Procedure Laterality Date    COLONOSCOPY      9 years ago     Social History   History   Alcohol Use No     Comment: socially     History   Drug Use No     History   Smoking Status    Former Smoker    Quit date: 1969   Smokeless Tobacco    Never Used     Family History   Problem Relation Age of Onset    Diabetes Mother     Hypertension Mother     Heart disease Father      cardiac disorder    Diabetes Father     Hypertension Father     Prostate cancer Father     Diabetes Sister     Hypertension Sister     Multiple myeloma Brother        Meds/Allergies       (Not in a hospital admission)  No current facility-administered medications for this visit  Allergies   Allergen Reactions    Lactate Diarrhea       Objective     Blood pressure 125/70, pulse 74, temperature 97 5 °F (36 4 °C), temperature source Tympanic, height 5' 10" (1 778 m), weight 91 2 kg (201 lb)  [unfilled]    PHYSICAL EXAM     GEN: well nourished, well developed, no acute distress  HEENT: anicteric, MMM, no cervical or supraclavicular lymphadenopathy  CV: RRR, no m/r/g  CHEST: CTA b/l, no WRR  ABD: +BS, soft, NT/ND, no hepatosplenomegaly  EXT: no c/c/e  SKIN: no rashes,  NEURO: aaox3    Lab Results:   No visits with results within 1 Day(s) from this visit     Latest known visit with results is:   Appointment on 03/19/2018   Component Date Value    OCCULT BLD, FECAL IMMUNO* 03/19/2018 Positive*     Imaging Studies: I have personally reviewed pertinent films in PACS

## 2018-05-09 ENCOUNTER — OFFICE VISIT (OUTPATIENT)
Dept: HEMATOLOGY ONCOLOGY | Facility: CLINIC | Age: 68
End: 2018-05-09
Payer: MEDICARE

## 2018-05-09 ENCOUNTER — APPOINTMENT (OUTPATIENT)
Dept: LAB | Facility: AMBULARY SURGERY CENTER | Age: 68
End: 2018-05-09
Payer: COMMERCIAL

## 2018-05-09 VITALS
OXYGEN SATURATION: 98 % | SYSTOLIC BLOOD PRESSURE: 150 MMHG | HEIGHT: 70 IN | DIASTOLIC BLOOD PRESSURE: 90 MMHG | HEART RATE: 69 BPM | BODY MASS INDEX: 29.28 KG/M2 | RESPIRATION RATE: 16 BRPM | WEIGHT: 204.5 LBS | TEMPERATURE: 99.4 F

## 2018-05-09 DIAGNOSIS — C79.51 NEOPLASM OF PROSTATE, DISTANT METASTASIS STAGING CATEGORY M1B: METASTASIS TO BONE (HCC): Primary | ICD-10-CM

## 2018-05-09 DIAGNOSIS — C61 NEOPLASM OF PROSTATE, DISTANT METASTASIS STAGING CATEGORY M1B: METASTASIS TO BONE (HCC): Primary | ICD-10-CM

## 2018-05-09 LAB
ALBUMIN SERPL BCP-MCNC: 3.8 G/DL (ref 3.5–5)
ALP SERPL-CCNC: 105 U/L (ref 46–116)
ALT SERPL W P-5'-P-CCNC: 24 U/L (ref 12–78)
ANION GAP SERPL CALCULATED.3IONS-SCNC: 6 MMOL/L (ref 4–13)
AST SERPL W P-5'-P-CCNC: 22 U/L (ref 5–45)
BASOPHILS # BLD AUTO: 0.04 THOUSANDS/ΜL (ref 0–0.1)
BASOPHILS NFR BLD AUTO: 1 % (ref 0–1)
BILIRUB SERPL-MCNC: 0.44 MG/DL (ref 0.2–1)
BUN SERPL-MCNC: 20 MG/DL (ref 5–25)
CALCIUM SERPL-MCNC: 9.4 MG/DL (ref 8.3–10.1)
CHLORIDE SERPL-SCNC: 106 MMOL/L (ref 100–108)
CO2 SERPL-SCNC: 28 MMOL/L (ref 21–32)
CREAT SERPL-MCNC: 0.69 MG/DL (ref 0.6–1.3)
EOSINOPHIL # BLD AUTO: 0.17 THOUSAND/ΜL (ref 0–0.61)
EOSINOPHIL NFR BLD AUTO: 4 % (ref 0–6)
ERYTHROCYTE [DISTWIDTH] IN BLOOD BY AUTOMATED COUNT: 15.6 % (ref 11.6–15.1)
GFR SERPL CREATININE-BSD FRML MDRD: 114 ML/MIN/1.73SQ M
GLUCOSE SERPL-MCNC: 96 MG/DL (ref 65–140)
HCT VFR BLD AUTO: 41.8 % (ref 36.5–49.3)
HGB BLD-MCNC: 13.2 G/DL (ref 12–17)
LYMPHOCYTES # BLD AUTO: 1.54 THOUSANDS/ΜL (ref 0.6–4.47)
LYMPHOCYTES NFR BLD AUTO: 33 % (ref 14–44)
MCH RBC QN AUTO: 24.5 PG (ref 26.8–34.3)
MCHC RBC AUTO-ENTMCNC: 31.6 G/DL (ref 31.4–37.4)
MCV RBC AUTO: 78 FL (ref 82–98)
MONOCYTES # BLD AUTO: 0.48 THOUSAND/ΜL (ref 0.17–1.22)
MONOCYTES NFR BLD AUTO: 10 % (ref 4–12)
NEUTROPHILS # BLD AUTO: 2.47 THOUSANDS/ΜL (ref 1.85–7.62)
NEUTS SEG NFR BLD AUTO: 52 % (ref 43–75)
NRBC BLD AUTO-RTO: 0 /100 WBCS
PLATELET # BLD AUTO: 196 THOUSANDS/UL (ref 149–390)
PMV BLD AUTO: 11.2 FL (ref 8.9–12.7)
POTASSIUM SERPL-SCNC: 4 MMOL/L (ref 3.5–5.3)
PROT SERPL-MCNC: 7.4 G/DL (ref 6.4–8.2)
PSA SERPL-MCNC: <0.1 NG/ML (ref 0–4)
RBC # BLD AUTO: 5.38 MILLION/UL (ref 3.88–5.62)
SODIUM SERPL-SCNC: 140 MMOL/L (ref 136–145)
WBC # BLD AUTO: 4.71 THOUSAND/UL (ref 4.31–10.16)

## 2018-05-09 PROCEDURE — 84153 ASSAY OF PSA TOTAL: CPT | Performed by: INTERNAL MEDICINE

## 2018-05-09 PROCEDURE — 80053 COMPREHEN METABOLIC PANEL: CPT | Performed by: INTERNAL MEDICINE

## 2018-05-09 PROCEDURE — 85025 COMPLETE CBC W/AUTO DIFF WBC: CPT | Performed by: INTERNAL MEDICINE

## 2018-05-09 PROCEDURE — 36415 COLL VENOUS BLD VENIPUNCTURE: CPT | Performed by: INTERNAL MEDICINE

## 2018-05-09 PROCEDURE — 99214 OFFICE O/P EST MOD 30 MIN: CPT | Performed by: INTERNAL MEDICINE

## 2018-05-09 NOTE — PROGRESS NOTES
HEMATOLOGY / ONCOLOGY CLINIC NOTE    Primary Care Provider: Nurys Lyles MD  Referring Provider:    MRN: [id number]  : 1950    Reason for Encounter:    Chief Complaint   Patient presents with    Follow-up     patient is here for a 3 month follow up          History of Hematology / Oncology Illness:     Collette Kudo is a 80 y o  male who came in for follow up  Stage iv  , Castration naive metastatic prostate Cancer,  With right pelvic bone lesion  D/X: PSA doubled from 1 8 -->  3 6 within 9 month;  Bone scan positive for increased activity in right hemipelvis,   Suggesting metastatic lesion  No biopsy  Tr:     - Received [place] in 2018,  switch to Casodex plus Lupron managed by Urology  - on calcium vitamin-D has patient is on ADT  -  No indication for  Taxotere;  ? zytiga    Interval History:       3/20:   Patient came in for follow-up  Reported overall doing well  He is actively exercising, losing weight, no new bone pains,  No change of urination bowel movements  Taking casodex, Lupron, no major side effects  :  Today for follow-up for doing well  Decreased libido  Otherwise no new bone lesion bone pain  Body weight is stable  Able to   Keep up with work  Problem list:       Patient Active Problem List   Diagnosis    Sprain of right wrist    Swelling of joint, wrist, right    Rising PSA level    Prostate cancer ([address] )    Neoplasm of prostate, 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 ([address] )    Glaucoma    Benign essential hypertension    Colon cancer screening    Occult blood positive stool    Type 2 diabetes mellitus without complication ([address] )       Assessment / Plan:     1  Prostate cancer ([address] )  -    Castration sensitive  -    Continue Casodex Lupron  managed by Urology   -      No indication for Taxotere,  We discussed about Zytiga    However patient did not do blood work, we will proceed with lab today and then discussed with patient over the phone again   -   Based on NCCN guideline, patient will need blood work CBC, CMP, PSA every 3 months  -   Bone scan every 6 months, next one in April, then in October ( Ordered by Urology)     plan  -     Check labs  Will then discuss about Zytiga  -       If decided not to take Zytiga,   Will check labs /   PSA level  every 3 months  Bone scan every 6 months  -       I will follow patient every 6 months, alternate with Urology  I will see patient in December                25    minutes were spent on this visit  All questions answered to satisfaction; Advised pt to call if there is any further questions  Addendum : PSA < 0 1  Good response to therapy  Already reported severe decreased libido  Discussed with pt regarding the risk and benefit of Zytiga again, decided to forgo zytiga and continue current therapy  PHYSICIAL EXAMINATION:     Vital Signs:   [unfilled]  Body mass index is 29 34 kg/m²  Body surface area is 2 11 meters squared  GEN: Alert, awake oriented x3, in no acute distress  HEENT- No pallor, icterus, cyanosis, no oral mucosal lesions,   LAD - no palpable cervical, clavicle, axillary, inguinal LAD  Heart- normal S1 S2, regular rate and rhythm, No murmur, rubs  Lungs- clear breathing sound bilateral    Abdomen- soft, Non tender, bowel sounds present  Extremities- No cyanosis, clubbing, edema  Neuro- No focal neurological deficit           PAST MEDICAL HISTORY:   has a past medical history of radiation therapy; Malignant neoplasm of prostate ([address] ); and Shingles (02/22/2018)  PAST SURGICAL HISTORY:   has a past surgical history that includes Colonoscopy      CURRENT MEDICATIONS:     Current Outpatient Prescriptions   Medication Sig Dispense Refill    amLODIPine (NORVASC) 10 mg tablet Take 1 tablet (10 mg total) by mouth daily 90 tablet 1    bicalutamide (CASODEX) 50 mg tablet Take 1 tablet (50 mg total) by mouth daily 30 tablet 1    Coenzyme Q10 (COQ10) 100 MG CAPS Take by mouth      latanoprost (XALATAN) 0 005 % ophthalmic solution INSTILL 1 DROP INTO BOTH EYES AT BEDTIME  FILL ON 01-09-18  0    MEGARED OMEGA-3 KRILL  MG CAPS Take by mouth      metFORMIN (GLUCOPHAGE) 850 mg tablet Take 1 tablet by mouth 2 (two) times a day      multivitamin (THERAGRAN) TABS Take 1 tablet by mouth daily      Saw Palmetto, Serenoa repens, 450 MG CAPS Take by mouth      Na Sulfate-K Sulfate-Mg Sulf 17 5-3 13-1 6 DA/007KT SOLN Take 1 applicator by mouth once for 1 dose 1 Bottle 0     No current facility-administered medications for this visit  [unfilled]    SOCIAL HISTORY:   reports that he quit smoking about 49 years ago  He has never used smokeless tobacco  He reports that he does not drink alcohol or use drugs  FAMILY HISTORY:  family history includes Diabetes in his father, mother, and sister; Heart disease in his father; Hypertension in his father, mother, and sister; Multiple myeloma in his brother; Prostate cancer in his father  ALLERGIES:  is allergic to lactate  REVIEW OF SYSTEMS:  Please note that a 14-point review of systems was performed to include Constitutional, HEENT, Respiratory, CVS, GI, , Musculoskeletal, Integumentary, Neurologic, Rheumatologic, Endocrinologic, Psychiatric, Lymphatic, and Hematologic/Oncologic systems were reviewed and are negative unless otherwise stated in HPI  Positive and negative findings pertinent to this evaluation are incorporated into the history of present illness              LAB:    Lab Results   Component Value Date    WBC 5 30 02/15/2018    HGB 14 0 02/15/2018    HCT 42 7 02/15/2018    MCV 76 (L) 02/15/2018     02/15/2018       Lab Results   Component Value Date     02/15/2018    K 4 7 02/15/2018     02/15/2018    CO2 31 02/15/2018    ANIONGAP 4 02/15/2018    BUN 17 02/15/2018    CREATININE 0 83 02/15/2018    GLUCOSE 109 02/06/2018    GLUF 113 (H) 02/15/2018    CALCIUM 9 1 02/15/2018    AST 19 02/15/2018    ALT 21 02/15/2018    ALKPHOS 104 02/15/2018    PROT 7 8 02/15/2018    BILITOT 0 39 02/15/2018    EGFR 105 02/15/2018       IMAGING:    No orders to display     Dxa Bone Density Spine Hip And Pelvis    Result Date: 3/19/2018  Narrative: CENTRAL  DXA SCAN CLINICAL HISTORY:   80year old -American male risk factors include previous smoking  History of prostate carcinoma  Non-insulin-dependent diabetes  Osteoporosis screening  TECHNIQUE: Bone densitometry was performed using a Horizon A bone densitometer  Regions of interest appear properly placed  There are no obvious fractures or other confounding variables which could limit the study  Degenerative changes of the lumbar spine and hip  This will falsely elevate the bone mineral densities in these regions  COMPARISON:  None  RESULTS: LUMBAR SPINE:  L1-L4: BMD 1 269 gm/cm2 T-score 1 6 Z-score 1 5 LUMBAR SPINE L2-L4 (average) : BMD 1 325 gm/sq-cm            T-score is 1 9            Z-score is 1 8 LEFT TOTAL HIP: BMD 1 175 gm/cm2 T-score 0 9 Z-score 0 9 LEFT FEMORAL NECK: BMD 0 965 gm/cm2 T-score 0 3 Z-score 0 7     Impression: 1  Based on the [facility] classification, this study is normal and the patient is considered at low risk for fracture  2  A daily intake of calcium of at least 1200 mg and vitamin D, 800-1000 IU, as well as weight bearing and muscle strengthening exercise, fall prevention and avoidance of tobacco and excessive alcohol intake as basic preventive measures are recommended  3  Repeat DXA scan on the same equipment in 18-24 months as clinically indicated  The 10 year risk of hip fracture is 0 1%, with the 10 year risk of major osteoporotic fracture being 1 6%, as calculated by the [facility] fracture risk assessment tool (FRAX)  The current NOF guidelines recommend treating patients with FRAX 10 year risk score  of >3% for hip fracture and >20% for major osteoporotic fracture   WHO CLASSIFICATION: Normal (a T-score of -1 0 or higher) Low bone mineral density (a T-score of less than -1 0 but higher than -2 5) Osteoporosis (a T-score of -2 5 or less) Severe osteoporosis (a T-score of -2 5 or less with a fragility fracture)   Workstation performed: [id number]

## 2018-05-15 DIAGNOSIS — R97.20 ELEVATED PROSTATE SPECIFIC ANTIGEN (PSA): ICD-10-CM

## 2018-05-24 ENCOUNTER — ANESTHESIA EVENT (OUTPATIENT)
Dept: PERIOP | Facility: AMBULARY SURGERY CENTER | Age: 68
End: 2018-05-24
Payer: MEDICARE

## 2018-06-05 ENCOUNTER — ANESTHESIA (OUTPATIENT)
Dept: PERIOP | Facility: AMBULARY SURGERY CENTER | Age: 68
End: 2018-06-05
Payer: MEDICARE

## 2018-06-24 NOTE — ANESTHESIA PREPROCEDURE EVALUATION
Review of Systems/Medical History          Cardiovascular  Hypertension ,    Pulmonary  Not a smoker ,        GI/Hepatic       Prostatic disorder (Radiation Rx ), history of prostate cancer       Endo/Other  Diabetes ,      GYN       Hematology   Musculoskeletal       Neurology   Psychology           Physical Exam    Airway    Mallampati score: I  TM Distance: >3 FB  Neck ROM: full     Dental   No notable dental hx     Cardiovascular      Pulmonary      Other Findings        Anesthesia Plan  ASA Score- 2     Anesthesia Type- IV sedation with anesthesia with ASA Monitors  Additional Monitors:   Airway Plan:         Plan Factors-Patient not instructed to abstain from smoking on day of procedure  Patient did not smoke on day of surgery  Induction- intravenous  Postoperative Plan-     Informed Consent- Anesthetic plan and risks discussed with patient and spouse  I personally reviewed this patient with the CRNA  Discussed and agreed on the Anesthesia Plan with the CRNA  eWndie Smith

## 2018-06-25 ENCOUNTER — HOSPITAL ENCOUNTER (OUTPATIENT)
Facility: AMBULARY SURGERY CENTER | Age: 68
Setting detail: OUTPATIENT SURGERY
Discharge: HOME | End: 2018-06-25
Attending: INTERNAL MEDICINE | Admitting: INTERNAL MEDICINE
Payer: MEDICARE

## 2018-06-25 VITALS
RESPIRATION RATE: 18 BRPM | HEIGHT: 70 IN | BODY MASS INDEX: 28.35 KG/M2 | DIASTOLIC BLOOD PRESSURE: 65 MMHG | TEMPERATURE: 97.2 F | SYSTOLIC BLOOD PRESSURE: 120 MMHG | HEART RATE: 59 BPM | WEIGHT: 198 LBS | OXYGEN SATURATION: 96 %

## 2018-06-25 DIAGNOSIS — R19.5 OCCULT BLOOD POSITIVE STOOL: ICD-10-CM

## 2018-06-25 PROCEDURE — 82948 REAGENT STRIP/BLOOD GLUCOSE: CPT

## 2018-06-25 PROCEDURE — 45380 COLONOSCOPY AND BIOPSY: CPT | Performed by: INTERNAL MEDICINE

## 2018-06-25 PROCEDURE — 88305 TISSUE EXAM BY PATHOLOGIST: CPT | Performed by: PATHOLOGY

## 2018-06-25 RX ORDER — SIMETHICONE 20 MG/.3ML
EMULSION ORAL AS NEEDED
Status: DISCONTINUED | OUTPATIENT
Start: 2018-06-25 | End: 2018-06-25 | Stop reason: HOSPADM

## 2018-06-25 RX ORDER — SODIUM CHLORIDE 9 MG/ML
125 INJECTION, SOLUTION INTRAVENOUS CONTINUOUS
Status: DISCONTINUED | OUTPATIENT
Start: 2018-06-25 | End: 2018-06-25 | Stop reason: HOSPADM

## 2018-06-25 RX ORDER — LIDOCAINE HYDROCHLORIDE 10 MG/ML
INJECTION, SOLUTION INFILTRATION; PERINEURAL AS NEEDED
Status: DISCONTINUED | OUTPATIENT
Start: 2018-06-25 | End: 2018-06-25 | Stop reason: SURG

## 2018-06-25 RX ORDER — PROPOFOL 10 MG/ML
INJECTION, EMULSION INTRAVENOUS AS NEEDED
Status: DISCONTINUED | OUTPATIENT
Start: 2018-06-25 | End: 2018-06-25 | Stop reason: SURG

## 2018-06-25 RX ADMIN — SODIUM CHLORIDE 125 ML/HR: 0.9 INJECTION, SOLUTION INTRAVENOUS at 07:59

## 2018-06-25 RX ADMIN — PROPOFOL 200 MG: 10 INJECTION, EMULSION INTRAVENOUS at 08:40

## 2018-06-25 RX ADMIN — LIDOCAINE HYDROCHLORIDE 30 MG: 10 INJECTION, SOLUTION INFILTRATION; PERINEURAL at 08:40

## 2018-06-25 RX ADMIN — SODIUM CHLORIDE: 0.9 INJECTION, SOLUTION INTRAVENOUS at 08:30

## 2018-06-25 NOTE — H&P
History and Physical -  [facility]  Jose Vázquez 80 y o  male MRN: [id number]    HPI: Jose Vázquez is a 80y o  year old male who presents for colon cancer screening  Review of Systems    Historical Information   Past Medical History:   Diagnosis Date    [facility]     Hx of radiation therapy     Hypertension     [facility]     Shingles 02/22/2018     Past Surgical History:   Procedure Laterality Date    COLONOSCOPY      9 years ago     Social History   History   Alcohol Use No     Comment: socially     History   Drug Use No     History   Smoking Status    Former Smoker    Quit date: 1969   Smokeless Tobacco    Never Used     Family History   Problem Relation Age of Onset    Diabetes Mother     Hypertension Mother     Heart disease Father         cardiac disorder    Diabetes Father     Hypertension Father     Prostate cancer Father     Diabetes Sister     Hypertension Sister     Multiple myeloma Brother        Meds/Allergies     Prescriptions Prior to Admission   Medication    amLODIPine (NORVASC) 10 mg tablet    Coenzyme Q10 (COQ10) 100 MG CAPS    latanoprost (XALATAN) 0 005 % ophthalmic solution    metFORMIN (GLUCOPHAGE) 850 mg tablet    MEGARED OMEGA-3 KRILL  MG CAPS    multivitamin (THERAGRAN) TABS    Na Sulfate-K Sulfate-Mg Sulf 17 5-3 13-1 6 GM/180ML SOLN    Saw Palmetto, Serenoa repens, 450 MG CAPS       Allergies   Allergen Reactions    Lactate Diarrhea       Objective     Blood pressure 140/76, pulse 67, temperature (!) 96 9 °F (36 1 °C), temperature source Temporal, resp  rate 20, height 5' 10" (1 778 m), weight 89 8 kg (198 lb), SpO2 99 %  PHYSICAL EXAM    Gen: NAD  CV: RRR  CHEST: Clear  ABD: soft, NT/ND  EXT: no edema  Neuro: AAO      ASSESSMENT/PLAN:  This is a 80y o  year old male here for colon cancer screening  PLAN:   Procedure:   Colonoscopy  Procedure:

## 2018-06-25 NOTE — DISCHARGE INSTRUCTIONS
Colorectal Polyps   WHAT YOU NEED TO KNOW:   Colorectal polyps are small growths of tissue in the lining of the colon and rectum  Most polyps are hyperplastic polyps and are usually benign (noncancerous)  Certain types of polyps, called adenomatous polyps, may turn into cancer  DISCHARGE INSTRUCTIONS:   Follow up with your healthcare provider or gastroenterologist as directed: You may need to return for more tests, such as another colonoscopy  Write down your questions so you remember to ask them during your visits  Reduce your risk for colorectal polyps:   · Eat a variety of healthy foods:  Healthy foods include fruit, vegetables, whole-grain breads, low-fat dairy products, beans, lean meat, and fish  Ask if you need to be on a special diet  · Maintain a healthy weight:  Ask your healthcare provider if you need to lose weight and how much you need to lose  Ask for help with a weight loss program     · Exercise:  Begin to exercise slowly and do more as you get stronger  Talk with your healthcare provider before you start an exercise program      · Limit alcohol:  Your risk for polyps increases the more you drink  · Do not smoke: If you smoke, it is never too late to quit  Ask for information about how to stop  For support and more information:   · Bob Morocho ([facility])  [address], [place] [address]  Phone: [phone]  Web Address: www digestive  niddk nih gov  Contact your healthcare provider or gastroenterologist if:   · You have a fever  · You have chills, a cough, or feel weak and achy  · You have abdominal pain that does not go away or gets worse after you take medicine  · Your abdomen is swollen  · You are losing weight without trying  · You have questions or concerns about your condition or care  Seek care immediately or call 911 if:   · You have sudden shortness of breath       · You have a fast heart rate, fast breathing, or are too dizzy to stand up  · You have severe abdominal pain  · You see blood in your bowel movement  © 2017 [facility] [facility] Information is for End User's use only and may not be sold, redistributed or otherwise used for commercial purposes  All illustrations and images included in CareNotes® are the copyrighted property of A D A M , Inc  or [employer]  The above information is an  only  It is not intended as medical advice for individual conditions or treatments  Talk to your doctor, nurse or pharmacist before following any medical regimen to see if it is safe and effective for you

## 2018-06-25 NOTE — OP NOTE
Colonoscopy Procedure Note    Procedure: Colonoscopy    Sedation: Monitored anesthesia care, check anesthesia records      ASA Class: 2    INDICATIONS: Screening for Colon Cancer    POST-OP DIAGNOSIS: See the impression below    Procedure Details     Prior colonoscopy: 10 years ago  Informed consent was obtained for the procedure, including sedation  Risks of perforation, hemorrhage, adverse drug reaction and aspiration were discussed  The patient was placed in the left lateral decubitus position  Based on the pre-procedure assessment, including review of the patient's medical history, medications, allergies, and review of systems, he had been deemed to be an appropriate candidate for conscious sedation; he was therefore sedated with the medications listed below  The patient was monitored continuously with telemetry, pulse oximetry, blood pressure monitoring, and direct observations  A rectal examination was performed  The variable-stiffness pediatric colonoscope was inserted into the rectum and advanced under direct vision to the cecum, which was identified by the ileocecal valve and appendiceal orifice  The quality of the colonic preparation was good  A careful inspection was made as the colonoscope was withdrawn, including a retroflexed view of the rectum; findings and interventions are described below  Findings:  1   2-3 mm flat polyp noted in the ascending colon, removed using cold biopsy forceps  2   Diminutive polyp noted in the descending colon, removed using cold biopsy forceps  3   Mild sigmoid diverticulosis  4   Retroflexed view revealed small internal hemorrhoids  Complications:  None; patient tolerated the procedure well  Impression:    1  Two colon polyps  2   Mild sigmoid diverticulosis  3   Small internal hemorrhoids  Recommendations:  Repeat colonoscopy in 5 years if polyps are adenomas  High-fiber diet  Follow-up biopsy results in 2-3 weeks

## 2018-06-25 NOTE — ANESTHESIA POSTPROCEDURE EVALUATION
Post-Op Assessment Note      CV Status:  Stable    Mental Status:  Alert    Hydration Status:  Stable    PONV Controlled:  None    Airway Patency:  Patent    Post Op Vitals Reviewed: Yes          Staff: CRNA, Anesthesiologist           /62 (06/25/18 0909)    Temp (!) 97 2 °F (36 2 °C) (06/25/18 0909)    Pulse 67 (06/25/18 0909)   Resp 18 (06/25/18 0909)    SpO2 97 % (06/25/18 0909)

## 2018-06-26 LAB — GLUCOSE SERPL-MCNC: 115 MG/DL (ref 65–140)

## 2018-08-01 ENCOUNTER — APPOINTMENT (OUTPATIENT)
Dept: LAB | Facility: CLINIC | Age: 68
End: 2018-08-01
Payer: MEDICARE

## 2018-08-01 DIAGNOSIS — E11.9 TYPE 2 DIABETES MELLITUS WITHOUT COMPLICATION, WITHOUT LONG-TERM CURRENT USE OF INSULIN (HCC): ICD-10-CM

## 2018-08-01 DIAGNOSIS — R19.5 OCCULT BLOOD POSITIVE STOOL: ICD-10-CM

## 2018-08-01 DIAGNOSIS — I10 ESSENTIAL HYPERTENSION: ICD-10-CM

## 2018-08-01 DIAGNOSIS — I10 BENIGN ESSENTIAL HYPERTENSION: ICD-10-CM

## 2018-08-01 LAB
CHOLEST SERPL-MCNC: 192 MG/DL (ref 50–200)
EST. AVERAGE GLUCOSE BLD GHB EST-MCNC: 137 MG/DL
HBA1C MFR BLD: 6.4 % (ref 4.2–6.3)
HDLC SERPL-MCNC: 92 MG/DL (ref 40–60)
LDLC SERPL CALC-MCNC: 87 MG/DL (ref 0–100)
NONHDLC SERPL-MCNC: 100 MG/DL
TRIGL SERPL-MCNC: 64 MG/DL
TSH SERPL DL<=0.05 MIU/L-ACNC: 0.66 UIU/ML (ref 0.36–3.74)

## 2018-08-01 PROCEDURE — 83036 HEMOGLOBIN GLYCOSYLATED A1C: CPT

## 2018-08-01 PROCEDURE — 84443 ASSAY THYROID STIM HORMONE: CPT

## 2018-08-01 PROCEDURE — 80061 LIPID PANEL: CPT

## 2018-08-06 ENCOUNTER — OFFICE VISIT (OUTPATIENT)
Dept: FAMILY MEDICINE CLINIC | Facility: CLINIC | Age: 68
End: 2018-08-06
Payer: MEDICARE

## 2018-08-06 VITALS
WEIGHT: 203 LBS | DIASTOLIC BLOOD PRESSURE: 78 MMHG | HEIGHT: 70 IN | HEART RATE: 76 BPM | OXYGEN SATURATION: 99 % | SYSTOLIC BLOOD PRESSURE: 128 MMHG | RESPIRATION RATE: 16 BRPM | BODY MASS INDEX: 29.06 KG/M2

## 2018-08-06 DIAGNOSIS — E11.9 TYPE 2 DIABETES MELLITUS WITHOUT COMPLICATION, WITHOUT LONG-TERM CURRENT USE OF INSULIN (HCC): Primary | ICD-10-CM

## 2018-08-06 DIAGNOSIS — I10 BENIGN ESSENTIAL HYPERTENSION: ICD-10-CM

## 2018-08-06 PROBLEM — R19.5 OCCULT BLOOD POSITIVE STOOL: Status: RESOLVED | Noted: 2018-03-27 | Resolved: 2018-08-06

## 2018-08-06 PROBLEM — S63.501A SPRAIN OF RIGHT WRIST: Status: RESOLVED | Noted: 2017-03-30 | Resolved: 2018-08-06

## 2018-08-06 PROCEDURE — 3074F SYST BP LT 130 MM HG: CPT | Performed by: FAMILY MEDICINE

## 2018-08-06 PROCEDURE — 99214 OFFICE O/P EST MOD 30 MIN: CPT | Performed by: FAMILY MEDICINE

## 2018-08-06 PROCEDURE — 3078F DIAST BP <80 MM HG: CPT | Performed by: FAMILY MEDICINE

## 2018-08-06 NOTE — ASSESSMENT & PLAN NOTE
Lab Results   Component Value Date    HGBA1C 6 4 (H) 08/01/2018    diabetes well controlled  Continue diabetic diet  And will check blood sugar once a day if needed    No results for input(s): POCGLU in the last 72 hours      Blood Sugar Average: Last 72 hrs:

## 2018-08-06 NOTE — PROGRESS NOTES
Assessment/Plan:    Problem List Items Addressed This Visit        Endocrine    Type 2 diabetes mellitus without complication ([facility] [address] ) - Primary     Lab Results   Component Value Date    HGBA1C 6 4 (H) 08/01/2018    diabetes well controlled  Continue diabetic diet  And will check blood sugar once a day if needed    No results for input(s): POCGLU in the last 72 hours  Blood Sugar Average: Last 72 hrs:           Relevant Medications    metFORMIN (GLUCOPHAGE) 850 mg tablet    Other Relevant Orders    Glucometer test strips    Glucometer    Lancets    CBC and differential    Comprehensive metabolic panel    Hemoglobin A1C    Lipid panel       Cardiovascular and Mediastinum    Benign essential hypertension      Hypertension stable continue amlodipine  And lipid panel is normal               Chief Complaint   Patient presents with    Follow-up       Subjective:   Patient ID: Nettie Dominguez is a 80 y o  male  He is here follow-up on diabetes, he is taking metformin 850 mg twice a day and he says he needs a glucometer and test strips so he can check his blood sugar  He denies any headache chest pain shortness of breath, he has prostate cancer stage IV and follows oncologist in [facility]  PSA has been stable and he only complains of polyuria he drinks more water  he needs refill on metformin  For blood pressure he takes amlodipine and his blood pressure has been stable,    Patient's shoes and socks removed  Right Foot/Ankle   Right Foot Inspection  Skin Exam: skin normal skin not intact, no dry skin, no warmth, no callus, no erythema, no maceration, no abnormal color, no pre-ulcer, no ulcer and no callus                          Toe Exam: ROM and strength within normal limits  Sensory   Vibration: intact  Proprioception: intact   Monofilament testing: intact  Vascular  Capillary refills: < 3 seconds  The right DP pulse is 2+       Left Foot/Ankle  Left Foot Inspection  Skin Exam: skin normalskin not intact, no dry skin, no warmth, no erythema, no maceration, normal color, no pre-ulcer, no ulcer and no callus                         Toe Exam: ROM and strength within normal limits                   Sensory   Vibration: intact  Proprioception: intact  Monofilament: intact  Vascular  Capillary refills: < 3 seconds  The left DP pulse is 2+  Assign Risk Category:  ; ;        Risk: 0      Review of Systems   Constitutional: Negative for activity change, appetite change, chills, diaphoresis, fatigue, fever and unexpected weight change  HENT: Negative for congestion, dental problem, ear discharge, ear pain, facial swelling, hearing loss, mouth sores, nosebleeds, postnasal drip, rhinorrhea, sinus pain, sinus pressure, sneezing, sore throat, trouble swallowing and voice change  Eyes: Negative for photophobia, pain, discharge, redness and itching  Respiratory: Negative for cough, chest tightness, shortness of breath and wheezing  Cardiovascular: Negative for chest pain, palpitations and leg swelling  Gastrointestinal: Negative for abdominal distention, abdominal pain, blood in stool, constipation, diarrhea and nausea  Endocrine: Positive for polyuria  Negative for cold intolerance, heat intolerance, polydipsia and polyphagia  Genitourinary: Negative for dysuria, flank pain, frequency, hematuria and urgency  Musculoskeletal: Negative for arthralgias, back pain, myalgias and neck pain  Skin: Negative for color change and pallor  Allergic/Immunologic: Negative for environmental allergies and food allergies  Neurological: Negative for dizziness, tremors, seizures, speech difficulty, weakness, light-headedness, numbness and headaches  Hematological: Negative for adenopathy  Does not bruise/bleed easily  Psychiatric/Behavioral: Negative for behavioral problems, confusion, sleep disturbance and suicidal ideas  The patient is not nervous/anxious           Objective:  Physical Exam   Constitutional: He is oriented to person, place, and time  He appears well-developed and well-nourished  HENT:   Head: Normocephalic and atraumatic  Nose: Nose normal    Mouth/Throat: Oropharynx is clear and moist  No oropharyngeal exudate  Eyes: Conjunctivae and EOM are normal  Pupils are equal, round, and reactive to light  Right eye exhibits no discharge  Left eye exhibits no discharge  No scleral icterus  Neck: Normal range of motion  Neck supple  No tracheal deviation present  No thyromegaly present  Cardiovascular: Normal rate, regular rhythm and normal heart sounds  No murmur heard  Pulses:       Dorsalis pedis pulses are 2+ on the right side, and 2+ on the left side  Pulmonary/Chest: Effort normal and breath sounds normal  No respiratory distress  He has no wheezes  He has no rales  Abdominal: Soft  Bowel sounds are normal  He exhibits no distension and no mass  There is no tenderness  There is no rebound  Musculoskeletal: Normal range of motion  He exhibits no edema  Feet:   Right Foot:   Skin Integrity: Negative for ulcer, skin breakdown, erythema, warmth, callus or dry skin  Left Foot:   Skin Integrity: Negative for ulcer, skin breakdown, erythema, warmth, callus or dry skin  Lymphadenopathy:     He has no cervical adenopathy  Neurological: He is alert and oriented to person, place, and time  He has normal reflexes  No cranial nerve deficit  Skin: Skin is warm  No rash noted  No erythema  No pallor  Psychiatric: He has a normal mood and affect  His behavior is normal  Judgment and thought content normal    Nursing note and vitals reviewed  Past Surgical History:   Procedure Laterality Date    COLONOSCOPY      9 years ago    [place] COLONOSCOPY FLX DX W/COLLJ SPEC WHEN PFRMD N/A 6/25/2018    Procedure: COLONOSCOPY;  Surgeon: Natalie Braxton MD;  [facility];   Service: Gastroenterology       Family History   Problem Relation Age of Onset    Diabetes Mother     Hypertension Mother     Heart disease Father         cardiac disorder    Diabetes Father     Hypertension Father     Prostate cancer Father     Diabetes Sister     Hypertension Sister     Multiple myeloma Brother          Current Outpatient Prescriptions:     amLODIPine (NORVASC) 10 mg tablet, Take 1 tablet (10 mg total) by mouth daily, Disp: 90 tablet, Rfl: 1    Coenzyme Q10 (COQ10) 100 MG CAPS, Take by mouth, Disp: , Rfl:     latanoprost (XALATAN) 0 005 % ophthalmic solution, INSTILL 1 DROP INTO BOTH EYES AT BEDTIME   FILL ON 01-09-18, Disp: , Rfl: 0    MEGARED OMEGA-3 KRILL  MG CAPS, Take by mouth, Disp: , Rfl:     metFORMIN (GLUCOPHAGE) 850 mg tablet, Take 1 tablet (850 mg total) by mouth 2 (two) times a day, Disp: 180 tablet, Rfl: 1    multivitamin (THERAGRAN) TABS, Take 1 tablet by mouth daily, Disp: , Rfl:     Na Sulfate-K Sulfate-Mg Sulf 17 5-3 13-1 6 GM/180ML SOLN, Take 1 applicator by mouth once for 1 dose, Disp: 1 Bottle, Rfl: 0    Saw Palmetto, Serenoa repens, 450 MG CAPS, Take by mouth, Disp: , Rfl:     Allergies   Allergen Reactions    Lactate Diarrhea       Vitals:    08/06/18 0757   BP: 128/78   Pulse: 76   Resp: 16   SpO2: 99%   Weight: 92 1 kg (203 lb)   Height: 5' 10" (1 778 m)

## 2018-09-18 DIAGNOSIS — I10 ESSENTIAL HYPERTENSION: ICD-10-CM

## 2018-09-18 RX ORDER — AMLODIPINE BESYLATE 10 MG/1
10 TABLET ORAL DAILY
Qty: 90 TABLET | Refills: 1 | Status: SHIPPED | OUTPATIENT
Start: 2018-09-18 | End: 2019-03-12 | Stop reason: SDUPTHER

## 2018-09-25 NOTE — PROGRESS NOTES
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 [facility])     Patient currently on androgen deprivation therapy, PSA originally decreased to 0 1, most recently 0 3, still relatively good control, ECOG performance status of 0 indicating no detriment to his functioning  In excellent spirits, no detriment from hormone deprivation therapy at this time  Plan:  Due for Lupron today, he will follow up in 6 months for repeat Lupron injection, we will check his PSA again in 6 months time  He does have a visit with the medical oncologist going forward, and should his PSA increase again he may benefit from other treatments for castrate resistant prostate cancer in that setting  Relevant Medications    leuprolide (LUPRON DEPOT 6 MONTH KIT) IM injection kit 45 mg          Discussion:  I am happy to see the patient doing as well as he is, PSA has slightly increased from the last value, if this continues to increase 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 with need for potential future systemic therapy  Assessment and plan:       Please see problem oriented charting for the assessment plan of today's urological complaints    Vickie Castaneda MD      Chief Complaint     Chief Complaint   Patient presents with   Rande Halt    Prostate Cancer    Of note, this patient has prostate cancer and as such the prostate cancer screening chief complaint is incorrect      History of Present Illness     Lisa Richard is a 80 y o  gentleman with a history of prostate cancer, 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    He is doing weight training and is quite active with a good performance status of ECOG 0 indicating no detriment  His spirits are quite good today, he has no new voiding complaints  He does continue have nocturia 3-4 times per night, this does not bother him much  No dysuria, incontinence, hesitancy, gross hematuria, urgency, stream quality is good and he usually feels empty after voiding but not always  The following portions of the patient's history were reviewed and updated as appropriate: allergies, current medications, past family history, past medical history, past social history, past surgical history and problem list     Detailed Urologic History     - please refer to HPI    Review of Systems     Review of Systems   Constitutional: Negative  HENT: Negative  Eyes: Negative  Respiratory: Negative  Cardiovascular: Negative  Gastrointestinal: Negative  Endocrine: Negative  Genitourinary:        Nocturia   Musculoskeletal: Negative  Skin: Negative  Allergic/Immunologic: Negative  Neurological: Negative  Hematological: Negative  Psychiatric/Behavioral: Negative  Allergies     Allergies   Allergen Reactions    Lactate Diarrhea       Physical Exam     Physical Exam   Constitutional: He is oriented to person, place, and time  He appears well-developed and well-nourished  No distress  HENT:   Head: Normocephalic and atraumatic  Right Ear: External ear normal    Left Ear: External ear normal    Nose: Nose normal    Eyes: Conjunctivae and EOM are normal  Pupils are equal, round, and reactive to light  Right eye exhibits no discharge  Left eye exhibits no discharge  No scleral icterus  Neck: Normal range of motion  Neck supple  No tracheal deviation present  No thyromegaly present  Cardiovascular: Regular rhythm and intact distal pulses  Pulmonary/Chest: Effort normal  No stridor  No respiratory distress  He has no wheezes  Abdominal: Soft  He exhibits no distension and no mass  There is no tenderness  There is no rebound and no guarding  No hernia  Genitourinary:   Genitourinary Comments: No suprapubic tenderness   Musculoskeletal: Normal range of motion  He exhibits no edema, tenderness or deformity  Lymphadenopathy:     He has no cervical adenopathy  Neurological: He is alert and oriented to person, place, and time  No cranial nerve deficit  Coordination normal    Skin: Skin is warm and dry  No rash noted  He is not diaphoretic  No erythema  No pallor  Psychiatric: He has a normal mood and affect  His behavior is normal  Judgment and thought content normal    Nursing note and vitals reviewed  Vital Signs  Vitals:    09/26/18 1238   BP: 144/80   Pulse: 80   Weight: 90 8 kg (200 lb 3 2 oz)   Height: 5' 9" (1 753 m)         Current Medications       Current Outpatient Prescriptions:     amLODIPine (NORVASC) 10 mg tablet, TAKE 1 TABLET (10 MG TOTAL) BY MOUTH DAILY, Disp: 90 tablet, Rfl: 1    Coenzyme Q10 (COQ10) 100 MG CAPS, Take by mouth, Disp: , Rfl:     latanoprost (XALATAN) 0 005 % ophthalmic solution, INSTILL 1 DROP INTO BOTH EYES AT BEDTIME   FILL ON 01-09-18, Disp: , Rfl: 0    MEGARED OMEGA-3 KRILL  MG CAPS, Take by mouth, Disp: , Rfl:     metFORMIN (GLUCOPHAGE) 850 mg tablet, Take 1 tablet (850 mg total) by mouth 2 (two) times a day, Disp: 180 tablet, Rfl: 1    multivitamin (THERAGRAN) TABS, Take 1 tablet by mouth daily, Disp: , Rfl:     Saw Palmetto, Serenoa repens, 450 MG CAPS, Take by mouth, Disp: , Rfl:     Na Sulfate-K Sulfate-Mg Sulf 17 5-3 13-1 6 GM/180ML SOLN, Take 1 applicator by mouth once for 1 dose, Disp: 1 Bottle, Rfl: 0    Current Facility-Administered Medications:     leuprolide (LUPRON DEPOT 6 MONTH KIT) IM injection kit 45 mg, 45 mg, Intramuscular, Once, Jes Contreras MD      Active Problems     Patient Active Problem List   Diagnosis    Swelling of joint, wrist, right    Rising PSA level    Prostate cancer ([facility] [address]    Neoplasm of prostate, 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 ([facility] )    Glaucoma    Benign essential hypertension    Colon cancer screening    Type 2 diabetes mellitus without complication [facility])         Past Medical History     Past Medical History:   Diagnosis Date    Diabetes mellitus ([facility] )     Hx of radiation therapy     Hypertension     Malignant neoplasm of prostate ([facility] )     Shingles 02/22/2018         Surgical History     Past Surgical History:   Procedure Laterality Date    COLONOSCOPY      9 years ago    [place] COLONOSCOPY FLX DX W/COLLJ SPEC WHEN PFRMD N/A 6/25/2018    Procedure: COLONOSCOPY;  Surgeon: Mayco Magana MD;  Location: [facility];   Service: Gastroenterology         Family History     Family History   Problem Relation Age of Onset    Diabetes Mother     Hypertension Mother     Heart disease Father         cardiac disorder    Diabetes Father     Hypertension Father     Prostate cancer Father     Diabetes Sister     Hypertension Sister     Multiple myeloma Brother          Social History     Social History     History   Smoking Status    Former Smoker    Quit date: 1969   Smokeless Tobacco    Never Used         Pertinent Lab Values     Lab Results   Component Value Date    CREATININE 0 86 08/01/2018       Lab Results   Component Value Date    PSA 0 3 08/01/2018    PSA <0 1 05/09/2018    PSA 4 3 (H) 02/06/2018             Pertinent Imaging      There is no pertinent urological imaging for my review

## 2018-09-25 NOTE — PATIENT INSTRUCTIONS
Prostate Cancer   WHAT YOU NEED TO KNOW:   What do I need to know about prostate cancer? The prostate is the male sex gland that helps make semen  It is about the size of a walnut and wraps around the urethra  The urethra is the tube that carries urine from the bladder to the end of the penis  In most cases, prostate cancer is slow growing  What increases my risk for prostate cancer? · Age older than 48 years    · Father or brother with prostate cancer    · Regularly eating fried or high-fat foods    · Eating few fruits and vegetables    · Exposure to high amounts of certain chemicals, such as in cigarette smoke or alkaline batteries    · A sexually transmitted infection (STI)  What are the signs and symptoms of prostate cancer? You may have no symptoms during the early stages  In the later stages, you may have any of the following:  · Trouble starting or stopping the flow of urine    · Feeling the need to urinate often, especially at night    · Pain or a burning feeling when you urinate or ejaculate semen    · Trouble having an erection    · Blood in your urine or semen    · Not being able to urinate at all    · Pain or stiffness in your lower back, hips, or upper thighs  How is prostate cancer diagnosed? · Digital rectal examination (ZOILA)  is a test to check the size and shape of your prostate  Your healthcare provider will insert a gloved finger into your rectum to feel if your prostate is large, firm, or has lumps  · Prostate-specific antigen (PSA)  is a blood test to check PSA levels  These levels may be increased if you have prostate cancer  · A biopsy  is used to take a sample of your prostate gland to be tested for cancer  The sample may also help healthcare providers determine the stage of your cancer  How is prostate cancer treated? If you have early stage cancer, your healthcare provider may recommend that you have frequent tests and regular follow-up visits to watch for changes   You may also need any of the following:  · Hormone therapy  is medicine used to decrease testosterone (male hormone) levels  · Radiation therapy  is used to kill cancer cells with high-energy x-ray beams  You may receive radiation therapy from outside your body or from small beads or rods placed inside your prostate  · Surgery  may be needed, depending on the stage of the cancer  Part or all of your prostate may be removed  You may also need to have some lymph nodes taken out  This may help keep the cancer from spreading to other parts of your body  What can I do to manage my prostate cancer? · Do not smoke  Nicotine can damage blood vessels and make it more difficult to manage your prostate cancer  Smoking also increases your risk for new or returning cancer and delays healing after treatment  Do not use e-cigarettes or smokeless tobacco in place of cigarettes or to help you quit  They still contain nicotine  Ask your healthcare provider for information if you currently smoke and need help quitting  · Limit or do not drink alcohol as directed  Limit alcohol to 2 drinks per day  A drink is 12 ounces of beer, 1½ ounces of liquor, or 5 ounces of wine  · Eat a variety of healthy foods  Healthy foods include fruits, vegetables, whole-grain breads, low-fat dairy products, beans, lean meats, and fish  Your healthcare provider may also recommend changes to the amounts of calcium and vitamin D you have each day  · Manage your weight  Obesity may increase your risk for problems from prostate cancer  Limit or do not have high-calorie foods or drinks  · Exercise as directed  Exercise may help you recover after treatment and may help prevent your prostate cancer from returning  Exercise can also help you manage your weight  Try to get at least 30 minutes of exercise 5 days a week, such as walking  · Ask about sexual activity    Ask your healthcare provider when it is safe for you to start having sex after your treatment  Medicines may be given if you have trouble getting or maintaining an erection  · Manage incontinence  You may have incontinence (trouble controlling when you urinate) after treatment  Ask your healthcare provider for information on managing urinary incontinence  You may be able to gain control over your urination with techniques or medicines  · Drink liquids as directed  Ask how much liquid to drink each day and which liquids are best for you  Drink extra liquids to prevent dehydration  You will also need to replace fluid if you are vomiting or have diarrhea from cancer treatments  Call 911 for any of the following:   · Your leg feels warm, tender, and painful  It may look swollen and red  · You suddenly feel lightheaded and short of breath  · You have chest pain when you take a deep breath or cough  · You cough up blood  When should I contact my healthcare provider? · You have a fever  · You feel you cannot cope with your illness  · You have blood in your urine or have trouble urinating  · You have pain that does not get better after you take your medicine  · You have questions or concerns about your condition or care  CARE AGREEMENT:   You have the right to help plan your care  Learn about your health condition and how it may be treated  Discuss treatment options with your caregivers to decide what care you want to receive  You always have the right to refuse treatment  The above information is an  only  It is not intended as medical advice for individual conditions or treatments  Talk to your doctor, nurse or pharmacist before following any medical regimen to see if it is safe and effective for you  © 2017 [address] [employer] Information is for End User's use only and may not be sold, redistributed or otherwise used for commercial purposes   All illustrations and images included in CareNotes® are the copyrighted property of [employer]  or [employer]

## 2018-09-26 ENCOUNTER — OFFICE VISIT (OUTPATIENT)
Dept: UROLOGY | Facility: CLINIC | Age: 68
End: 2018-09-26
Payer: MEDICARE

## 2018-09-26 VITALS
HEART RATE: 80 BPM | SYSTOLIC BLOOD PRESSURE: 144 MMHG | DIASTOLIC BLOOD PRESSURE: 80 MMHG | HEIGHT: 69 IN | WEIGHT: 200.2 LBS | BODY MASS INDEX: 29.65 KG/M2

## 2018-09-26 DIAGNOSIS — C61 NEOPLASM OF PROSTATE, DISTANT METASTASIS STAGING CATEGORY M1B: METASTASIS TO BONE (HCC): ICD-10-CM

## 2018-09-26 DIAGNOSIS — C61 PROSTATE CANCER (HCC): Primary | ICD-10-CM

## 2018-09-26 DIAGNOSIS — C79.51 NEOPLASM OF PROSTATE, DISTANT METASTASIS STAGING CATEGORY M1B: METASTASIS TO BONE (HCC): ICD-10-CM

## 2018-09-26 PROCEDURE — 99214 OFFICE O/P EST MOD 30 MIN: CPT | Performed by: UROLOGY

## 2018-09-26 NOTE — LETTER
September 27, 2018     Jessica Sheikh MD  [address] [facility]  [place] [address]    Patient: Leroy Perez   YOB: 1950   Date of Visit: 9/26/2018       Dear Dr [address]inaldo [address]:    Thank you for referring Leroy Perez to me for evaluation  Below are my notes for this consultation  If you have questions, please do not hesitate to call me  I look forward to following your patient along with you  Sincerely,        Chris Pena MD        CC: Leighton Cha MD PhD  Chris Pena MD  9/26/2018  1:08 PM  Sign at close encounter       Problem List Items Addressed This Visit     Prostate cancer [facility]) - Primary    Relevant Medications    leuprolide (LUPRON DEPOT 6 MONTH KIT) IM injection kit 45 mg    Other Relevant Orders    PSA Total, Diagnostic    Neoplasm of prostate, 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 [facility])     Patient currently on androgen deprivation therapy, PSA originally decreased to 0 1, most recently 0 3, still relatively good control, ECOG performance status of 0 indicating no detriment to his functioning  In excellent spirits, no detriment from hormone deprivation therapy at this time  Plan:  Due for Lupron today, he will follow up in 6 months for repeat Lupron injection, we will check his PSA again in 6 months time  He does have a visit with the medical oncologist going forward, and should his PSA increase again he may benefit from other treatments for castrate resistant prostate cancer in that setting  Relevant Medications    leuprolide (LUPRON DEPOT 6 MONTH KIT) IM injection kit 45 mg          Discussion:  I am happy to see the patient doing as well as he is, PSA has slightly increased from the last value, if this continues to increase 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 with need for potential future systemic therapy        Assessment and plan:       Please see problem oriented charting for the assessment plan of today's urological complaints    Earnest Payan MD      Chief Complaint     Chief Complaint   Patient presents with   Kirill Echevarria    prostate cancer screening    Of note, this patient has prostate cancer and as such the prostate cancer screening chief complaint is incorrect      History of Present Illness     Jo Jones is a 80 y o  gentleman with a history of prostate cancer, 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  He is doing weight training and is quite active with a good performance status of ECOG 0 indicating no detriment  His spirits are quite good today, he has no new voiding complaints  He does continue have nocturia 3-4 times per night, this does not bother him much  No dysuria, incontinence, hesitancy, gross hematuria, urgency, stream quality is good and he usually feels empty after voiding but not always  The following portions of the patient's history were reviewed and updated as appropriate: allergies, current medications, past family history, past medical history, past social history, past surgical history and problem list     Detailed Urologic History     - please refer to HPI    Review of Systems     Review of Systems   Constitutional: Negative  HENT: Negative  Eyes: Negative  Respiratory: Negative  Cardiovascular: Negative  Gastrointestinal: Negative  Endocrine: Negative  Genitourinary:        Nocturia   Musculoskeletal: Negative  Skin: Negative  Allergic/Immunologic: Negative  Neurological: Negative  Hematological: Negative  Psychiatric/Behavioral: Negative  Allergies     Allergies   Allergen Reactions    Lactate Diarrhea       Physical Exam     Physical Exam   Constitutional: He is oriented to person, place, and time  He appears well-developed and well-nourished  No distress  HENT:   Head: Normocephalic and atraumatic     Right Ear: External ear normal    Left Ear: External ear normal    Nose: Nose normal  Eyes: Conjunctivae and EOM are normal  Pupils are equal, round, and reactive to light  Right eye exhibits no discharge  Left eye exhibits no discharge  No scleral icterus  Neck: Normal range of motion  Neck supple  No tracheal deviation present  No thyromegaly present  Cardiovascular: Regular rhythm and intact distal pulses  Pulmonary/Chest: Effort normal  No stridor  No respiratory distress  He has no wheezes  Abdominal: Soft  He exhibits no distension and no mass  There is no tenderness  There is no rebound and no guarding  No hernia  Genitourinary:   Genitourinary Comments: No suprapubic tenderness   Musculoskeletal: Normal range of motion  He exhibits no edema, tenderness or deformity  Lymphadenopathy:     He has no cervical adenopathy  Neurological: He is alert and oriented to person, place, and time  No cranial nerve deficit  Coordination normal    Skin: Skin is warm and dry  No rash noted  He is not diaphoretic  No erythema  No pallor  Psychiatric: He has a normal mood and affect  His behavior is normal  Judgment and thought content normal    Nursing note and vitals reviewed  Vital Signs  Vitals:    09/26/18 1238   BP: 144/80   Pulse: 80   Weight: 90 8 kg (200 lb 3 2 oz)   Height: 5' 9" (1 753 m)         Current Medications       Current Outpatient Prescriptions:     amLODIPine (NORVASC) 10 mg tablet, TAKE 1 TABLET (10 MG TOTAL) BY MOUTH DAILY, Disp: 90 tablet, Rfl: 1    Coenzyme Q10 (COQ10) 100 MG CAPS, Take by mouth, Disp: , Rfl:     latanoprost (XALATAN) 0 005 % ophthalmic solution, INSTILL 1 DROP INTO BOTH EYES AT BEDTIME   FILL ON 01-09-18, Disp: , Rfl: 0    MEGARED OMEGA-3 KRILL  MG CAPS, Take by mouth, Disp: , Rfl:     metFORMIN (GLUCOPHAGE) 850 mg tablet, Take 1 tablet (850 mg total) by mouth 2 (two) times a day, Disp: 180 tablet, Rfl: 1    multivitamin (THERAGRAN) TABS, Take 1 tablet by mouth daily, Disp: , Rfl:     Saw Palmetto, Serenoa repens, 450 MG CAPS, Take by mouth, Disp: , Rfl:     Na Sulfate-K Sulfate-Mg Sulf 17 5-3 13-1 6 GM/180ML SOLN, Take 1 applicator by mouth once for 1 dose, Disp: 1 Bottle, Rfl: 0    Current Facility-Administered Medications:     leuprolide (LUPRON DEPOT 6 MONTH KIT) IM injection kit 45 mg, 45 mg, Intramuscular, Once, [place] MD Jama      Active Problems     Patient Active Problem List   Diagnosis    Swelling of joint, wrist, right    Rising PSA level    Pro[facility] ([facility] )    Neoplasm of prostate, 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    Glaucoma    Benign essential hypertension    Colon cancer screening    Type 2 diabetes mellitus without complication ([place] [address] )         Past Medical History     Past Medical History:   Diagnosis Date    Diabetes mellitus ([facility] )     Hx of radiation therapy     Hypertension     Malignant neoplasm of prostate ([facility] )     Shingles 02/22/2018         Surgical History     Past Surgical History:   Procedure Laterality Date    COLONOSCOPY      9 years ago    [facility] COLONOSCOPY FLX DX W/COLLJ SPEC WHEN PFRMD N/A 6/25/2018    Procedure: COLONOSCOPY;  Surgeon: Filippo Camara MD;  [facility];   Service: Gastroenterology         Family History     Family History   Problem Relation Age of Onset    Diabetes Mother     Hypertension Mother     Heart disease Father         cardiac disorder    Diabetes Father     Hypertension Father     Prostate cancer Father     Diabetes Sister     Hypertension Sister     Multiple myeloma Brother          Social History     Social History     History   Smoking Status    Former Smoker    Quit date: 1969   Smokeless Tobacco    Never Used         Pertinent Lab Values     Lab Results   Component Value Date    CREATININE 0 86 08/01/2018       Lab Results   Component Value Date    PSA 0 3 08/01/2018    PSA <0 1 05/09/2018    PSA 4 3 (H) 02/06/2018             Pertinent Imaging      There is no pertinent urological imaging for my review

## 2018-09-26 NOTE — ASSESSMENT & PLAN NOTE
Patient currently on androgen deprivation therapy, PSA originally decreased to 0 1, most recently 0 3, still relatively good control, ECOG performance status of 0 indicating no detriment to his functioning  In excellent spirits, no detriment from hormone deprivation therapy at this time  Plan:  Due for Lupron today, he will follow up in 6 months for repeat Lupron injection, we will check his PSA again in 6 months time  He does have a visit with the medical oncologist going forward, and should his PSA increase again he may benefit from other treatments for castrate resistant prostate cancer in that setting

## 2018-11-06 ENCOUNTER — APPOINTMENT (OUTPATIENT)
Dept: LAB | Facility: CLINIC | Age: 68
End: 2018-11-06
Payer: MEDICARE

## 2018-11-06 DIAGNOSIS — E11.9 TYPE 2 DIABETES MELLITUS WITHOUT COMPLICATION, WITHOUT LONG-TERM CURRENT USE OF INSULIN (HCC): ICD-10-CM

## 2018-11-06 DIAGNOSIS — C61 NEOPLASM OF PROSTATE, DISTANT METASTASIS STAGING CATEGORY M1B: METASTASIS TO BONE (HCC): ICD-10-CM

## 2018-11-06 DIAGNOSIS — C61 PROSTATE CANCER (HCC): ICD-10-CM

## 2018-11-06 DIAGNOSIS — C79.51 NEOPLASM OF PROSTATE, DISTANT METASTASIS STAGING CATEGORY M1B: METASTASIS TO BONE (HCC): ICD-10-CM

## 2018-11-06 LAB
ALBUMIN SERPL BCP-MCNC: 4.2 G/DL (ref 3.5–5)
ALP SERPL-CCNC: 117 U/L (ref 46–116)
ALT SERPL W P-5'-P-CCNC: 21 U/L (ref 12–78)
ANION GAP SERPL CALCULATED.3IONS-SCNC: 5 MMOL/L (ref 4–13)
AST SERPL W P-5'-P-CCNC: 18 U/L (ref 5–45)
BASOPHILS # BLD AUTO: 0.05 THOUSANDS/ΜL (ref 0–0.1)
BASOPHILS NFR BLD AUTO: 1 % (ref 0–1)
BILIRUB SERPL-MCNC: 0.42 MG/DL (ref 0.2–1)
BUN SERPL-MCNC: 17 MG/DL (ref 5–25)
CALCIUM SERPL-MCNC: 9.6 MG/DL (ref 8.3–10.1)
CHLORIDE SERPL-SCNC: 100 MMOL/L (ref 100–108)
CHOLEST SERPL-MCNC: 197 MG/DL (ref 50–200)
CO2 SERPL-SCNC: 31 MMOL/L (ref 21–32)
CREAT SERPL-MCNC: 0.79 MG/DL (ref 0.6–1.3)
EOSINOPHIL # BLD AUTO: 0.08 THOUSAND/ΜL (ref 0–0.61)
EOSINOPHIL NFR BLD AUTO: 2 % (ref 0–6)
ERYTHROCYTE [DISTWIDTH] IN BLOOD BY AUTOMATED COUNT: 15.2 % (ref 11.6–15.1)
EST. AVERAGE GLUCOSE BLD GHB EST-MCNC: 134 MG/DL
GFR SERPL CREATININE-BSD FRML MDRD: 107 ML/MIN/1.73SQ M
GLUCOSE P FAST SERPL-MCNC: 93 MG/DL (ref 65–99)
HBA1C MFR BLD: 6.3 % (ref 4.2–6.3)
HCT VFR BLD AUTO: 43.8 % (ref 36.5–49.3)
HDLC SERPL-MCNC: 84 MG/DL (ref 40–60)
HGB BLD-MCNC: 13.4 G/DL (ref 12–17)
IMM GRANULOCYTES # BLD AUTO: 0.01 THOUSAND/UL (ref 0–0.2)
IMM GRANULOCYTES NFR BLD AUTO: 0 % (ref 0–2)
LDLC SERPL CALC-MCNC: 100 MG/DL (ref 0–100)
LYMPHOCYTES # BLD AUTO: 1.98 THOUSANDS/ΜL (ref 0.6–4.47)
LYMPHOCYTES NFR BLD AUTO: 39 % (ref 14–44)
MCH RBC QN AUTO: 24.7 PG (ref 26.8–34.3)
MCHC RBC AUTO-ENTMCNC: 30.6 G/DL (ref 31.4–37.4)
MCV RBC AUTO: 81 FL (ref 82–98)
MONOCYTES # BLD AUTO: 0.52 THOUSAND/ΜL (ref 0.17–1.22)
MONOCYTES NFR BLD AUTO: 10 % (ref 4–12)
NEUTROPHILS # BLD AUTO: 2.38 THOUSANDS/ΜL (ref 1.85–7.62)
NEUTS SEG NFR BLD AUTO: 48 % (ref 43–75)
NONHDLC SERPL-MCNC: 113 MG/DL
NRBC BLD AUTO-RTO: 0 /100 WBCS
PLATELET # BLD AUTO: 246 THOUSANDS/UL (ref 149–390)
PMV BLD AUTO: 11.2 FL (ref 8.9–12.7)
POTASSIUM SERPL-SCNC: 4.2 MMOL/L (ref 3.5–5.3)
PROT SERPL-MCNC: 7.9 G/DL (ref 6.4–8.2)
PSA SERPL-MCNC: 0.3 NG/ML (ref 0–4)
RBC # BLD AUTO: 5.42 MILLION/UL (ref 3.88–5.62)
SODIUM SERPL-SCNC: 136 MMOL/L (ref 136–145)
TRIGL SERPL-MCNC: 65 MG/DL
WBC # BLD AUTO: 5.02 THOUSAND/UL (ref 4.31–10.16)

## 2018-11-06 PROCEDURE — 80053 COMPREHEN METABOLIC PANEL: CPT

## 2018-11-06 PROCEDURE — 85025 COMPLETE CBC W/AUTO DIFF WBC: CPT

## 2018-11-06 PROCEDURE — 36415 COLL VENOUS BLD VENIPUNCTURE: CPT

## 2018-11-06 PROCEDURE — 80061 LIPID PANEL: CPT

## 2018-11-06 PROCEDURE — 83036 HEMOGLOBIN GLYCOSYLATED A1C: CPT

## 2018-11-06 PROCEDURE — 84153 ASSAY OF PSA TOTAL: CPT

## 2018-11-12 ENCOUNTER — OFFICE VISIT (OUTPATIENT)
Dept: FAMILY MEDICINE CLINIC | Facility: CLINIC | Age: 68
End: 2018-11-12
Payer: MEDICARE

## 2018-11-12 VITALS
BODY MASS INDEX: 29.03 KG/M2 | DIASTOLIC BLOOD PRESSURE: 68 MMHG | OXYGEN SATURATION: 96 % | WEIGHT: 196 LBS | HEIGHT: 69 IN | HEART RATE: 88 BPM | SYSTOLIC BLOOD PRESSURE: 138 MMHG | RESPIRATION RATE: 16 BRPM

## 2018-11-12 DIAGNOSIS — E55.9 VITAMIN D DEFICIENCY: ICD-10-CM

## 2018-11-12 DIAGNOSIS — E11.9 TYPE 2 DIABETES MELLITUS WITHOUT COMPLICATION, WITHOUT LONG-TERM CURRENT USE OF INSULIN (HCC): ICD-10-CM

## 2018-11-12 DIAGNOSIS — C79.89 PROSTATE CANCER METASTATIC TO PELVIS (HCC): ICD-10-CM

## 2018-11-12 DIAGNOSIS — C61 PROSTATE CANCER METASTATIC TO PELVIS (HCC): ICD-10-CM

## 2018-11-12 DIAGNOSIS — I10 BENIGN ESSENTIAL HYPERTENSION: ICD-10-CM

## 2018-11-12 DIAGNOSIS — Z00.00 WELCOME TO MEDICARE PREVENTIVE VISIT: Primary | ICD-10-CM

## 2018-11-12 PROCEDURE — G0402 INITIAL PREVENTIVE EXAM: HCPCS | Performed by: FAMILY MEDICINE

## 2018-11-12 NOTE — ASSESSMENT & PLAN NOTE
His Welcome to Medicare exam is done, eye exam is normal and he will follow up with ophthalmologist for glaucoma checked    His labs are stable, diabetes hypertension stable, labs ordered for the future and follow-up in 4 months  He is up-to-date on colonoscopy and he refused for vaccination

## 2018-11-12 NOTE — PROGRESS NOTES
Assessment/Plan:    Problem List Items Addressed This Visit        Endocrine    Type 2 diabetes mellitus without complication ([facility] )    Relevant Orders    CBC and differential    Comprehensive metabolic panel    Hemoglobin A1C    Lipid panel    Microalbumin / creatinine urine ratio    TSH, 3rd generation       Cardiovascular and Mediastinum    Benign essential hypertension    Relevant Orders    TSH, 3rd generation       Other    Welcome to Medicare preventive visit - Primary     His Welcome to Medicare exam is done, eye exam is normal and he will follow up with ophthalmologist for glaucoma checked  His labs are stable, diabetes hypertension stable, labs ordered for the future and follow-up in 4 months  He is up-to-date on colonoscopy and he refused for vaccination           Other Visit Diagnoses     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 [facility])        Relevant Orders    Vitamin D 25 hydroxy    Vitamin D deficiency         Relevant Orders    Vitamin D 25 hydroxy          Chief Complaint   Patient presents with    Medicare Wellness Visit     Initial       Subjective:   Patient ID: Xin Bradley is a 80 y o  male  He is here for Welcome to Medicare visit  He has prostate cancer with mets to the bone but he has been stable PSA within normal range he is on Lupron for the treatment  Previous he had radio and chemotherapy  Does not want any vaccination        Review of Systems   Constitutional: Negative for activity change, appetite change, chills, diaphoresis, fatigue, fever and unexpected weight change  HENT: Negative for congestion, dental problem, ear discharge, ear pain, facial swelling, hearing loss, mouth sores, nosebleeds, postnasal drip, rhinorrhea, sinus pain, sinus pressure, sneezing, sore throat, trouble swallowing and voice change  Eyes: Negative for photophobia, pain, discharge, redness and itching  Respiratory: Negative for cough, chest tightness, shortness of breath and wheezing      Cardiovascular: Negative for chest pain, palpitations and leg swelling  Gastrointestinal: Negative for abdominal distention, abdominal pain, blood in stool, constipation, diarrhea and nausea  Endocrine: Negative for cold intolerance, heat intolerance, polydipsia, polyphagia and polyuria  Genitourinary: Negative for dysuria, flank pain, frequency, hematuria and urgency  Musculoskeletal: Negative for arthralgias, back pain, myalgias and neck pain  Skin: Negative for color change and pallor  Allergic/Immunologic: Negative for environmental allergies and food allergies  Neurological: Negative for dizziness, weakness, light-headedness, numbness and headaches  Hematological: Negative for adenopathy  Does not bruise/bleed easily  Psychiatric/Behavioral: Negative for behavioral problems, sleep disturbance and suicidal ideas  The patient is not nervous/anxious  Objective:  Physical Exam   Constitutional: He is oriented to person, place, and time  He appears well-developed and well-nourished  HENT:   Head: Normocephalic and atraumatic  Right Ear: External ear normal    Left Ear: External ear normal    Nose: Nose normal    Mouth/Throat: Oropharynx is clear and moist  No oropharyngeal exudate  Eyes: Pupils are equal, round, and reactive to light  Conjunctivae and EOM are normal  Right eye exhibits no discharge  Left eye exhibits no discharge  No scleral icterus  Neck: Normal range of motion  Neck supple  No tracheal deviation present  No thyromegaly present  Cardiovascular: Normal rate, regular rhythm and normal heart sounds  No murmur heard  Pulmonary/Chest: Effort normal and breath sounds normal  No respiratory distress  He has no wheezes  He has no rales  Abdominal: Soft  Bowel sounds are normal  He exhibits no distension and no mass  There is no tenderness  There is no rebound  Musculoskeletal: Normal range of motion  He exhibits no edema  Lymphadenopathy:     He has no cervical adenopathy  Neurological: He is alert and oriented to person, place, and time  He has normal reflexes  No cranial nerve deficit  Skin: Skin is warm  No rash noted  No erythema  No pallor  Psychiatric: He has a normal mood and affect  His behavior is normal  Judgment and thought content normal    Nursing note and vitals reviewed  Past Surgical History:   Procedure Laterality Date    COLONOSCOPY      9 years ago    [place] COLONOSCOPY FLX DX W/COLLJ SPEC WHEN PFRMD N/A 6/25/2018    Procedure: COLONOSCOPY;  Surgeon: Alise Irwin MD;  Location[facility]; Service: Gastroenterology       Family History   Problem Relation Age of Onset    Diabetes Mother     Hypertension Mother     Heart disease Father         cardiac disorder    Diabetes Father     Hypertension Father     Prostate cancer Father     Diabetes Sister     Hypertension Sister     Multiple myeloma Brother          Current Outpatient Prescriptions:     amLODIPine (NORVASC) 10 mg tablet, TAKE 1 TABLET (10 MG TOTAL) BY MOUTH DAILY, Disp: 90 tablet, Rfl: 1    Coenzyme Q10 (COQ10) 100 MG CAPS, Take by mouth, Disp: , Rfl:     latanoprost (XALATAN) 0 005 % ophthalmic solution, INSTILL 1 DROP INTO BOTH EYES AT BEDTIME   FILL ON 01-09-18, Disp: , Rfl: 0    MEGARED OMEGA-3 KRILL  MG CAPS, Take by mouth, Disp: , Rfl:     metFORMIN (GLUCOPHAGE) 850 mg tablet, Take 1 tablet (850 mg total) by mouth 2 (two) times a day, Disp: 180 tablet, Rfl: 1    Saw Palmetto, Serenoa repens, 450 MG CAPS, Take by mouth, Disp: , Rfl:     multivitamin (THERAGRAN) TABS, Take 1 tablet by mouth daily, Disp: , Rfl:     Na Sulfate-K Sulfate-Mg Sulf 17 5-3 13-1 6 GM/180ML SOLN, Take 1 applicator by mouth once for 1 dose, Disp: 1 Bottle, Rfl: 0    Allergies   Allergen Reactions    Lactate Diarrhea       Vitals:    11/12/18 0806   BP: 138/68   Pulse: 88   Resp: 16   SpO2: 96%   Weight: 88 9 kg (196 lb)   Height: 5' 9" (1 753 m)

## 2018-11-13 ENCOUNTER — OFFICE VISIT (OUTPATIENT)
Dept: HEMATOLOGY ONCOLOGY | Facility: CLINIC | Age: 68
End: 2018-11-13
Payer: MEDICARE

## 2018-11-13 VITALS
RESPIRATION RATE: 16 BRPM | HEART RATE: 67 BPM | TEMPERATURE: 99 F | WEIGHT: 196.5 LBS | HEIGHT: 69 IN | OXYGEN SATURATION: 99 % | BODY MASS INDEX: 29.1 KG/M2

## 2018-11-13 DIAGNOSIS — C61 PROSTATE CANCER (HCC): Primary | ICD-10-CM

## 2018-11-13 PROCEDURE — 99214 OFFICE O/P EST MOD 30 MIN: CPT | Performed by: INTERNAL MEDICINE

## 2018-11-13 NOTE — PROGRESS NOTES
HEMATOLOGY / ONCOLOGY CLINIC NOTE    Primary Care Provider: Jeyson Fischer MD  Referring Provider: Self, Referral  MRN: [id number]  : 1950    Reason for Encounter:    Chief Complaint   Patient presents with    Follow-up     patient is here for a follow up visit         History of Hematology / Oncology Illness:     Theo Bence is a 80 y o  male who came in for follow up  Castration naive  prostate Cancer, ? metastatic  with right pelvic bone lesion ( ? padget disease )   D/X: PSA doubled from 1 8 -->  3 6 within 9 month;  Bone scan positive for increased activity in right hemipelvis,   Suggesting metastatic lesion  No biopsy  Tr:     - Received Mayra Fallon in 2018,  switch to Casodex plus Lupron managed by Urology  - on calcium vitamin-D has patient is on ADT  -  No indication for  Taxotere;     PSA 0 3 in 2018     Interval History:       3/20:   Patient came in for follow-up  Reported overall doing well  He is actively exercising, losing weight, no new bone pains,  No change of urination bowel movements  Taking casodex, Lupron, no major side effects  :  Today for follow-up for doing well  Decreased libido  Otherwise no new bone lesion bone pain  Body weight is stable  Able to   Keep up with work  :  Came in for  follow-up, reported doing very well, no new bone pain  Body weight stable  Problem list:       Patient Active Problem List   Diagnosis    Swelling of joint, wrist, right    Rising PSA level    Prostate cancer ([facility] [address] )    Neoplasm of prostate, 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 ([address] )    Glaucoma    Benign essential hypertension    Colon cancer screening    Type 2 diabetes mellitus without complication ([address] )    Welcome to Medicare preventive visit       Assessment / Plan:     1   Prostate cancer ([address] )  -    Castration sensitive  -    Continue Casodex Lupron  managed by Urology   -      No indication for Taxotere          plan  - continue surveillance with labs /   PSA level  every 3 months  -      I will follow patient in 12 months             25    minutes were spent on this visit  All questions answered to satisfaction; Advised pt to call if there is any further questions  Addendum : PSA < 0 1  Good response to therapy  Already reported severe decreased libido  Discussed with pt regarding the risk and benefit of Zytiga again, decided to forgo zytiga and continue current therapy  PHYSICIAL EXAMINATION:     Vital Signs:   [unfilled]  Body mass index is 29 02 kg/m²  Body surface area is 2 05 meters squared  GEN: Alert, awake oriented x3, in no acute distress  HEENT- No pallor, icterus, cyanosis, no oral mucosal lesions,   LAD - no palpable cervical, clavicle, axillary, inguinal LAD  Heart- normal S1 S2, regular rate and rhythm, No murmur, rubs  Lungs- clear breathing sound bilateral    Abdomen- soft, Non tender, bowel sounds present  Extremities- No cyanosis, clubbing, edema  Neuro- No focal neurological deficit           PAST MEDICAL HISTORY:   has a past medical history of Diabetes mellitus ([facility] [address] ); radiation therapy; Hypertension; Malignant neoplasm of prostate ([facility] [address] ); and Shingles (02/22/2018)  PAST SURGICAL HISTORY:   has a past surgical history that includes Colonoscopy and pr colonoscopy flx dx w/collj spec when pfrmd (N/A, 6/25/2018)  CURRENT MEDICATIONS:     Current Outpatient Prescriptions   Medication Sig Dispense Refill    amLODIPine (NORVASC) 10 mg tablet TAKE 1 TABLET (10 MG TOTAL) BY MOUTH DAILY 90 tablet 1    Coenzyme Q10 (COQ10) 100 MG CAPS Take by mouth      latanoprost (XALATAN) 0 005 % ophthalmic solution INSTILL 1 DROP INTO BOTH EYES AT BEDTIME   FILL ON 01-09-18  0    MEGARED OMEGA-3 KRILL  MG CAPS Take by mouth      metFORMIN (GLUCOPHAGE) 850 mg tablet Take 1 tablet (850 mg total) by mouth 2 (two) times a day 180 tablet 1    multivitamin (THERAGRAN) TABS Take 1 tablet by mouth daily      Saw Palmetto, Serenoa repens, 450 MG CAPS Take by mouth      Na Sulfate-K Sulfate-Mg Sulf 17 5-3 13-1 6 [id number] SOLN Take 1 applicator by mouth once for 1 dose 1 Bottle 0     No current facility-administered medications for this visit  [unfilled]    SOCIAL HISTORY:   reports that he quit smoking about 49 years ago  He has never used smokeless tobacco  He reports that he does not drink alcohol or use drugs  FAMILY HISTORY:  family history includes Diabetes in his father, mother, and sister; Heart disease in his father; Hypertension in his father, mother, and sister; Multiple myeloma in his brother; Prostate cancer in his father  ALLERGIES:  is allergic to lactate  REVIEW OF SYSTEMS:  Please note that a 14-point review of systems was performed to include Constitutional, HEENT, Respiratory, CVS, GI, , Musculoskeletal, Integumentary, Neurologic, Rheumatologic, Endocrinologic, Psychiatric, Lymphatic, and Hematologic/Oncologic systems were reviewed and are negative unless otherwise stated in HPI  Positive and negative findings pertinent to this evaluation are incorporated into the history of present illness  LAB:    Lab Results   Component Value Date    WBC 5 02 11/06/2018    HGB 13 4 11/06/2018    HCT 43 8 11/06/2018    MCV 81 (L) 11/06/2018     11/06/2018       Lab Results   Component Value Date    K 4 2 11/06/2018     11/06/2018    CO2 31 11/06/2018    BUN 17 11/06/2018    CREATININE 0 79 11/06/2018    GLUF 93 11/06/2018    CALCIUM 9 6 11/06/2018    AST 18 11/06/2018    ALT 21 11/06/2018    ALKPHOS 117 (H) 11/06/2018    EGFR 107 11/06/2018       IMAGING:    No orders to display     Dxa Bone Density Spine Hip And Pelvis    Result Date: 3/19/2018  Narrative: CENTRAL  DXA SCAN CLINICAL HISTORY:   80year old -American male risk factors include previous smoking  History of prostate carcinoma  Non-insulin-dependent diabetes  Osteoporosis screening  TECHNIQUE: Bone densitometry was performed using a Horizon A bone densitometer  Regions of interest appear properly placed  There are no obvious fractures or other confounding variables which could limit the study  Degenerative changes of the lumbar spine and hip  This will falsely elevate the bone mineral densities in these regions  COMPARISON:  None  RESULTS: LUMBAR SPINE:  L1-L4: BMD 1 269 gm/cm2 T-score 1 6 Z-score 1 5 LUMBAR SPINE L2-L4 (average) : BMD 1 325 gm/sq-cm            T-score is 1 9            Z-score is 1 8 LEFT TOTAL HIP: BMD 1 175 gm/cm2 T-score 0 9 Z-score 0 9 LEFT FEMORAL NECK: BMD 0 965 gm/cm2 T-score 0 3 Z-score 0 7     Impression: 1  Based on the [facility] classification, this study is normal and the patient is considered at low risk for fracture  2  A daily intake of calcium of at least 1200 mg and vitamin D, 800-1000 IU, as well as weight bearing and muscle strengthening exercise, fall prevention and avoidance of tobacco and excessive alcohol intake as basic preventive measures are recommended  3  Repeat DXA scan on the same equipment in 18-24 months as clinically indicated  The 10 year risk of hip fracture is 0 1%, with the 10 year risk of major osteoporotic fracture being 1 6%, as calculated by the [facility] fracture risk assessment tool (FRAX)  The current NOF guidelines recommend treating patients with FRAX 10 year risk score  of >3% for hip fracture and >20% for major osteoporotic fracture   WHO CLASSIFICATION: Normal (a T-score of -1 0 or higher) Low bone mineral density (a T-score of less than -1 0 but higher than -2 5) Osteoporosis (a T-score of -2 5 or less) Severe osteoporosis (a T-score of -2 5 or less with a fragility fracture)   Workstation performed: [id number]

## 2019-01-25 DIAGNOSIS — E11.9 TYPE 2 DIABETES MELLITUS WITHOUT COMPLICATION, WITHOUT LONG-TERM CURRENT USE OF INSULIN (HCC): ICD-10-CM

## 2019-03-06 ENCOUNTER — APPOINTMENT (OUTPATIENT)
Dept: LAB | Facility: CLINIC | Age: 69
End: 2019-03-06
Payer: MEDICARE

## 2019-03-06 ENCOUNTER — TRANSCRIBE ORDERS (OUTPATIENT)
Dept: LAB | Facility: CLINIC | Age: 69
End: 2019-03-06

## 2019-03-06 DIAGNOSIS — E11.9 TYPE 2 DIABETES MELLITUS WITHOUT COMPLICATION, WITHOUT LONG-TERM CURRENT USE OF INSULIN (HCC): ICD-10-CM

## 2019-03-06 DIAGNOSIS — C79.89 PROSTATE CANCER METASTATIC TO PELVIS (HCC): ICD-10-CM

## 2019-03-06 DIAGNOSIS — E55.9 VITAMIN D DEFICIENCY: ICD-10-CM

## 2019-03-06 DIAGNOSIS — C61 PROSTATE CANCER METASTATIC TO PELVIS (HCC): ICD-10-CM

## 2019-03-06 DIAGNOSIS — I10 BENIGN ESSENTIAL HYPERTENSION: ICD-10-CM

## 2019-03-06 LAB
25(OH)D3 SERPL-MCNC: 35.1 NG/ML (ref 30–100)
ALBUMIN SERPL BCP-MCNC: 4.2 G/DL (ref 3.5–5)
ALP SERPL-CCNC: 134 U/L (ref 46–116)
ALT SERPL W P-5'-P-CCNC: 22 U/L (ref 12–78)
ANION GAP SERPL CALCULATED.3IONS-SCNC: 7 MMOL/L (ref 4–13)
AST SERPL W P-5'-P-CCNC: 24 U/L (ref 5–45)
BASOPHILS # BLD AUTO: 0.04 THOUSANDS/ΜL (ref 0–0.1)
BASOPHILS NFR BLD AUTO: 1 % (ref 0–1)
BILIRUB SERPL-MCNC: 0.59 MG/DL (ref 0.2–1)
BUN SERPL-MCNC: 18 MG/DL (ref 5–25)
CALCIUM SERPL-MCNC: 9.1 MG/DL (ref 8.3–10.1)
CHLORIDE SERPL-SCNC: 103 MMOL/L (ref 100–108)
CHOLEST SERPL-MCNC: 208 MG/DL (ref 50–200)
CO2 SERPL-SCNC: 30 MMOL/L (ref 21–32)
CREAT SERPL-MCNC: 0.74 MG/DL (ref 0.6–1.3)
CREAT UR-MCNC: 35.9 MG/DL
EOSINOPHIL # BLD AUTO: 0.11 THOUSAND/ΜL (ref 0–0.61)
EOSINOPHIL NFR BLD AUTO: 2 % (ref 0–6)
ERYTHROCYTE [DISTWIDTH] IN BLOOD BY AUTOMATED COUNT: 15.1 % (ref 11.6–15.1)
EST. AVERAGE GLUCOSE BLD GHB EST-MCNC: 134 MG/DL
GFR SERPL CREATININE-BSD FRML MDRD: 110 ML/MIN/1.73SQ M
GLUCOSE P FAST SERPL-MCNC: 97 MG/DL (ref 65–99)
HBA1C MFR BLD: 6.3 % (ref 4.2–6.3)
HCT VFR BLD AUTO: 42.9 % (ref 36.5–49.3)
HDLC SERPL-MCNC: 100 MG/DL (ref 40–60)
HGB BLD-MCNC: 13.3 G/DL (ref 12–17)
IMM GRANULOCYTES # BLD AUTO: 0.01 THOUSAND/UL (ref 0–0.2)
IMM GRANULOCYTES NFR BLD AUTO: 0 % (ref 0–2)
LDLC SERPL CALC-MCNC: 96 MG/DL (ref 0–100)
LYMPHOCYTES # BLD AUTO: 1.96 THOUSANDS/ΜL (ref 0.6–4.47)
LYMPHOCYTES NFR BLD AUTO: 39 % (ref 14–44)
MCH RBC QN AUTO: 24.7 PG (ref 26.8–34.3)
MCHC RBC AUTO-ENTMCNC: 31 G/DL (ref 31.4–37.4)
MCV RBC AUTO: 80 FL (ref 82–98)
MICROALBUMIN UR-MCNC: <5 MG/L (ref 0–20)
MICROALBUMIN/CREAT 24H UR: <14 MG/G CREATININE (ref 0–30)
MONOCYTES # BLD AUTO: 0.51 THOUSAND/ΜL (ref 0.17–1.22)
MONOCYTES NFR BLD AUTO: 10 % (ref 4–12)
NEUTROPHILS # BLD AUTO: 2.35 THOUSANDS/ΜL (ref 1.85–7.62)
NEUTS SEG NFR BLD AUTO: 48 % (ref 43–75)
NONHDLC SERPL-MCNC: 108 MG/DL
NRBC BLD AUTO-RTO: 0 /100 WBCS
PLATELET # BLD AUTO: 213 THOUSANDS/UL (ref 149–390)
PMV BLD AUTO: 11.2 FL (ref 8.9–12.7)
POTASSIUM SERPL-SCNC: 3.9 MMOL/L (ref 3.5–5.3)
PROT SERPL-MCNC: 7.9 G/DL (ref 6.4–8.2)
RBC # BLD AUTO: 5.39 MILLION/UL (ref 3.88–5.62)
SODIUM SERPL-SCNC: 140 MMOL/L (ref 136–145)
TRIGL SERPL-MCNC: 62 MG/DL
TSH SERPL DL<=0.05 MIU/L-ACNC: 1.4 UIU/ML (ref 0.36–3.74)
WBC # BLD AUTO: 4.98 THOUSAND/UL (ref 4.31–10.16)

## 2019-03-06 PROCEDURE — 80061 LIPID PANEL: CPT

## 2019-03-06 PROCEDURE — 36415 COLL VENOUS BLD VENIPUNCTURE: CPT

## 2019-03-06 PROCEDURE — 83036 HEMOGLOBIN GLYCOSYLATED A1C: CPT

## 2019-03-06 PROCEDURE — 82306 VITAMIN D 25 HYDROXY: CPT

## 2019-03-06 PROCEDURE — 84443 ASSAY THYROID STIM HORMONE: CPT

## 2019-03-06 PROCEDURE — 80053 COMPREHEN METABOLIC PANEL: CPT

## 2019-03-06 PROCEDURE — 82043 UR ALBUMIN QUANTITATIVE: CPT | Performed by: FAMILY MEDICINE

## 2019-03-06 PROCEDURE — 85025 COMPLETE CBC W/AUTO DIFF WBC: CPT

## 2019-03-06 PROCEDURE — 82570 ASSAY OF URINE CREATININE: CPT | Performed by: FAMILY MEDICINE

## 2019-03-12 ENCOUNTER — OFFICE VISIT (OUTPATIENT)
Dept: FAMILY MEDICINE CLINIC | Facility: CLINIC | Age: 69
End: 2019-03-12
Payer: MEDICARE

## 2019-03-12 ENCOUNTER — APPOINTMENT (OUTPATIENT)
Dept: LAB | Facility: CLINIC | Age: 69
End: 2019-03-12
Payer: MEDICARE

## 2019-03-12 ENCOUNTER — TRANSCRIBE ORDERS (OUTPATIENT)
Dept: LAB | Facility: CLINIC | Age: 69
End: 2019-03-12

## 2019-03-12 VITALS
HEART RATE: 81 BPM | BODY MASS INDEX: 29.18 KG/M2 | WEIGHT: 197 LBS | RESPIRATION RATE: 16 BRPM | SYSTOLIC BLOOD PRESSURE: 122 MMHG | OXYGEN SATURATION: 97 % | DIASTOLIC BLOOD PRESSURE: 70 MMHG | HEIGHT: 69 IN

## 2019-03-12 DIAGNOSIS — I10 ESSENTIAL HYPERTENSION: ICD-10-CM

## 2019-03-12 DIAGNOSIS — I10 BENIGN ESSENTIAL HYPERTENSION: ICD-10-CM

## 2019-03-12 DIAGNOSIS — E11.9 TYPE 2 DIABETES MELLITUS WITHOUT COMPLICATION, WITHOUT LONG-TERM CURRENT USE OF INSULIN (HCC): Primary | ICD-10-CM

## 2019-03-12 PROCEDURE — 99214 OFFICE O/P EST MOD 30 MIN: CPT | Performed by: FAMILY MEDICINE

## 2019-03-12 RX ORDER — AMLODIPINE BESYLATE 10 MG/1
10 TABLET ORAL DAILY
Qty: 90 TABLET | Refills: 1 | Status: SHIPPED | OUTPATIENT
Start: 2019-03-12 | End: 2019-09-08 | Stop reason: SDUPTHER

## 2019-03-12 NOTE — ASSESSMENT & PLAN NOTE
Lab Results   Component Value Date    HGBA1C 6 3 03/06/2019     Cont metformin , recheck labs 4 months   He refused for any vaccination for pneumonia and influenza  :

## 2019-03-12 NOTE — PROGRESS NOTES
Assessment/Plan:    Problem List Items Addressed This Visit        Endocrine    Type 2 diabetes mellitus without complication ([facility] [address] ) - Primary     Lab Results   Component Value Date    HGBA1C 6 3 03/06/2019     Cont metformin , recheck labs 4 months   He refused for any vaccination for pneumonia and influenza  :           Relevant Orders    CBC and differential    Comprehensive metabolic panel    Hemoglobin A1C    Lipid panel       Cardiovascular and Mediastinum    Benign essential hypertension     Hypertension is stable, he will continue amlodipine advised to have recheck labs in 4 months         Relevant Medications    amLODIPine (NORVASC) 10 mg tablet      Other Visit Diagnoses     Essential hypertension        Relevant Medications    amLODIPine (NORVASC) 10 mg tablet          Chief Complaint   Patient presents with    Follow-up     review labs       Subjective:   Patient ID: Ricky Lanza is a 77 y o  male  He is here for follow-up on diabetes, he takes his metformin twice a day and he says when he does not eat he does not take his medicine  He is very active he denies any new symptoms he says he follows his Oncology and Urology and he is due for his PSA  stable prostate cancer , Currently taking blood pressure medicine amlodipine and he has no headache chest pain shortness of breath  He does not want to have any vaccination  He is up-to-date on colonoscopy         Review of Systems   Constitutional: Negative for activity change, appetite change, chills, diaphoresis, fatigue, fever and unexpected weight change  HENT: Negative for congestion, dental problem, ear discharge, ear pain, facial swelling, hearing loss, mouth sores, nosebleeds, postnasal drip, rhinorrhea, sinus pressure, sinus pain, sneezing, sore throat, trouble swallowing and voice change  Eyes: Negative for photophobia, pain, discharge, redness and itching  Respiratory: Negative for cough, chest tightness, shortness of breath and wheezing  Cardiovascular: Negative for chest pain, palpitations and leg swelling  Gastrointestinal: Negative for abdominal distention, abdominal pain, blood in stool, constipation, diarrhea and nausea  Endocrine: Negative for cold intolerance, heat intolerance, polydipsia, polyphagia and polyuria  Genitourinary: Negative for dysuria, flank pain, frequency, hematuria and urgency  Musculoskeletal: Negative for arthralgias, back pain, myalgias and neck pain  Skin: Negative for color change and pallor  Allergic/Immunologic: Negative for environmental allergies and food allergies  Neurological: Negative for dizziness, weakness, light-headedness, numbness and headaches  Hematological: Negative for adenopathy  Does not bruise/bleed easily  Psychiatric/Behavioral: Negative for behavioral problems, sleep disturbance and suicidal ideas  The patient is not nervous/anxious  Objective:  Physical Exam   Constitutional: He is oriented to person, place, and time  He appears well-developed and well-nourished  HENT:   Head: Normocephalic and atraumatic  Right Ear: External ear normal    Left Ear: External ear normal    Nose: Nose normal    Mouth/Throat: Oropharynx is clear and moist  No oropharyngeal exudate  Eyes: Pupils are equal, round, and reactive to light  Conjunctivae and EOM are normal  Right eye exhibits no discharge  Left eye exhibits no discharge  No scleral icterus  Neck: Normal range of motion  Neck supple  No tracheal deviation present  No thyromegaly present  Cardiovascular: Normal rate, regular rhythm and normal heart sounds  No murmur heard  Pulmonary/Chest: Effort normal and breath sounds normal  No respiratory distress  He has no wheezes  He has no rales  Abdominal: Soft  Bowel sounds are normal  He exhibits no distension and no mass  There is no tenderness  There is no rebound  Musculoskeletal: Normal range of motion  He exhibits no edema     Lymphadenopathy:     He has no cervical adenopathy  Neurological: He is alert and oriented to person, place, and time  He has normal reflexes  No cranial nerve deficit  Skin: Skin is warm  No rash noted  No erythema  No pallor  Psychiatric: He has a normal mood and affect  His behavior is normal  Judgment and thought content normal    Nursing note and vitals reviewed  Past Surgical History:   Procedure Laterality Date    COLONOSCOPY      9 years ago    [place] COLONOSCOPY FLX DX W/COLLJ SPEC WHEN PFRMD N/A 6/25/2018    Procedure: COLONOSCOPY;  Surgeon: Cecilia Chne MD;  Location: [facility]; Service: Gastroenterology       Family History   Problem Relation Age of Onset    Diabetes Mother     Hypertension Mother     Heart disease Father         cardiac disorder    Diabetes Father     Hypertension Father     Prostate cancer Father     Diabetes Sister     Hypertension Sister     Multiple myeloma Brother          Current Outpatient Medications:     amLODIPine (NORVASC) 10 mg tablet, Take 1 tablet (10 mg total) by mouth daily, Disp: 90 tablet, Rfl: 1    Coenzyme Q10 (COQ10) 100 MG CAPS, Take by mouth, Disp: , Rfl:     latanoprost (XALATAN) 0 005 % ophthalmic solution, INSTILL 1 DROP INTO BOTH EYES AT BEDTIME   FILL ON 01-09-18, Disp: , Rfl: 0    MEGARED OMEGA-3 KRILL  MG CAPS, Take by mouth, Disp: , Rfl:     metFORMIN (GLUCOPHAGE) 850 mg tablet, Take 1 tablet (850 mg total) by mouth 2 (two) times a day, Disp: 180 tablet, Rfl: 1    multivitamin (THERAGRAN) TABS, Take 1 tablet by mouth daily, Disp: , Rfl:     Na Sulfate-K Sulfate-Mg Sulf 17 5-3 13-1 6 GM/180ML SOLN, Take 1 applicator by mouth once for 1 dose, Disp: 1 Bottle, Rfl: 0    Saw Palmetto, Serenoa repens, 450 MG CAPS, Take by mouth, Disp: , Rfl:     Allergies   Allergen Reactions    Lactate Diarrhea       Vitals:    03/12/19 0755   BP: 122/70   Pulse: 81   Resp: 16   SpO2: 97%   Weight: 89 4 kg (197 lb)   Height: 5' 9" (1 753 m)

## 2019-04-23 ENCOUNTER — OFFICE VISIT (OUTPATIENT)
Dept: UROLOGY | Facility: CLINIC | Age: 69
End: 2019-04-23
Payer: MEDICARE

## 2019-04-23 VITALS
WEIGHT: 193 LBS | BODY MASS INDEX: 28.58 KG/M2 | HEIGHT: 69 IN | SYSTOLIC BLOOD PRESSURE: 122 MMHG | HEART RATE: 72 BPM | DIASTOLIC BLOOD PRESSURE: 100 MMHG

## 2019-04-23 DIAGNOSIS — C61 NEOPLASM OF PROSTATE, DISTANT METASTASIS STAGING CATEGORY M1B: METASTASIS TO BONE (HCC): ICD-10-CM

## 2019-04-23 DIAGNOSIS — C79.51 NEOPLASM OF PROSTATE, DISTANT METASTASIS STAGING CATEGORY M1B: METASTASIS TO BONE (HCC): ICD-10-CM

## 2019-04-23 DIAGNOSIS — C61 PROSTATE CANCER (HCC): Primary | ICD-10-CM

## 2019-04-23 PROCEDURE — 99214 OFFICE O/P EST MOD 30 MIN: CPT | Performed by: UROLOGY

## 2019-06-28 ENCOUNTER — TELEPHONE (OUTPATIENT)
Dept: FAMILY MEDICINE CLINIC | Facility: CLINIC | Age: 69
End: 2019-06-28

## 2019-07-11 LAB
LEFT EYE DIABETIC RETINOPATHY: NORMAL
RIGHT EYE DIABETIC RETINOPATHY: NORMAL

## 2019-08-12 ENCOUNTER — APPOINTMENT (OUTPATIENT)
Dept: LAB | Facility: AMBULARY SURGERY CENTER | Age: 69
End: 2019-08-12
Payer: MEDICARE

## 2019-08-12 DIAGNOSIS — E11.9 TYPE 2 DIABETES MELLITUS WITHOUT COMPLICATION, WITHOUT LONG-TERM CURRENT USE OF INSULIN (HCC): ICD-10-CM

## 2019-08-12 LAB
ALBUMIN SERPL BCP-MCNC: 3.8 G/DL (ref 3.5–5)
ALP SERPL-CCNC: 140 U/L (ref 46–116)
ALT SERPL W P-5'-P-CCNC: 21 U/L (ref 12–78)
ANION GAP SERPL CALCULATED.3IONS-SCNC: 5 MMOL/L (ref 4–13)
AST SERPL W P-5'-P-CCNC: 20 U/L (ref 5–45)
BASOPHILS # BLD AUTO: 0.04 THOUSANDS/ΜL (ref 0–0.1)
BASOPHILS NFR BLD AUTO: 1 % (ref 0–1)
BILIRUB SERPL-MCNC: 0.51 MG/DL (ref 0.2–1)
BUN SERPL-MCNC: 17 MG/DL (ref 5–25)
CALCIUM SERPL-MCNC: 9.3 MG/DL (ref 8.3–10.1)
CHLORIDE SERPL-SCNC: 105 MMOL/L (ref 100–108)
CHOLEST SERPL-MCNC: 221 MG/DL (ref 50–200)
CO2 SERPL-SCNC: 30 MMOL/L (ref 21–32)
CREAT SERPL-MCNC: 0.82 MG/DL (ref 0.6–1.3)
EOSINOPHIL # BLD AUTO: 0.13 THOUSAND/ΜL (ref 0–0.61)
EOSINOPHIL NFR BLD AUTO: 3 % (ref 0–6)
ERYTHROCYTE [DISTWIDTH] IN BLOOD BY AUTOMATED COUNT: 15.6 % (ref 11.6–15.1)
EST. AVERAGE GLUCOSE BLD GHB EST-MCNC: 128 MG/DL
GFR SERPL CREATININE-BSD FRML MDRD: 105 ML/MIN/1.73SQ M
GLUCOSE P FAST SERPL-MCNC: 111 MG/DL (ref 65–99)
HBA1C MFR BLD: 6.1 % (ref 4.2–6.3)
HCT VFR BLD AUTO: 42.2 % (ref 36.5–49.3)
HDLC SERPL-MCNC: 91 MG/DL (ref 40–60)
HGB BLD-MCNC: 13 G/DL (ref 12–17)
IMM GRANULOCYTES # BLD AUTO: 0.01 THOUSAND/UL (ref 0–0.2)
IMM GRANULOCYTES NFR BLD AUTO: 0 % (ref 0–2)
LDLC SERPL CALC-MCNC: 118 MG/DL (ref 0–100)
LYMPHOCYTES # BLD AUTO: 1.27 THOUSANDS/ΜL (ref 0.6–4.47)
LYMPHOCYTES NFR BLD AUTO: 31 % (ref 14–44)
MCH RBC QN AUTO: 24.3 PG (ref 26.8–34.3)
MCHC RBC AUTO-ENTMCNC: 30.8 G/DL (ref 31.4–37.4)
MCV RBC AUTO: 79 FL (ref 82–98)
MONOCYTES # BLD AUTO: 0.4 THOUSAND/ΜL (ref 0.17–1.22)
MONOCYTES NFR BLD AUTO: 10 % (ref 4–12)
NEUTROPHILS # BLD AUTO: 2.31 THOUSANDS/ΜL (ref 1.85–7.62)
NEUTS SEG NFR BLD AUTO: 55 % (ref 43–75)
NONHDLC SERPL-MCNC: 130 MG/DL
NRBC BLD AUTO-RTO: 0 /100 WBCS
PLATELET # BLD AUTO: 198 THOUSANDS/UL (ref 149–390)
PMV BLD AUTO: 11.5 FL (ref 8.9–12.7)
POTASSIUM SERPL-SCNC: 4.2 MMOL/L (ref 3.5–5.3)
PROT SERPL-MCNC: 7.5 G/DL (ref 6.4–8.2)
RBC # BLD AUTO: 5.34 MILLION/UL (ref 3.88–5.62)
SODIUM SERPL-SCNC: 140 MMOL/L (ref 136–145)
TRIGL SERPL-MCNC: 61 MG/DL
WBC # BLD AUTO: 4.16 THOUSAND/UL (ref 4.31–10.16)

## 2019-08-12 PROCEDURE — 80061 LIPID PANEL: CPT

## 2019-08-12 PROCEDURE — 36415 COLL VENOUS BLD VENIPUNCTURE: CPT

## 2019-08-12 PROCEDURE — 80053 COMPREHEN METABOLIC PANEL: CPT

## 2019-08-12 PROCEDURE — 83036 HEMOGLOBIN GLYCOSYLATED A1C: CPT

## 2019-08-12 PROCEDURE — 85025 COMPLETE CBC W/AUTO DIFF WBC: CPT

## 2019-08-15 ENCOUNTER — OFFICE VISIT (OUTPATIENT)
Dept: FAMILY MEDICINE CLINIC | Facility: CLINIC | Age: 69
End: 2019-08-15
Payer: MEDICARE

## 2019-08-15 VITALS
DIASTOLIC BLOOD PRESSURE: 72 MMHG | HEART RATE: 74 BPM | SYSTOLIC BLOOD PRESSURE: 120 MMHG | BODY MASS INDEX: 29.47 KG/M2 | OXYGEN SATURATION: 98 % | WEIGHT: 199 LBS | RESPIRATION RATE: 16 BRPM | HEIGHT: 69 IN

## 2019-08-15 DIAGNOSIS — E11.9 TYPE 2 DIABETES MELLITUS WITHOUT COMPLICATION, WITHOUT LONG-TERM CURRENT USE OF INSULIN (HCC): Primary | ICD-10-CM

## 2019-08-15 DIAGNOSIS — I10 BENIGN ESSENTIAL HYPERTENSION: ICD-10-CM

## 2019-08-15 DIAGNOSIS — Z11.59 NEED FOR HEPATITIS C SCREENING TEST: ICD-10-CM

## 2019-08-15 PROCEDURE — 99214 OFFICE O/P EST MOD 30 MIN: CPT | Performed by: FAMILY MEDICINE

## 2019-08-15 NOTE — ASSESSMENT & PLAN NOTE
Lab Results   Component Value Date    HGBA1C 6 1 08/12/2019     His eye exam is up-to-date and foot exam is completed today which is normal he wants to control diabetes now with his diet as he gets side effects from metformin and does not want to start any new medication Till next time  He refused for Pneumovax

## 2019-08-15 NOTE — PROGRESS NOTES
Assessment/Plan:    Problem List Items Addressed This Visit        Endocrine    Type 2 diabetes mellitus without complication ([facility] [address] ) - Primary    Relevant Orders    CBC and differential    Comprehensive metabolic panel    Hemoglobin A1C    Lipid panel    Microalbumin / creatinine urine ratio  For 6 weeks he is not taking metformin because of GI side effects, and he is working more on his diet A1c is stable, will recheck labs in 3-4 months, he says he does not want to start any new medication because he is trying best with his diet and exercise       Cardiovascular and Mediastinum    Benign essential hypertension  Hypertension is stable continue amlodipine       Other    Need for hepatitis C screening test    Relevant Orders    Hepatitis C antibody          Chief Complaint   Patient presents with    Follow-up       Subjective:   Patient ID: Marion Smith is a 77 y o  male  He is diabetic and he says his stop taking metformin for 6 weeks because he had some side effects including arm fecal incontinence he said initially it started slowly but it has a big problem so he could not take this medicine  And since he stopped the medicine he is feeling better now back to normal, now is working on his diet cutting down carbs  which increases sugar, he also increase his exercise to control his blood sugar  He takes his blood pressure medicine amlodipine and hypertension is well controlled, he has prostate cancer and he follows urologist recently his PSA is not high and he decided not to take any medication and he will follow up with urologist in 6 months with another PSA  He has no fever or chills and overall he feels fine he is due for his foot exam today  He refused for the pneumonia vaccine  Nonsmoker and no alcohol    Patient's shoes and socks removed  Right Foot/Ankle   Right Foot Inspection  Skin Exam: skin normal skin not intact, no dry skin, no warmth, no callus, no erythema, no maceration, no abnormal color, no pre-ulcer, no ulcer and no callus                          Toe Exam: ROM and strength within normal limits  Sensory   Vibration: intact  Proprioception: intact   Monofilament testing: intact  Vascular  Capillary refills: < 3 seconds  The right DP pulse is 2+  Left Foot/Ankle  Left Foot Inspection  Skin Exam: skin normalskin not intact, no dry skin, no warmth, no erythema, no maceration, normal color, no pre-ulcer, no ulcer and no callus                         Toe Exam: ROM and strength within normal limits                   Sensory   Vibration: intact  Proprioception: intact  Monofilament: intact  Vascular  Capillary refills: < 3 seconds  The left DP pulse is 2+  Assign Risk Category:  ; ;        Risk: 0        Review of Systems   Constitutional: Negative for activity change, appetite change, chills, diaphoresis, fatigue, fever and unexpected weight change  HENT: Negative for congestion, dental problem, ear discharge, ear pain, facial swelling, hearing loss, mouth sores, nosebleeds, postnasal drip, rhinorrhea, sinus pressure, sinus pain, sneezing, sore throat, trouble swallowing and voice change  Eyes: Negative for photophobia, pain, discharge, redness and itching  Respiratory: Negative for cough, chest tightness, shortness of breath and wheezing  Cardiovascular: Negative for chest pain, palpitations and leg swelling  Gastrointestinal: Negative for abdominal distention, abdominal pain, blood in stool, constipation, diarrhea and nausea  Endocrine: Negative for cold intolerance, heat intolerance, polydipsia, polyphagia and polyuria  Genitourinary: Negative for dysuria, flank pain, frequency, hematuria and urgency  Musculoskeletal: Negative for arthralgias, back pain, myalgias and neck pain  Skin: Negative for color change and pallor  Allergic/Immunologic: Negative for environmental allergies and food allergies     Neurological: Negative for dizziness, weakness, light-headedness, numbness and headaches  Hematological: Negative for adenopathy  Does not bruise/bleed easily  Psychiatric/Behavioral: Negative for behavioral problems, sleep disturbance and suicidal ideas  The patient is not nervous/anxious  Objective:  Physical Exam   Constitutional: He is oriented to person, place, and time  He appears well-developed and well-nourished  HENT:   Head: Normocephalic and atraumatic  Nose: Nose normal    Mouth/Throat: Oropharynx is clear and moist  No oropharyngeal exudate  Eyes: Pupils are equal, round, and reactive to light  Conjunctivae and EOM are normal  Right eye exhibits no discharge  Left eye exhibits no discharge  No scleral icterus  Neck: Normal range of motion  Neck supple  No tracheal deviation present  No thyromegaly present  Cardiovascular: Normal rate, regular rhythm and normal heart sounds  No murmur heard  Pulses:       Dorsalis pedis pulses are 2+ on the right side, and 2+ on the left side  Pulmonary/Chest: Effort normal and breath sounds normal  No respiratory distress  He has no wheezes  He has no rales  Abdominal: Soft  Bowel sounds are normal  He exhibits no distension and no mass  There is no tenderness  There is no rebound  Musculoskeletal: Normal range of motion  He exhibits no edema  Feet:   Right Foot:   Skin Integrity: Negative for ulcer, skin breakdown, erythema, warmth, callus or dry skin  Left Foot:   Skin Integrity: Negative for ulcer, skin breakdown, erythema, warmth, callus or dry skin  Lymphadenopathy:     He has no cervical adenopathy  Neurological: He is alert and oriented to person, place, and time  He has normal reflexes  No cranial nerve deficit  Skin: Skin is warm  No rash noted  No erythema  No pallor  Psychiatric: He has a normal mood and affect  His behavior is normal  Judgment and thought content normal    Nursing note and vitals reviewed  BMI Counseling: Body mass index is 29 39 kg/m²   Discussed the patient's BMI with him  The BMI is above average  BMI counseling and education was provided to the patient  Nutrition recommendations include reducing portion sizes, decreasing overall calorie intake, reducing fast food intake and reducing intake of cholesterol  Exercise recommendations include moderate aerobic physical activity for 150 minutes/week  Past Surgical History:   Procedure Laterality Date    COLONOSCOPY      9 years ago    [place] COLONOSCOPY FLX DX W/COLLJ SPEC WHEN PFRMD N/A 6/25/2018    Procedure: COLONOSCOPY;  Surgeon: Wilbert Waters MD;  Location: [facility]; Service: Gastroenterology       Family History   Problem Relation Age of Onset    Diabetes Mother     Hypertension Mother     Heart disease Father         cardiac disorder    Diabetes Father     Hypertension Father     Prostate cancer Father     Diabetes Sister     Hypertension Sister     Multiple myeloma Brother          Current Outpatient Medications:     amLODIPine (NORVASC) 10 mg tablet, Take 1 tablet (10 mg total) by mouth daily, Disp: 90 tablet, Rfl: 1    Coenzyme Q10 (COQ10) 100 MG CAPS, Take by mouth, Disp: , Rfl:     latanoprost (XALATAN) 0 005 % ophthalmic solution, INSTILL 1 DROP INTO BOTH EYES AT BEDTIME   FILL ON 01-09-18, Disp: , Rfl: 0    MEGARED OMEGA-3 KRILL  MG CAPS, Take by mouth, Disp: , Rfl:     metFORMIN (GLUCOPHAGE) 850 mg tablet, Take 1 tablet (850 mg total) by mouth 2 (two) times a day, Disp: 180 tablet, Rfl: 1    multivitamin (THERAGRAN) TABS, Take 1 tablet by mouth daily, Disp: , Rfl:     Saw Palmetto, Serenoa repens, 450 MG CAPS, Take by mouth, Disp: , Rfl:     Na Sulfate-K Sulfate-Mg Sulf 17 5-3 13-1 6 GM/180ML SOLN, Take 1 applicator by mouth once for 1 dose, Disp: 1 Bottle, Rfl: 0    Allergies   Allergen Reactions    Lactate Diarrhea       Vitals:    08/15/19 0854   BP: 120/72   Pulse: 74   Resp: 16   SpO2: 98%   Weight: 90 3 kg (199 lb)   Height: 5' 9" (1 753 m)

## 2019-09-08 DIAGNOSIS — I10 ESSENTIAL HYPERTENSION: ICD-10-CM

## 2019-09-09 RX ORDER — AMLODIPINE BESYLATE 10 MG/1
TABLET ORAL
Qty: 90 TABLET | Refills: 1 | Status: SHIPPED | OUTPATIENT
Start: 2019-09-09 | End: 2020-03-02

## 2019-10-17 ENCOUNTER — APPOINTMENT (OUTPATIENT)
Dept: LAB | Facility: AMBULARY SURGERY CENTER | Age: 69
End: 2019-10-17
Attending: UROLOGY
Payer: MEDICARE

## 2019-10-17 DIAGNOSIS — C61 PROSTATE CANCER (HCC): ICD-10-CM

## 2019-10-17 LAB — PSA SERPL-MCNC: 3.2 NG/ML (ref 0–4)

## 2019-10-17 PROCEDURE — 84153 ASSAY OF PSA TOTAL: CPT

## 2019-10-18 NOTE — PROGRESS NOTES
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 [facility])       Other    Rising PSA level          Discussion:    Joe Alexander is doing quite well  He feels much better off of the Lupron and does not want to restart this today, this is certainly reasonable  His PSA has increased to 3 2 from a value of 0 3, however this is still lower than his previous value of 3 6 when he and I 1st met 1 another  Rectal examination today shows no lesions within the rectal fossa, there is no bone pain, his performance status is excellent with a performance status of 0, I recommend that we continue checking his PSA every 6 months, and that we have a higher threshold going forward for restarting androgen deprivation therapy  Assessment and plan:       Please see problem oriented charting for the assessment plan of today's urological complaints    Beck Ivey MD      Chief Complaint     Chief Complaint   Patient presents with    Prostate Cancer         History of Present Illness     Savana Chang is a 77 y o  gentleman that I have followed for quite some time now, he has a history of metastatic prostate cancer, performance status has been 0 up until now, in terms of subjective complaints today he has none, feeling excellent, his energy is much better as is his sexual performance off of Lupron, not surprisingly  At his last visit he decided to undergo no further Lupron therapy at that time given that his PSA was 0 3, since stopping Lupron his PSA has increased, to levels that are slightly similar to his PSA when I 1st saw him  In terms of restarting Lupron he wishes to forego this medication at this time  In terms of other complaints today he has none      The following portions of the patient's history were reviewed and updated as appropriate: allergies, current medications, past family history, past medical history, past social history, past surgical history and problem list     Detailed Urologic History     - please refer to HPI    Review of Systems     Review of Systems   Constitutional: Negative  HENT: Negative  Eyes: Negative  Respiratory: Negative  Cardiovascular: Negative  Gastrointestinal: Negative  Endocrine: Negative  Genitourinary:        As per HPI   Musculoskeletal: Negative  Skin: Negative  Allergic/Immunologic: Negative  Neurological: Negative  Hematological: Negative  Psychiatric/Behavioral: Negative  Allergies     Allergies   Allergen Reactions    Lactate Diarrhea       Physical Exam     Physical Exam   Constitutional: He is oriented to person, place, and time  He appears well-developed and well-nourished  No distress  HENT:   Head: Normocephalic and atraumatic  Eyes: Pupils are equal, round, and reactive to light  EOM are normal  Right eye exhibits no discharge  Left eye exhibits no discharge  Neck: No tracheal deviation present  Cardiovascular: Intact distal pulses  Pulmonary/Chest: Effort normal  No stridor  No respiratory distress  Abdominal: Soft  He exhibits no distension and no mass  There is no tenderness  There is no rebound and no guarding  No hernia  Genitourinary:   Genitourinary Comments: Rectal fossa shows no concerning changes   Musculoskeletal: He exhibits no edema, tenderness or deformity  Neurological: He is alert and oriented to person, place, and time  No cranial nerve deficit  Coordination normal    Skin: Skin is warm and dry  No rash noted  He is not diaphoretic  No erythema  No pallor  Psychiatric: He has a normal mood and affect  His behavior is normal  Judgment and thought content normal    Nursing note and vitals reviewed            Vital Signs  Vitals:    10/22/19 0731   BP: 136/72   BP Location: Left arm   Patient Position: Sitting   Cuff Size: Standard   Pulse: 76   Weight: 91 2 kg (201 lb)   Height: 5' 9" (1 753 m)         Current Medications       Current Outpatient Medications:     amLODIPine (NORVASC) 10 mg tablet, TAKE 1 TABLET BY MOUTH EVERY DAY, Disp: 90 tablet, Rfl: 1    Coenzyme Q10 (COQ10) 100 MG CAPS, Take by mouth, Disp: , Rfl:     latanoprost (XALATAN) 0 005 % ophthalmic solution, INSTILL 1 DROP INTO BOTH EYES AT BEDTIME  FILL ON 01-09-18, Disp: , Rfl: 0    MEGARED OMEGA-3 KRILL  MG CAPS, Take by mouth, Disp: , Rfl:     metFORMIN (GLUCOPHAGE) 850 mg tablet, Take 1 tablet (850 mg total) by mouth 2 (two) times a day, Disp: 180 tablet, Rfl: 1    multivitamin (THERAGRAN) TABS, Take 1 tablet by mouth daily, Disp: , Rfl:     NON FORMULARY, 2 (two) times a day CBD OIL, Disp: , Rfl:     Saw Palmetto, Serenoa repens, 450 MG CAPS, Take by mouth, Disp: , Rfl:       Active Problems     Patient Active Problem List   Diagnosis    Swelling of joint, wrist, right    Rising PSA level    [facility]    Neoplasm of prostate, 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 ([facility]    Glaucoma    Benign essential hypertension    Colon cancer screening    Type 2 diabetes mellitus without complication ([facility]    Welcome to Medicare preventive visit    Need for hepatitis C screening test         Past Medical History     Past Medical History:   Diagnosis Date    [facility] mellitus [facility]     Hx of radiation therapy     Hypertension     Malignant neoplasm of prostate ([facility]     Shingles 02/22/2018         Surgical History     Past Surgical History:   Procedure Laterality Date    COLONOSCOPY      9 years ago    NE COLONOSCOPY FLX DX W/COLLJ SPEC WHEN PFRMD N/A 6/25/2018    Procedure: COLONOSCOPY;  Surgeon: Aziza Wetzel MD;  [facility];   Service: Gastroenterology         Family History     Family History   Problem Relation Age of Onset    Diabetes Mother     Hypertension Mother     Heart disease Father         cardiac disorder    Diabetes Father     Hypertension Father     Prostate cancer Father     Diabetes Sister     Hypertension Sister     Multiple myeloma Brother          Social History     Social History     Social History     Tobacco Use   Smoking Status Former Smoker    Last attempt to quit: [place] Years since quittin 8   Smokeless Tobacco Never Used         Pertinent Lab Values     Lab Results   Component Value Date    CREATININE 0 82 2019       Lab Results   Component Value Date    PSA 3 2 10/17/2019    PSA 0 3 2019    PSA 0 3 2018             Pertinent Imaging      No new imaging for my review

## 2019-10-22 ENCOUNTER — OFFICE VISIT (OUTPATIENT)
Dept: UROLOGY | Facility: CLINIC | Age: 69
End: 2019-10-22
Payer: MEDICARE

## 2019-10-22 VITALS
HEIGHT: 69 IN | BODY MASS INDEX: 29.77 KG/M2 | WEIGHT: 201 LBS | SYSTOLIC BLOOD PRESSURE: 136 MMHG | HEART RATE: 76 BPM | DIASTOLIC BLOOD PRESSURE: 72 MMHG

## 2019-10-22 DIAGNOSIS — R97.20 RISING PSA LEVEL: ICD-10-CM

## 2019-10-22 DIAGNOSIS — C61 NEOPLASM OF PROSTATE, DISTANT METASTASIS STAGING CATEGORY M1B: METASTASIS TO BONE (HCC): ICD-10-CM

## 2019-10-22 DIAGNOSIS — C61 PROSTATE CANCER (HCC): Primary | ICD-10-CM

## 2019-10-22 DIAGNOSIS — C79.51 NEOPLASM OF PROSTATE, DISTANT METASTASIS STAGING CATEGORY M1B: METASTASIS TO BONE (HCC): ICD-10-CM

## 2019-10-22 PROCEDURE — 99213 OFFICE O/P EST LOW 20 MIN: CPT | Performed by: UROLOGY

## 2019-10-22 NOTE — LETTER
October 22, 2019     Lela Lewis MD  [address]  [facility] [address]    Patient: Margy Campos   YOB: 1950   Date of Visit: 10/22/2019       Dear Dr Layla Kuo:    Thank you for referring Margy Campos to me for evaluation  Below are my notes for this consultation  If you have questions, please do not hesitate to call me  I look forward to following your patient along with you  Sincerely,        Carmen Yoder MD        CC: Liam Rodriguez MD PhD  Carmen Yoder MD  10/22/2019  7:48 AM  Sign at close encounter       Problem List Items Addressed This Visit        Genitourinary    Prostate cancer [facility]) - Primary    Neoplasm of prostate, 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 [facility])       Other    Rising PSA level          Discussion:    Galina Escalante is doing quite well  He feels much better off of the Lupron and does not want to restart this today, this is certainly reasonable  His PSA has increased to 3 2 from a value of 0 3, however this is still lower than his previous value of 3 6 when he and I 1st met 1 another  Rectal examination today shows no lesions within the rectal fossa, there is no bone pain, his performance status is excellent with a performance status of 0, I recommend that we continue checking his PSA every 6 months, and that we have a higher threshold going forward for restarting androgen deprivation therapy          Assessment and plan:       Please see problem oriented charting for the assessment plan of today's urological complaints    Carmen Yoder MD      Chief Complaint     Chief Complaint   Patient presents with    Prostate Cancer         History of Present Illness     Margy Campos is a 77 y o  gentleman that I have followed for quite some time now, he has a history of metastatic prostate cancer, performance status has been 0 up until now, in terms of subjective complaints today he has none, feeling excellent, his energy is much better as is his sexual performance off of Lupron, not surprisingly  At his last visit he decided to undergo no further Lupron therapy at that time given that his PSA was 0 3, since stopping Lupron his PSA has increased, to levels that are slightly similar to his PSA when I 1st saw him  In terms of restarting Lupron he wishes to forego this medication at this time  In terms of other complaints today he has none  The following portions of the patient's history were reviewed and updated as appropriate: allergies, current medications, past family history, past medical history, past social history, past surgical history and problem list     Detailed Urologic History     - please refer to HPI    Review of Systems     Review of Systems   Constitutional: Negative  HENT: Negative  Eyes: Negative  Respiratory: Negative  Cardiovascular: Negative  Gastrointestinal: Negative  Endocrine: Negative  Genitourinary:        As per HPI   Musculoskeletal: Negative  Skin: Negative  Allergic/Immunologic: Negative  Neurological: Negative  Hematological: Negative  Psychiatric/Behavioral: Negative  Allergies     Allergies   Allergen Reactions    Lactate Diarrhea       Physical Exam     Physical Exam   Constitutional: He is oriented to person, place, and time  He appears well-developed and well-nourished  No distress  HENT:   Head: Normocephalic and atraumatic  Eyes: Pupils are equal, round, and reactive to light  EOM are normal  Right eye exhibits no discharge  Left eye exhibits no discharge  Neck: No tracheal deviation present  Cardiovascular: Intact distal pulses  Pulmonary/Chest: Effort normal  No stridor  No respiratory distress  Abdominal: Soft  He exhibits no distension and no mass  There is no tenderness  There is no rebound and no guarding  No hernia     Genitourinary:   Genitourinary Comments: Rectal fossa shows no concerning changes   Musculoskeletal: He exhibits no edema, tenderness or deformity  Neurological: He is alert and oriented to person, place, and time  No cranial nerve deficit  Coordination normal    Skin: Skin is warm and dry  No rash noted  He is not diaphoretic  No erythema  No pallor  Psychiatric: He has a normal mood and affect  His behavior is normal  Judgment and thought content normal    Nursing note and vitals reviewed  Vital Signs  Vitals:    10/22/19 0731   BP: 136/72   BP Location: Left arm   Patient Position: Sitting   Cuff Size: Standard   Pulse: 76   Weight: 91 2 kg (201 lb)   Height: 5' 9" (1 753 m)         Current Medications       Current Outpatient Medications:     amLODIPine (NORVASC) 10 mg tablet, TAKE 1 TABLET BY MOUTH EVERY DAY, Disp: 90 tablet, Rfl: 1    Coenzyme Q10 (COQ10) 100 MG CAPS, Take by mouth, Disp: , Rfl:     latanoprost (XALATAN) 0 005 % ophthalmic solution, INSTILL 1 DROP INTO BOTH EYES AT BEDTIME   FILL ON 01-09-18, Disp: , Rfl: 0    MEGARED OMEGA-3 KRILL  MG CAPS, Take by mouth, Disp: , Rfl:     metFORMIN (GLUCOPHAGE) 850 mg tablet, Take 1 tablet (850 mg total) by mouth 2 (two) times a day, Disp: 180 tablet, Rfl: 1    multivitamin (THERAGRAN) TABS, Take 1 tablet by mouth daily, Disp: , Rfl:     NON FORMULARY, 2 (two) times a day CBD OIL, Disp: , Rfl:     Saw Palmetto, Serenoa repens, 450 MG CAPS, Take by mouth, Disp: , Rfl:       Active Problems     Patient Active Problem List   Diagnosis    Swelling of joint, wrist, right    Rising PSA level    [facility] ([facility]    Neoplasm of prostate, 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 ([facility] )    Glaucoma    Benign essential hypertension    Colon cancer screening    [facility]    Welcome to Medicare preventive visit    Need for hepatitis C screening test         Past Medical History     Past Medical History:   Diagnosis Date    Diabetes mellitus ([facility] [facility] )     Hx of radiation therapy     Hypertension     Malignant [facility]     Shingles 2018         Surgical History     Past Surgical History:   Procedure Laterality Date    COLONOSCOPY      9 years ago    [place] COLONOSCOPY FLX DX W/COLLJ SPEC WHEN PFRMD N/A 2018    Procedure: COLONOSCOPY;  Surgeon: Vanessa Dunbar MD;  [facility];   Service: Gastroenterology         Family History     Family History   Problem Relation Age of Onset    Diabetes Mother     Hypertension Mother     Heart disease Father         cardiac disorder    Diabetes Father     Hypertension Father     Prostate cancer Father     Diabetes Sister     Hypertension Sister     Multiple myeloma Brother          Social History     Social History     Social History     Tobacco Use   Smoking Status Former Smoker    Last attempt to quit: [place] Years since quittin 8   Smokeless Tobacco Never Used         Pertinent Lab Values     Lab Results   Component Value Date    CREATININE 0 82 2019       Lab Results   Component Value Date    PSA 3 2 10/17/2019    PSA 0 3 2019    PSA 0 3 2018             Pertinent Imaging      No new imaging for my review

## 2019-11-13 ENCOUNTER — OFFICE VISIT (OUTPATIENT)
Dept: HEMATOLOGY ONCOLOGY | Facility: CLINIC | Age: 69
End: 2019-11-13
Payer: MEDICARE

## 2019-11-13 VITALS
BODY MASS INDEX: 29.62 KG/M2 | HEART RATE: 71 BPM | SYSTOLIC BLOOD PRESSURE: 118 MMHG | HEIGHT: 69 IN | WEIGHT: 200 LBS | OXYGEN SATURATION: 98 % | RESPIRATION RATE: 16 BRPM | DIASTOLIC BLOOD PRESSURE: 76 MMHG | TEMPERATURE: 99.7 F

## 2019-11-13 DIAGNOSIS — C61 PROSTATE CANCER (HCC): ICD-10-CM

## 2019-11-13 DIAGNOSIS — R97.20 RISING PSA LEVEL: Primary | ICD-10-CM

## 2019-11-13 PROCEDURE — 99214 OFFICE O/P EST MOD 30 MIN: CPT | Performed by: INTERNAL MEDICINE

## 2019-11-13 NOTE — PROGRESS NOTES
HEMATOLOGY / ONCOLOGY CLINIC NOTE    Primary Care Provider: Cal Sheldon MD  Referring Provider:    MRN: [id number]  : 1950    Reason for Encounter:    Chief Complaint   Patient presents with    Follow-up     Prostate Cancer         History of Hematology / Oncology Illness:     Uyen Sen is a 77 y o  male who came in for follow up  Castration naive  prostate Cancer, ? metastatic  with right pelvic bone lesion ( ? padget disease )   D/X: PSA doubled from 1 8 -->  3 6 within 9 month;  Bone scan positive for increased activity in right hemipelvis,   Suggesting metastatic lesion  No biopsy  Tr:     - Received Noe Needle in 2018,  switch to Casodex plus Lupron managed by Urology  - on calcium vitamin-D has patient is on ADT  -  No indication for  Taxotere;   - 10/2019 : PSA 3 2 ;  lupron stopped  Following  , PSA to be checked Q 6m  Interval History:       3/20:   Patient came in for follow-up  Reported overall doing well  He is actively exercising, losing weight, no new bone pains,  No change of urination bowel movements  Taking casodex, Lupron, no major side effects  :  Today for follow-up for doing well  Decreased libido  Otherwise no new bone lesion bone pain  Body weight is stable  Able to   Keep up with work  2018 :  Came in for  follow-up, reported doing very well, no new bone pain  Body weight stable  2019 :  Came in for follow-up  Reported doing very well  No bone pain body weight is stable  Patient has been off Lupron, reported feeling well  He is also doing some daily fasting, is eating 1 meal a day now        Problem list:       Patient Active Problem List   Diagnosis    Swelling of joint, wrist, right    Rising PSA level    Prostate cancer ([address] )    Neoplasm of prostate, 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 ([address] )    Glaucoma    Benign essential hypertension    Colon cancer screening    Type 2 diabetes mellitus without complication ([facility] )    Welcome to Medicare preventive visit    Need for hepatitis C screening test       Assessment / Plan:     1  Prostate cancer ([place] [address] )  -    Castration sensitive;  Received  Casodex Lupron  managed by Urology for less than a year; lupron stopped  PSA is checked Q 6m  -      No indication for Taxotere          plan  -      overall patient doing well, he will continue follow by   No need to follow with Medical Oncology at this time  Follow-up as needed for the future  25    minutes were spent on this visit  All questions answered to satisfaction; Advised pt to call if there is any further questions  PHYSICIAL EXAMINATION:     Vital Signs:   [unfilled]  Body mass index is 29 53 kg/m²  Body surface area is 2 07 meters squared  No significant finding on physical examination  GEN: Alert, awake oriented x3, in no acute distress  HEENT- No pallor, icterus, cyanosis, no oral mucosal lesions,   LAD - no palpable cervical, clavicle, axillary, inguinal LAD  Heart- normal S1 S2, regular rate and rhythm, No murmur, rubs  Lungs- clear breathing sound bilateral    Abdomen- soft, Non tender, bowel sounds present  Extremities- No cyanosis, clubbing, edema  Neuro- No focal neurological deficit           PAST MEDICAL HISTORY:   has a past medical history of Diabetes mellitus ([place] [address] ), radiation therapy, Hypertension, Malignant neoplasm of prostate ([place] [address] ), and Shingles (02/22/2018)  PAST SURGICAL HISTORY:   has a past surgical history that includes Colonoscopy and pr colonoscopy flx dx w/collj spec when pfrmd (N/A, 6/25/2018)  CURRENT MEDICATIONS:     Current Outpatient Medications   Medication Sig Dispense Refill    amLODIPine (NORVASC) 10 mg tablet TAKE 1 TABLET BY MOUTH EVERY DAY 90 tablet 1    Coenzyme Q10 (COQ10) 100 MG CAPS Take by mouth      latanoprost (XALATAN) 0 005 % ophthalmic solution INSTILL 1 DROP INTO BOTH EYES AT BEDTIME   FILL ON 01-09-18  0  MEGARED OMEGA-3 KRILL  MG CAPS Take by mouth      metFORMIN (GLUCOPHAGE) 850 mg tablet Take 1 tablet (850 mg total) by mouth 2 (two) times a day 180 tablet 1    multivitamin (THERAGRAN) TABS Take 1 tablet by mouth daily      NON FORMULARY 2 (two) times a day CBD OIL      Saw Palmetto, Serenoa repens, 450 MG CAPS Take by mouth       No current facility-administered medications for this visit  [unfilled]    SOCIAL HISTORY:   reports that he quit smoking about 50 years ago  He has never used smokeless tobacco  He reports that he does not drink alcohol or use drugs  FAMILY HISTORY:  family history includes Diabetes in his father, mother, and sister; Heart disease in his father; Hypertension in his father, mother, and sister; Multiple myeloma in his brother; Prostate cancer in his father  ALLERGIES:  is allergic to lactate  REVIEW OF SYSTEMS:  Please note that a 14-point review of systems was performed to include Constitutional, HEENT, Respiratory, CVS, GI, , Musculoskeletal, Integumentary, Neurologic, Rheumatologic, Endocrinologic, Psychiatric, Lymphatic, and Hematologic/Oncologic systems were reviewed and are negative unless otherwise stated in HPI  Positive and negative findings pertinent to this evaluation are incorporated into the history of present illness              LAB:    Lab Results   Component Value Date    WBC 4 16 (L) 08/12/2019    HGB 13 0 08/12/2019    HCT 42 2 08/12/2019    MCV 79 (L) 08/12/2019     08/12/2019       Lab Results   Component Value Date    K 4 2 08/12/2019     08/12/2019    CO2 30 08/12/2019    BUN 17 08/12/2019    CREATININE 0 82 08/12/2019    GLUF 111 (H) 08/12/2019    CALCIUM 9 3 08/12/2019    AST 20 08/12/2019    ALT 21 08/12/2019    ALKPHOS 140 (H) 08/12/2019    EGFR 105 08/12/2019       IMAGING:    No orders to display     Dxa Bone Density Spine Hip And Pelvis    Result Date: 3/19/2018  Narrative: CENTRAL  DXA SCAN CLINICAL HISTORY:   68 year old -American male risk factors include previous smoking  History of prostate carcinoma  Non-insulin-dependent diabetes  Osteoporosis screening  TECHNIQUE: Bone densitometry was performed using a Horizon A bone densitometer  Regions of interest appear properly placed  There are no obvious fractures or other confounding variables which could limit the study  Degenerative changes of the lumbar spine and hip  This will falsely elevate the bone mineral densities in these regions  COMPARISON:  None  RESULTS: LUMBAR SPINE:  L1-L4: BMD 1 269 gm/cm2 T-score 1 6 Z-score 1 5 LUMBAR SPINE L2-L4 (average) : BMD 1 325 gm/sq-cm            T-score is 1 9            Z-score is 1 8 LEFT TOTAL HIP: BMD 1 175 gm/cm2 T-score 0 9 Z-score 0 9 LEFT FEMORAL NECK: BMD 0 965 gm/cm2 T-score 0 3 Z-score 0 7     Impression: 1  Based on the [facility] classification, this study is normal and the patient is considered at low risk for fracture  2  A daily intake of calcium of at least 1200 mg and vitamin D, 800-1000 IU, as well as weight bearing and muscle strengthening exercise, fall prevention and avoidance of tobacco and excessive alcohol intake as basic preventive measures are recommended  3  Repeat DXA scan on the same equipment in 18-24 months as clinically indicated  The 10 year risk of hip fracture is 0 1%, with the 10 year risk of major osteoporotic fracture being 1 6%, as calculated by the [facility] fracture risk assessment tool (FRAX)  The current NOF guidelines recommend treating patients with FRAX 10 year risk score  of >3% for hip fracture and >20% for major osteoporotic fracture   WHO CLASSIFICATION: Normal (a T-score of -1 0 or higher) Low bone mineral density (a T-score of less than -1 0 but higher than -2 5) Osteoporosis (a T-score of -2 5 or less) Severe osteoporosis (a T-score of -2 5 or less with a fragility fracture)   Workstation performed: [id number]

## 2019-11-19 ENCOUNTER — APPOINTMENT (OUTPATIENT)
Dept: LAB | Facility: AMBULARY SURGERY CENTER | Age: 69
End: 2019-11-19
Payer: MEDICARE

## 2019-11-19 DIAGNOSIS — E11.9 TYPE 2 DIABETES MELLITUS WITHOUT COMPLICATION, WITHOUT LONG-TERM CURRENT USE OF INSULIN (HCC): ICD-10-CM

## 2019-11-19 DIAGNOSIS — Z11.59 NEED FOR HEPATITIS C SCREENING TEST: ICD-10-CM

## 2019-11-19 LAB
BASOPHILS # BLD AUTO: 0.03 THOUSANDS/ΜL (ref 0–0.1)
BASOPHILS NFR BLD AUTO: 1 % (ref 0–1)
CHOLEST SERPL-MCNC: 221 MG/DL (ref 50–200)
CREAT UR-MCNC: 95.1 MG/DL
EOSINOPHIL # BLD AUTO: 0.09 THOUSAND/ΜL (ref 0–0.61)
EOSINOPHIL NFR BLD AUTO: 2 % (ref 0–6)
ERYTHROCYTE [DISTWIDTH] IN BLOOD BY AUTOMATED COUNT: 17.4 % (ref 11.6–15.1)
EST. AVERAGE GLUCOSE BLD GHB EST-MCNC: 126 MG/DL
HBA1C MFR BLD: 6 % (ref 4.2–6.3)
HCT VFR BLD AUTO: 44.9 % (ref 36.5–49.3)
HCV AB SER QL: NORMAL
HDLC SERPL-MCNC: 95 MG/DL
HGB BLD-MCNC: 13.6 G/DL (ref 12–17)
IMM GRANULOCYTES # BLD AUTO: 0.01 THOUSAND/UL (ref 0–0.2)
IMM GRANULOCYTES NFR BLD AUTO: 0 % (ref 0–2)
LDLC SERPL CALC-MCNC: 115 MG/DL (ref 0–100)
LYMPHOCYTES # BLD AUTO: 1.77 THOUSANDS/ΜL (ref 0.6–4.47)
LYMPHOCYTES NFR BLD AUTO: 33 % (ref 14–44)
MCH RBC QN AUTO: 24.2 PG (ref 26.8–34.3)
MCHC RBC AUTO-ENTMCNC: 30.3 G/DL (ref 31.4–37.4)
MCV RBC AUTO: 80 FL (ref 82–98)
MICROALBUMIN UR-MCNC: 5.1 MG/L (ref 0–20)
MICROALBUMIN/CREAT 24H UR: 5 MG/G CREATININE (ref 0–30)
MONOCYTES # BLD AUTO: 0.5 THOUSAND/ΜL (ref 0.17–1.22)
MONOCYTES NFR BLD AUTO: 9 % (ref 4–12)
NEUTROPHILS # BLD AUTO: 2.97 THOUSANDS/ΜL (ref 1.85–7.62)
NEUTS SEG NFR BLD AUTO: 55 % (ref 43–75)
NONHDLC SERPL-MCNC: 126 MG/DL
NRBC BLD AUTO-RTO: 0 /100 WBCS
PLATELET # BLD AUTO: 215 THOUSANDS/UL (ref 149–390)
PMV BLD AUTO: 11.7 FL (ref 8.9–12.7)
RBC # BLD AUTO: 5.63 MILLION/UL (ref 3.88–5.62)
TRIGL SERPL-MCNC: 54 MG/DL
WBC # BLD AUTO: 5.37 THOUSAND/UL (ref 4.31–10.16)

## 2019-11-19 PROCEDURE — 80061 LIPID PANEL: CPT

## 2019-11-19 PROCEDURE — 83036 HEMOGLOBIN GLYCOSYLATED A1C: CPT

## 2019-11-19 PROCEDURE — 86803 HEPATITIS C AB TEST: CPT

## 2019-11-19 PROCEDURE — 82043 UR ALBUMIN QUANTITATIVE: CPT | Performed by: FAMILY MEDICINE

## 2019-11-19 PROCEDURE — 82570 ASSAY OF URINE CREATININE: CPT | Performed by: FAMILY MEDICINE

## 2019-11-19 PROCEDURE — 85025 COMPLETE CBC W/AUTO DIFF WBC: CPT | Performed by: INTERNAL MEDICINE

## 2019-11-19 PROCEDURE — 36415 COLL VENOUS BLD VENIPUNCTURE: CPT | Performed by: INTERNAL MEDICINE

## 2019-11-21 ENCOUNTER — OFFICE VISIT (OUTPATIENT)
Dept: FAMILY MEDICINE CLINIC | Facility: CLINIC | Age: 69
End: 2019-11-21
Payer: MEDICARE

## 2019-11-21 VITALS
HEART RATE: 78 BPM | SYSTOLIC BLOOD PRESSURE: 130 MMHG | WEIGHT: 199 LBS | HEIGHT: 69 IN | OXYGEN SATURATION: 97 % | RESPIRATION RATE: 16 BRPM | BODY MASS INDEX: 29.47 KG/M2 | DIASTOLIC BLOOD PRESSURE: 70 MMHG

## 2019-11-21 DIAGNOSIS — E11.9 TYPE 2 DIABETES MELLITUS WITHOUT COMPLICATION, WITHOUT LONG-TERM CURRENT USE OF INSULIN (HCC): ICD-10-CM

## 2019-11-21 DIAGNOSIS — Z00.00 MEDICARE ANNUAL WELLNESS VISIT, SUBSEQUENT: Primary | ICD-10-CM

## 2019-11-21 DIAGNOSIS — I10 BENIGN ESSENTIAL HYPERTENSION: ICD-10-CM

## 2019-11-21 PROBLEM — M25.431 SWELLING OF JOINT, WRIST, RIGHT: Status: RESOLVED | Noted: 2017-02-21 | Resolved: 2019-11-21

## 2019-11-21 PROCEDURE — 99213 OFFICE O/P EST LOW 20 MIN: CPT | Performed by: FAMILY MEDICINE

## 2019-11-21 PROCEDURE — G0439 PPPS, SUBSEQ VISIT: HCPCS | Performed by: FAMILY MEDICINE

## 2019-11-21 NOTE — PROGRESS NOTES
Assessment and Plan:     Problem List Items Addressed This Visit     None      Visit Diagnoses     Need for pneumococcal vaccination    -  Primary    Need for influenza vaccination        Medicare annual wellness visit, subsequent               Preventive health issues were discussed with patient, and age appropriate screening tests were ordered as noted in patient's After Visit Summary  Personalized health advice and appropriate referrals for health education or preventive services given if needed, as noted in patient's After Visit Summary  History of Present Illness:     Patient presents for Medicare Annual Wellness visit    Patient Care Team:  Kris Vega MD as PCP - MD Annalisa Godoy RD (Nutrition)  Carina Gordillo MD as Endoscopist     Problem List:     Patient Active Problem List   Diagnosis    Swelling of joint, wrist, right    Rising PSA level    [facility]    Neoplasm of prostate, 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 ([facility]    Glaucoma    Benign essential hypertension    Colon cancer screening    Type 2 diabetes mellitus without complication ([facility] )    Welcome to Medicare preventive visit    Need for hepatitis C screening test      Past Medical and Surgical History:     Past Medical History:   Diagnosis Date    Diabetes mellitus ([facility]     Hx of radiation therapy     Hypertension     Malignant neoplasm of prostate ([facility]     Shingles 02/22/2018     Past Surgical History:   Procedure Laterality Date    COLONOSCOPY      9 years ago    AL COLONOSCOPY FLX DX W/COLLJ SPEC WHEN PFRMD N/A 6/25/2018    Procedure: COLONOSCOPY;  Surgeon: Carina Gordillo MD;  [facility];   Service: Gastroenterology      Family History:     Family History   Problem Relation Age of Onset    Diabetes Mother     Hypertension Mother     Heart disease Father         cardiac disorder    Diabetes Father     Hypertension Father     Prostate cancer Father     Diabetes Sister     Hypertension Sister     Multiple myeloma Brother       Social History:     Social History     Socioeconomic History    Marital status: Single     Spouse name: Not on file    Number of children: Not on file    Years of education: Not on file    Highest education level: Not on file   Occupational History    Occupation: retired     Comment:    Social Needs    Financial resource strain: Not on file    Food insecurity:     Worry: Not on file     Inability: Not on file    Transportation needs:     Medical: Not on file     Non-medical: Not on file   Tobacco Use    Smoking status: Former Smoker     Last attempt to quit: 1969     Years since quittin 9    Smokeless tobacco: Never Used   Substance and Sexual Activity    Alcohol use: No     Alcohol/week: 2 0 standard drinks     Types: 2 Cans of beer per week     Comment: socially    Drug use: No    Sexual activity: Not on file   Lifestyle    Physical activity:     Days per week: Not on file     Minutes per session: Not on file    Stress: Not on file   Relationships    Social connections:     Talks on phone: Not on file     Gets together: Not on file     Attends Protestant service: Not on file     Active member of club or organization: Not on file     Attends meetings of clubs or organizations: Not on file     Relationship status: Not on file    Intimate partner violence:     Fear of current or ex partner: Not on file     Emotionally abused: Not on file     Physically abused: Not on file     Forced sexual activity: Not on file   Other Topics Concern    Not on file   Social History Narrative    Two children       Medications and Allergies:     Current Outpatient Medications   Medication Sig Dispense Refill    amLODIPine (NORVASC) 10 mg tablet TAKE 1 TABLET BY MOUTH EVERY DAY 90 tablet 1    Coenzyme Q10 (COQ10) 100 MG CAPS Take by mouth      latanoprost (XALATAN) 0 005 % ophthalmic solution INSTILL 1 DROP INTO BOTH EYES AT BEDTIME  FILL ON 01-09-18  0    MEGARED OMEGA-3 KRILL  MG CAPS Take by mouth      metFORMIN (GLUCOPHAGE) 850 mg tablet Take 1 tablet (850 mg total) by mouth 2 (two) times a day 180 tablet 1    multivitamin (THERAGRAN) TABS Take 1 tablet by mouth daily      NON FORMULARY 2 (two) times a day CBD OIL      Saw Palmetto, Serenoa repens, 450 MG CAPS Take by mouth       No current facility-administered medications for this visit  Allergies   Allergen Reactions    Lactate Diarrhea      Immunizations: There is no immunization history on file for this patient  Health Maintenance:         Topic Date Due    CRC Screening: Colonoscopy  06/25/2023    Hepatitis C Screening  Completed         Topic Date Due    Pneumococcal Vaccine: 65+ Years (1 of 2 - PCV13) 01/10/2016    Influenza Vaccine  07/01/2019      Medicare Health Risk Assessment:     /70   Pulse 78   Resp 16   Ht 5' 9" (1 753 m)   Wt 90 3 kg (199 lb)   SpO2 97%   BMI 29 39 kg/m²      Leopold Milroy is here for his Subsequent Wellness visit  Health Risk Assessment:   Patient rates overall health as very good  Patient feels that their physical health rating is same  Eyesight was rated as same  Hearing was rated as same  Patient feels that their emotional and mental health rating is slightly better  Pain experienced in the last 7 days has been none  Depression Screening:   PHQ-2 Score: 0      Fall Risk Screening: In the past year, patient has experienced: no history of falling in past year      Home Safety:  Patient does not have trouble with stairs inside or outside of their home  Patient has working smoke alarms and has working carbon monoxide detector  Home safety hazards include: none  Nutrition:   Current diet is Regular, Low Carb and No Added Salt  Medications:   Patient is currently taking over-the-counter supplements   OTC medications include: see medication list  Patient is able to manage medications  Activities of Daily Living (ADLs)/Instrumental Activities of Daily Living (IADLs):   Walk and transfer into and out of bed and chair?: Yes  Dress and groom yourself?: Yes    Bathe or shower yourself?: Yes    Feed yourself? Yes  Do your laundry/housekeeping?: Yes  Manage your money, pay your bills and track your expenses?: Yes  Make your own meals?: Yes    Do your own shopping?: Yes    Previous Hospitalizations:   Any hospitalizations or ED visits within the last 12 months?: No      Advance Care Planning:   Living will: Yes    Durable POA for healthcare: Yes    Advanced directive: Yes      Cognitive Screening:   Provider or family/friend/caregiver concerned regarding cognition?: No    PREVENTIVE SCREENINGS      Cardiovascular Screening:    General: Screening Current      Diabetes Screening:     General: Screening Not Indicated and History Diabetes      Colorectal Cancer Screening:     General: Screening Current      Prostate Cancer Screening:    General: History Prostate Cancer      Osteoporosis Screening:    General: Screening Current      Abdominal Aortic Aneurysm (AAA) Screening:    Risk factors include: age between 73-69 yo and tobacco use        General: Screening Not Indicated      Lung Cancer Screening:     General: Screening Not Indicated      Hepatitis C Screening:    General: Screening Current    Other Counseling Topics:   Regular weightbearing exercise         Jn Sheridan MD

## 2019-11-21 NOTE — ASSESSMENT & PLAN NOTE
Lab Results   Component Value Date    HGBA1C 6 0 11/19/2019    Diabetes stable, he will continue taking metformin 1 tablet daily in the past as started him on twice a day but that gives him diarrhea he says he takes with heavy meal and then he is fine  Recheck labs in 4 months

## 2019-11-21 NOTE — PATIENT INSTRUCTIONS

## 2019-11-21 NOTE — PROGRESS NOTES
Assessment/Plan:    Problem List Items Addressed This Visit        Endocrine    Type 2 diabetes mellitus without complication ([facility] )       Lab Results   Component Value Date    HGBA1C 6 0 11/19/2019    Diabetes stable, he will continue taking metformin 1 tablet daily in the past as started him on twice a day but that gives him diarrhea he says he takes with heavy meal and then he is fine  Recheck labs in 4 months         Relevant Medications    metFORMIN (GLUCOPHAGE) 850 mg tablet    Other Relevant Orders    CBC and differential    Comprehensive metabolic panel    Hemoglobin A1C       Cardiovascular and Mediastinum    Benign essential hypertension     Stable blood pressure, continue same medication continue low-salt diet            Other    Medicare annual wellness visit, subsequent - Primary          Chief Complaint   Patient presents with    Medicare Wellness Visit    Follow-up       Subjective:   Patient ID: Rachel Chaves is a 77 y o  male  Says he has been taking metformin only once a day for last 1 month and he feels better, he says if he takes twice a day then he gets diarrhea so he wants to try once a day if is possible, he had his recent labs and his A1c is 6 0,  He has been eating better, his prostate cancer is under stable condition, he follows urologist   He takes his blood pressure medicine and blood pressure is stable      Review of Systems   Constitutional: Negative for activity change, appetite change, chills, diaphoresis, fatigue, fever and unexpected weight change  HENT: Negative for congestion, dental problem, ear discharge, ear pain, facial swelling, hearing loss, mouth sores, nosebleeds, postnasal drip, rhinorrhea, sinus pressure, sinus pain, sneezing, sore throat, trouble swallowing and voice change  Eyes: Negative for photophobia, pain, discharge, redness and itching  Respiratory: Negative for cough, chest tightness, shortness of breath and wheezing      Cardiovascular: Negative for chest pain, palpitations and leg swelling  Gastrointestinal: Negative for abdominal distention, abdominal pain, blood in stool, constipation, diarrhea and nausea  Endocrine: Negative for cold intolerance, heat intolerance, polydipsia, polyphagia and polyuria  Genitourinary: Negative for dysuria, flank pain, frequency, hematuria and urgency  Musculoskeletal: Negative for arthralgias, back pain, myalgias and neck pain  Skin: Negative for color change and pallor  Allergic/Immunologic: Negative for environmental allergies and food allergies  Neurological: Negative for dizziness, weakness, light-headedness, numbness and headaches  Hematological: Negative for adenopathy  Does not bruise/bleed easily  Psychiatric/Behavioral: Negative for behavioral problems, sleep disturbance and suicidal ideas  The patient is not nervous/anxious  Objective:  Physical Exam   Constitutional: He is oriented to person, place, and time  He appears well-developed and well-nourished  HENT:   Head: Normocephalic and atraumatic  Right Ear: External ear normal    Left Ear: External ear normal    Nose: Nose normal    Mouth/Throat: Oropharynx is clear and moist  No oropharyngeal exudate  Eyes: Pupils are equal, round, and reactive to light  Conjunctivae and EOM are normal  Right eye exhibits no discharge  Left eye exhibits no discharge  No scleral icterus  Neck: Normal range of motion  Neck supple  No tracheal deviation present  No thyromegaly present  Cardiovascular: Normal rate, regular rhythm and normal heart sounds  No murmur heard  Pulmonary/Chest: Effort normal and breath sounds normal  No respiratory distress  He has no wheezes  He has no rales  Abdominal: Soft  Bowel sounds are normal  He exhibits no distension and no mass  There is no tenderness  There is no rebound  Musculoskeletal: Normal range of motion  He exhibits no edema  Lymphadenopathy:     He has no cervical adenopathy     Neurological: He is alert and oriented to person, place, and time  He has normal reflexes  No cranial nerve deficit  Skin: Skin is warm  No rash noted  No erythema  No pallor  Psychiatric: He has a normal mood and affect  His behavior is normal  Judgment and thought content normal    Nursing note and vitals reviewed  Past Surgical History:   Procedure Laterality Date    COLONOSCOPY      9 years ago    MA COLONOSCOPY FLX DX W/COLLJ SPEC WHEN PFRMD N/A 6/25/2018    Procedure: COLONOSCOPY;  Surgeon: Mariajose Hickman MD;  [facility]; Service: Gastroenterology       Family History   Problem Relation Age of Onset    Diabetes Mother     Hypertension Mother     Heart disease Father         cardiac disorder    Diabetes Father     Hypertension Father     Prostate cancer Father     Diabetes Sister     Hypertension Sister     Multiple myeloma Brother          Current Outpatient Medications:     amLODIPine (NORVASC) 10 mg tablet, TAKE 1 TABLET BY MOUTH EVERY DAY, Disp: 90 tablet, Rfl: 1    Coenzyme Q10 (COQ10) 100 MG CAPS, Take by mouth, Disp: , Rfl:     latanoprost (XALATAN) 0 005 % ophthalmic solution, INSTILL 1 DROP INTO BOTH EYES AT BEDTIME   FILL ON 01-09-18, Disp: , Rfl: 0    MEGARED OMEGA-3 KRILL  MG CAPS, Take by mouth, Disp: , Rfl:     metFORMIN (GLUCOPHAGE) 850 mg tablet, Take 1 tab daily with dinner, Disp: 90 tablet, Rfl: 1    multivitamin (THERAGRAN) TABS, Take 1 tablet by mouth daily, Disp: , Rfl:     NON FORMULARY, 2 (two) times a day CBD OIL, Disp: , Rfl:     Saw Palmetto, Serenoa repens, 450 MG CAPS, Take by mouth, Disp: , Rfl:     Allergies   Allergen Reactions    Lactate Diarrhea       Vitals:    11/21/19 0903   BP: 130/70   Pulse: 78   Resp: 16   SpO2: 97%   Weight: 90 3 kg (199 lb)   Height: 5' 9" (1 753 m)

## 2020-02-28 DIAGNOSIS — I10 ESSENTIAL HYPERTENSION: ICD-10-CM

## 2020-03-02 RX ORDER — AMLODIPINE BESYLATE 10 MG/1
TABLET ORAL
Qty: 90 TABLET | Refills: 1 | Status: SHIPPED | OUTPATIENT
Start: 2020-03-02 | End: 2020-09-01

## 2020-04-17 ENCOUNTER — TELEPHONE (OUTPATIENT)
Dept: UROLOGY | Facility: CLINIC | Age: 70
End: 2020-04-17

## 2020-04-17 DIAGNOSIS — C61 PROSTATE CANCER (HCC): Primary | ICD-10-CM

## 2020-04-22 ENCOUNTER — LAB (OUTPATIENT)
Dept: LAB | Facility: CLINIC | Age: 70
End: 2020-04-22
Payer: MEDICARE

## 2020-04-22 DIAGNOSIS — C61 PROSTATE CANCER (HCC): ICD-10-CM

## 2020-04-22 DIAGNOSIS — E11.9 TYPE 2 DIABETES MELLITUS WITHOUT COMPLICATION, WITHOUT LONG-TERM CURRENT USE OF INSULIN (HCC): ICD-10-CM

## 2020-04-22 LAB
ALBUMIN SERPL BCP-MCNC: 3.9 G/DL (ref 3.5–5)
ALP SERPL-CCNC: 111 U/L (ref 46–116)
ALT SERPL W P-5'-P-CCNC: 21 U/L (ref 12–78)
ANION GAP SERPL CALCULATED.3IONS-SCNC: 6 MMOL/L (ref 4–13)
AST SERPL W P-5'-P-CCNC: 25 U/L (ref 5–45)
BASOPHILS # BLD AUTO: 0.03 THOUSANDS/ΜL (ref 0–0.1)
BASOPHILS NFR BLD AUTO: 1 % (ref 0–1)
BILIRUB SERPL-MCNC: 0.62 MG/DL (ref 0.2–1)
BUN SERPL-MCNC: 14 MG/DL (ref 5–25)
CALCIUM SERPL-MCNC: 9.1 MG/DL (ref 8.3–10.1)
CHLORIDE SERPL-SCNC: 105 MMOL/L (ref 100–108)
CO2 SERPL-SCNC: 30 MMOL/L (ref 21–32)
CREAT SERPL-MCNC: 0.81 MG/DL (ref 0.6–1.3)
EOSINOPHIL # BLD AUTO: 0.08 THOUSAND/ΜL (ref 0–0.61)
EOSINOPHIL NFR BLD AUTO: 2 % (ref 0–6)
ERYTHROCYTE [DISTWIDTH] IN BLOOD BY AUTOMATED COUNT: 17.8 % (ref 11.6–15.1)
EST. AVERAGE GLUCOSE BLD GHB EST-MCNC: 117 MG/DL
GFR SERPL CREATININE-BSD FRML MDRD: 105 ML/MIN/1.73SQ M
GLUCOSE P FAST SERPL-MCNC: 92 MG/DL (ref 65–99)
HBA1C MFR BLD: 5.7 %
HCT VFR BLD AUTO: 46.6 % (ref 36.5–49.3)
HGB BLD-MCNC: 14.1 G/DL (ref 12–17)
IMM GRANULOCYTES # BLD AUTO: 0.01 THOUSAND/UL (ref 0–0.2)
IMM GRANULOCYTES NFR BLD AUTO: 0 % (ref 0–2)
LYMPHOCYTES # BLD AUTO: 1.68 THOUSANDS/ΜL (ref 0.6–4.47)
LYMPHOCYTES NFR BLD AUTO: 36 % (ref 14–44)
MCH RBC QN AUTO: 24.4 PG (ref 26.8–34.3)
MCHC RBC AUTO-ENTMCNC: 30.3 G/DL (ref 31.4–37.4)
MCV RBC AUTO: 81 FL (ref 82–98)
MONOCYTES # BLD AUTO: 0.48 THOUSAND/ΜL (ref 0.17–1.22)
MONOCYTES NFR BLD AUTO: 10 % (ref 4–12)
NEUTROPHILS # BLD AUTO: 2.33 THOUSANDS/ΜL (ref 1.85–7.62)
NEUTS SEG NFR BLD AUTO: 51 % (ref 43–75)
NRBC BLD AUTO-RTO: 0 /100 WBCS
PLATELET # BLD AUTO: 203 THOUSANDS/UL (ref 149–390)
PMV BLD AUTO: 12 FL (ref 8.9–12.7)
POTASSIUM SERPL-SCNC: 4.6 MMOL/L (ref 3.5–5.3)
PROT SERPL-MCNC: 7.6 G/DL (ref 6.4–8.2)
PSA SERPL-MCNC: 6 NG/ML (ref 0–4)
RBC # BLD AUTO: 5.79 MILLION/UL (ref 3.88–5.62)
SODIUM SERPL-SCNC: 141 MMOL/L (ref 136–145)
WBC # BLD AUTO: 4.61 THOUSAND/UL (ref 4.31–10.16)

## 2020-04-22 PROCEDURE — 84153 ASSAY OF PSA TOTAL: CPT

## 2020-04-22 PROCEDURE — 85025 COMPLETE CBC W/AUTO DIFF WBC: CPT

## 2020-04-22 PROCEDURE — 83036 HEMOGLOBIN GLYCOSYLATED A1C: CPT

## 2020-04-22 PROCEDURE — 36415 COLL VENOUS BLD VENIPUNCTURE: CPT

## 2020-04-22 PROCEDURE — 80053 COMPREHEN METABOLIC PANEL: CPT

## 2020-04-23 ENCOUNTER — TELEMEDICINE (OUTPATIENT)
Dept: FAMILY MEDICINE CLINIC | Facility: CLINIC | Age: 70
End: 2020-04-23
Payer: MEDICARE

## 2020-04-23 DIAGNOSIS — R97.20 RISING PSA LEVEL: ICD-10-CM

## 2020-04-23 DIAGNOSIS — E11.9 TYPE 2 DIABETES MELLITUS WITHOUT COMPLICATION, WITHOUT LONG-TERM CURRENT USE OF INSULIN (HCC): Primary | ICD-10-CM

## 2020-04-23 DIAGNOSIS — I10 BENIGN ESSENTIAL HYPERTENSION: ICD-10-CM

## 2020-04-23 PROBLEM — Z11.59 NEED FOR HEPATITIS C SCREENING TEST: Status: RESOLVED | Noted: 2019-08-15 | Resolved: 2020-04-23

## 2020-04-23 PROCEDURE — 99214 OFFICE O/P EST MOD 30 MIN: CPT | Performed by: FAMILY MEDICINE

## 2020-04-24 ENCOUNTER — TELEMEDICINE (OUTPATIENT)
Dept: UROLOGY | Facility: CLINIC | Age: 70
End: 2020-04-24
Payer: MEDICARE

## 2020-04-24 ENCOUNTER — TELEPHONE (OUTPATIENT)
Dept: UROLOGY | Facility: CLINIC | Age: 70
End: 2020-04-24

## 2020-04-24 DIAGNOSIS — C61 PROSTATE CANCER (HCC): Primary | ICD-10-CM

## 2020-04-24 PROCEDURE — 99213 OFFICE O/P EST LOW 20 MIN: CPT | Performed by: PHYSICIAN ASSISTANT

## 2020-05-01 ENCOUNTER — PROCEDURE VISIT (OUTPATIENT)
Dept: UROLOGY | Facility: CLINIC | Age: 70
End: 2020-05-01
Payer: MEDICARE

## 2020-05-01 VITALS
HEIGHT: 69 IN | BODY MASS INDEX: 29.18 KG/M2 | SYSTOLIC BLOOD PRESSURE: 142 MMHG | RESPIRATION RATE: 18 BRPM | DIASTOLIC BLOOD PRESSURE: 78 MMHG | WEIGHT: 197 LBS

## 2020-05-01 DIAGNOSIS — C61 PROSTATE CANCER (HCC): Primary | ICD-10-CM

## 2020-05-01 PROCEDURE — 96402 CHEMO HORMON ANTINEOPL SQ/IM: CPT | Performed by: UROLOGY

## 2020-06-08 DIAGNOSIS — E11.9 TYPE 2 DIABETES MELLITUS WITHOUT COMPLICATION, WITHOUT LONG-TERM CURRENT USE OF INSULIN (HCC): ICD-10-CM

## 2020-07-20 ENCOUNTER — APPOINTMENT (OUTPATIENT)
Dept: LAB | Facility: CLINIC | Age: 70
End: 2020-07-20
Payer: MEDICARE

## 2020-07-20 DIAGNOSIS — I10 BENIGN ESSENTIAL HYPERTENSION: ICD-10-CM

## 2020-07-20 DIAGNOSIS — E11.9 TYPE 2 DIABETES MELLITUS WITHOUT COMPLICATION, WITHOUT LONG-TERM CURRENT USE OF INSULIN (HCC): ICD-10-CM

## 2020-07-20 LAB
ALBUMIN SERPL BCP-MCNC: 4.1 G/DL (ref 3.5–5)
ALP SERPL-CCNC: 95 U/L (ref 46–116)
ALT SERPL W P-5'-P-CCNC: 25 U/L (ref 12–78)
ANION GAP SERPL CALCULATED.3IONS-SCNC: 7 MMOL/L (ref 4–13)
AST SERPL W P-5'-P-CCNC: 23 U/L (ref 5–45)
BASOPHILS # BLD AUTO: 0.03 THOUSANDS/ΜL (ref 0–0.1)
BASOPHILS NFR BLD AUTO: 1 % (ref 0–1)
BILIRUB SERPL-MCNC: 0.62 MG/DL (ref 0.2–1)
BUN SERPL-MCNC: 17 MG/DL (ref 5–25)
CALCIUM ALBUM COR SERPL-MCNC: 10.1 MG/DL (ref 8.3–10.1)
CALCIUM SERPL-MCNC: 10.2 MG/DL (ref 8.3–10.1)
CHLORIDE SERPL-SCNC: 104 MMOL/L (ref 100–108)
CHOLEST SERPL-MCNC: 221 MG/DL (ref 50–200)
CO2 SERPL-SCNC: 28 MMOL/L (ref 21–32)
CREAT SERPL-MCNC: 0.97 MG/DL (ref 0.6–1.3)
CREAT UR-MCNC: 99.9 MG/DL
EOSINOPHIL # BLD AUTO: 0.06 THOUSAND/ΜL (ref 0–0.61)
EOSINOPHIL NFR BLD AUTO: 1 % (ref 0–6)
ERYTHROCYTE [DISTWIDTH] IN BLOOD BY AUTOMATED COUNT: 16.1 % (ref 11.6–15.1)
EST. AVERAGE GLUCOSE BLD GHB EST-MCNC: 131 MG/DL
GFR SERPL CREATININE-BSD FRML MDRD: 92 ML/MIN/1.73SQ M
GLUCOSE P FAST SERPL-MCNC: 88 MG/DL (ref 65–99)
HBA1C MFR BLD: 6.2 %
HCT VFR BLD AUTO: 44.8 % (ref 36.5–49.3)
HDLC SERPL-MCNC: 92 MG/DL
HGB BLD-MCNC: 13.9 G/DL (ref 12–17)
IMM GRANULOCYTES # BLD AUTO: 0.01 THOUSAND/UL (ref 0–0.2)
IMM GRANULOCYTES NFR BLD AUTO: 0 % (ref 0–2)
LDLC SERPL CALC-MCNC: 115 MG/DL (ref 0–100)
LYMPHOCYTES # BLD AUTO: 1.81 THOUSANDS/ΜL (ref 0.6–4.47)
LYMPHOCYTES NFR BLD AUTO: 35 % (ref 14–44)
MCH RBC QN AUTO: 24.6 PG (ref 26.8–34.3)
MCHC RBC AUTO-ENTMCNC: 31 G/DL (ref 31.4–37.4)
MCV RBC AUTO: 79 FL (ref 82–98)
MICROALBUMIN UR-MCNC: 5.5 MG/L (ref 0–20)
MICROALBUMIN/CREAT 24H UR: 6 MG/G CREATININE (ref 0–30)
MONOCYTES # BLD AUTO: 0.6 THOUSAND/ΜL (ref 0.17–1.22)
MONOCYTES NFR BLD AUTO: 12 % (ref 4–12)
NEUTROPHILS # BLD AUTO: 2.68 THOUSANDS/ΜL (ref 1.85–7.62)
NEUTS SEG NFR BLD AUTO: 51 % (ref 43–75)
NONHDLC SERPL-MCNC: 129 MG/DL
NRBC BLD AUTO-RTO: 0 /100 WBCS
PLATELET # BLD AUTO: 206 THOUSANDS/UL (ref 149–390)
PMV BLD AUTO: 11.9 FL (ref 8.9–12.7)
POTASSIUM SERPL-SCNC: 4.6 MMOL/L (ref 3.5–5.3)
PROT SERPL-MCNC: 7.5 G/DL (ref 6.4–8.2)
RBC # BLD AUTO: 5.66 MILLION/UL (ref 3.88–5.62)
SODIUM SERPL-SCNC: 139 MMOL/L (ref 136–145)
TRIGL SERPL-MCNC: 70 MG/DL
TSH SERPL DL<=0.05 MIU/L-ACNC: 0.93 UIU/ML (ref 0.36–3.74)
WBC # BLD AUTO: 5.19 THOUSAND/UL (ref 4.31–10.16)

## 2020-07-20 PROCEDURE — 84443 ASSAY THYROID STIM HORMONE: CPT

## 2020-07-20 PROCEDURE — 83036 HEMOGLOBIN GLYCOSYLATED A1C: CPT

## 2020-07-20 PROCEDURE — 85025 COMPLETE CBC W/AUTO DIFF WBC: CPT

## 2020-07-20 PROCEDURE — 36415 COLL VENOUS BLD VENIPUNCTURE: CPT

## 2020-07-20 PROCEDURE — 82043 UR ALBUMIN QUANTITATIVE: CPT | Performed by: FAMILY MEDICINE

## 2020-07-20 PROCEDURE — 82570 ASSAY OF URINE CREATININE: CPT | Performed by: FAMILY MEDICINE

## 2020-07-20 PROCEDURE — 80053 COMPREHEN METABOLIC PANEL: CPT

## 2020-07-20 PROCEDURE — 80061 LIPID PANEL: CPT

## 2020-07-23 ENCOUNTER — OFFICE VISIT (OUTPATIENT)
Dept: FAMILY MEDICINE CLINIC | Facility: CLINIC | Age: 70
End: 2020-07-23
Payer: MEDICARE

## 2020-07-23 VITALS
TEMPERATURE: 97.6 F | RESPIRATION RATE: 16 BRPM | HEART RATE: 80 BPM | WEIGHT: 196 LBS | OXYGEN SATURATION: 98 % | SYSTOLIC BLOOD PRESSURE: 132 MMHG | BODY MASS INDEX: 29.03 KG/M2 | HEIGHT: 69 IN | DIASTOLIC BLOOD PRESSURE: 68 MMHG

## 2020-07-23 DIAGNOSIS — C79.51 NEOPLASM OF PROSTATE, DISTANT METASTASIS STAGING CATEGORY M1B: METASTASIS TO BONE (HCC): ICD-10-CM

## 2020-07-23 DIAGNOSIS — R97.20 RISING PSA LEVEL: ICD-10-CM

## 2020-07-23 DIAGNOSIS — H40.9 GLAUCOMA OF BOTH EYES, UNSPECIFIED GLAUCOMA TYPE: ICD-10-CM

## 2020-07-23 DIAGNOSIS — E11.9 TYPE 2 DIABETES MELLITUS WITHOUT COMPLICATION, WITHOUT LONG-TERM CURRENT USE OF INSULIN (HCC): Primary | ICD-10-CM

## 2020-07-23 DIAGNOSIS — C61 NEOPLASM OF PROSTATE, DISTANT METASTASIS STAGING CATEGORY M1B: METASTASIS TO BONE (HCC): ICD-10-CM

## 2020-07-23 DIAGNOSIS — I10 BENIGN ESSENTIAL HYPERTENSION: ICD-10-CM

## 2020-07-23 PROCEDURE — 3044F HG A1C LEVEL LT 7.0%: CPT | Performed by: FAMILY MEDICINE

## 2020-07-23 PROCEDURE — 3075F SYST BP GE 130 - 139MM HG: CPT | Performed by: FAMILY MEDICINE

## 2020-07-23 PROCEDURE — 3008F BODY MASS INDEX DOCD: CPT | Performed by: FAMILY MEDICINE

## 2020-07-23 PROCEDURE — 2022F DILAT RTA XM EVC RTNOPTHY: CPT | Performed by: FAMILY MEDICINE

## 2020-07-23 PROCEDURE — 99214 OFFICE O/P EST MOD 30 MIN: CPT | Performed by: FAMILY MEDICINE

## 2020-07-23 PROCEDURE — 1036F TOBACCO NON-USER: CPT | Performed by: FAMILY MEDICINE

## 2020-07-23 PROCEDURE — 3078F DIAST BP <80 MM HG: CPT | Performed by: FAMILY MEDICINE

## 2020-07-23 PROCEDURE — 1160F RVW MEDS BY RX/DR IN RCRD: CPT | Performed by: FAMILY MEDICINE

## 2020-07-23 NOTE — ASSESSMENT & PLAN NOTE
Lab Results   Component Value Date    HGBA1C 6 2 (H) 07/20/2020   He will continue low carb and low-fat diet to improve his blood sugar, continue metformin at dinnertime and he will do more exercise

## 2020-07-23 NOTE — PROGRESS NOTES
Assessment/Plan:    Problem List Items Addressed This Visit        Endocrine    Type 2 diabetes mellitus without complication ([facility] ) - Primary       Lab Results   Component Value Date    HGBA1C 6 2 (H) 07/20/2020   He will continue low carb and low-fat diet to improve his blood sugar, continue metformin at dinnertime and he will do more exercise         Relevant Orders     Diabetes Eye Exam (Completed)    Ambulatory referral to Ophthalmology    CBC and differential    Comprehensive metabolic panel    Hemoglobin A1C    Lipid panel       Cardiovascular and Mediastinum    Benign essential hypertension     Hypertension is stable, he will continue amlodipine and low-salt diet            Genitourinary    Neoplasm of prostate, 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 [facility])  Follows urologist and being treated       Other    Rising PSA level     He started his Lupron treatment again as his PSA was rising         Glaucoma     He is due for his eye exam and will need a referral         Relevant Orders    Ambulatory referral to Ophthalmology      Advised to see the pulmonologist as he is diabetic and he has glaucoma,  He does not want to have vaccination for pneumonia  Repeat labs in 4 months and follow-up    Chief Complaint   Patient presents with    Follow-up       Subjective:   Patient ID: Rochelle Crews is a 72 y o  male  Follow-up on diabetes, he only takes 1 metformin at dinnertime, he says he tries to eat healthy but he is not exercising much, he has been following urologist and prostate cancer is being managed and he started Lupron injections again since April and due to that he get some hot flashes, his PSA was elevated,  He denies any headache chest pain shortness of breath or abdominal pain    He has glaucoma, and he is due for his eye exam   For hypertension he takes amlodipine      Review of Systems   Constitutional: Negative for activity change, appetite change, chills, diaphoresis, fatigue, fever and unexpected weight change  HENT: Negative for congestion, dental problem, ear discharge, ear pain, facial swelling, hearing loss, mouth sores, nosebleeds, postnasal drip, rhinorrhea, sinus pressure, sinus pain, sneezing, sore throat, trouble swallowing and voice change  Eyes: Negative for photophobia, pain, discharge, redness and itching  Respiratory: Negative for cough, chest tightness, shortness of breath and wheezing  Cardiovascular: Negative for chest pain, palpitations and leg swelling  Gastrointestinal: Negative for abdominal distention, abdominal pain, blood in stool, constipation, diarrhea and nausea  Endocrine: Negative for cold intolerance, heat intolerance, polydipsia, polyphagia and polyuria  Genitourinary: Negative for dysuria, flank pain, frequency, hematuria and urgency  Musculoskeletal: Negative for arthralgias, back pain, myalgias and neck pain  Skin: Negative for color change and pallor  Allergic/Immunologic: Negative for environmental allergies and food allergies  Neurological: Negative for dizziness, weakness, light-headedness, numbness and headaches  Hematological: Negative for adenopathy  Does not bruise/bleed easily  Psychiatric/Behavioral: Negative for behavioral problems, sleep disturbance and suicidal ideas  The patient is not nervous/anxious  Objective:  Physical Exam   Constitutional: He is oriented to person, place, and time  He appears well-developed and well-nourished  HENT:   Head: Normocephalic and atraumatic  Eyes: Conjunctivae and EOM are normal  Right eye exhibits no discharge  Left eye exhibits no discharge  No scleral icterus  Neck: Normal range of motion  Neck supple  No JVD present  No tracheal deviation present  No thyromegaly present  Cardiovascular: Normal rate, regular rhythm and normal heart sounds  No murmur heard  Pulmonary/Chest: Effort normal and breath sounds normal  No respiratory distress  He has no wheezes  He has no rales  Abdominal: Soft  Bowel sounds are normal  He exhibits no distension and no mass  There is no tenderness  There is no rebound and no guarding  Musculoskeletal: Normal range of motion  He exhibits no edema or deformity  Lymphadenopathy:     He has no cervical adenopathy  Neurological: He is alert and oriented to person, place, and time  He has normal reflexes  No cranial nerve deficit  Skin: Skin is warm  No rash noted  No erythema  No pallor  Psychiatric: He has a normal mood and affect  His behavior is normal    Nursing note and vitals reviewed  Past Surgical History:   Procedure Laterality Date    COLONOSCOPY      9 years ago    [place] COLONOSCOPY FLX DX W/COLLJ SPEC WHEN PFRMD N/A 6/25/2018    Procedure: COLONOSCOPY;  Surgeon: Alise Irwin MD;  [facility]; Service: Gastroenterology       Family History   Problem Relation Age of Onset    Diabetes Mother     Hypertension Mother     Heart disease Father         cardiac disorder    Diabetes Father     Hypertension Father     Prostate cancer Father     Diabetes Sister     Hypertension Sister     Multiple myeloma Brother          Current Outpatient Medications:     amLODIPine (NORVASC) 10 mg tablet, TAKE 1 TABLET BY MOUTH EVERY DAY, Disp: 90 tablet, Rfl: 1    calcium carbonate-vitamin D (OSCAL-D) 500 mg-200 units per tablet, Take 1 tablet by mouth daily, Disp: , Rfl:     Coenzyme Q10 (COQ10) 100 MG CAPS, Take by mouth, Disp: , Rfl:     latanoprost (XALATAN) 0 005 % ophthalmic solution, INSTILL 1 DROP INTO BOTH EYES AT BEDTIME   FILL ON 01-09-18, Disp: , Rfl: 0    MEGARED OMEGA-3 KRILL  MG CAPS, Take by mouth, Disp: , Rfl:     metFORMIN (GLUCOPHAGE) 850 mg tablet, TAKE 1 TAB DAILY WITH DINNER, Disp: 90 tablet, Rfl: 1    multivitamin (THERAGRAN) TABS, Take 1 tablet by mouth daily, Disp: , Rfl:     NADH POWD, Take 250 mg by mouth, Disp: , Rfl:     NON FORMULARY, 2 (two) times a day CBD OIL, Disp: , Rfl:     Saw Palmetto, Serenoa repens, 450 MG CAPS, Take by mouth, Disp: , Rfl:     Allergies   Allergen Reactions    Lactate Diarrhea       Vitals:    07/23/20 0941 07/23/20 1005   BP: (!) 132/6 132/68   Pulse: 80    Resp: 16    Temp: 97 6 °F (36 4 °C)    TempSrc: Tympanic    SpO2: 98%    Weight: 88 9 kg (196 lb)    Height: 5' 9" (1 753 m)

## 2020-08-27 ENCOUNTER — APPOINTMENT (OUTPATIENT)
Dept: LAB | Facility: CLINIC | Age: 70
End: 2020-08-27
Payer: MEDICARE

## 2020-08-27 DIAGNOSIS — C61 PROSTATE CANCER (HCC): ICD-10-CM

## 2020-08-27 LAB — PSA SERPL-MCNC: 0.9 NG/ML (ref 0–4)

## 2020-08-27 PROCEDURE — 84153 ASSAY OF PSA TOTAL: CPT

## 2020-08-31 ENCOUNTER — OFFICE VISIT (OUTPATIENT)
Dept: UROLOGY | Facility: CLINIC | Age: 70
End: 2020-08-31
Payer: MEDICARE

## 2020-08-31 VITALS
SYSTOLIC BLOOD PRESSURE: 138 MMHG | HEIGHT: 69 IN | WEIGHT: 193 LBS | BODY MASS INDEX: 28.58 KG/M2 | DIASTOLIC BLOOD PRESSURE: 86 MMHG | HEART RATE: 67 BPM | TEMPERATURE: 97.3 F

## 2020-08-31 DIAGNOSIS — C61 PROSTATE CANCER (HCC): Primary | ICD-10-CM

## 2020-08-31 PROCEDURE — 1036F TOBACCO NON-USER: CPT | Performed by: PHYSICIAN ASSISTANT

## 2020-08-31 PROCEDURE — 2022F DILAT RTA XM EVC RTNOPTHY: CPT | Performed by: PHYSICIAN ASSISTANT

## 2020-08-31 PROCEDURE — 99213 OFFICE O/P EST LOW 20 MIN: CPT | Performed by: PHYSICIAN ASSISTANT

## 2020-08-31 PROCEDURE — 3044F HG A1C LEVEL LT 7.0%: CPT | Performed by: PHYSICIAN ASSISTANT

## 2020-08-31 PROCEDURE — 3079F DIAST BP 80-89 MM HG: CPT | Performed by: PHYSICIAN ASSISTANT

## 2020-08-31 PROCEDURE — 3008F BODY MASS INDEX DOCD: CPT | Performed by: PHYSICIAN ASSISTANT

## 2020-08-31 PROCEDURE — 3075F SYST BP GE 130 - 139MM HG: CPT | Performed by: PHYSICIAN ASSISTANT

## 2020-08-31 PROCEDURE — 1160F RVW MEDS BY RX/DR IN RCRD: CPT | Performed by: PHYSICIAN ASSISTANT

## 2020-08-31 NOTE — PROGRESS NOTES
1  Prostate cancer ([facility] [address] )  PSA Total, Diagnostic       Assessment and plan:       1  Metastatic prostate cancer - managed by Dr Andres Grullon  - last Lupron 5/1/2020, patient electing for intermittent ADT due to sexual function concerns  He understands the risks of progression and castrate resistance  - castrate sensitive at this time with a PSA of 0 9  - will follow up in 3 months with PSA prior to visit for continued surveillance  All questions answered  Alejandro Jimenes PA-C      Chief Complaint     Chief Complaint   Patient presents with    Prostate Cancer       History of Present Illness     Cyrena Merlin is a 72 y o   male  patient of Dr Andres Grullon with a history of metastatic prostate cancer presenting for follow-up      Patient has a history of prostate cancer and had previously undergone radiation therapy  He was found to have a rising PSA after radiation and was subsequently diagnosed with metastatic prostate cancer  Patient was managed on androgen deprivation therapy however elected to discontinue it due to the sexual side effects that he was experiencing  Patient was last seen virtually with a have a progression of PSA up to 6 0 (4/22/2020)  He was counseled on androgen deprivation therapy at that time and was amenable  6 month lupron depot provided 5/1/2020  Most recent PSA 0 9 (8/27/2020)  Patient does notice of progressive erectile dysfunction again since free initiating his androgen deprivation therapy  Denies any bone pain, weight loss, or urinary difficulties  Urinary Incontinence Screening      Most Recent Value   Urinary Incontinence   Urinary Incontinence? No   Incomplete emptying? No   Urinary frequency? No   Urinary urgency? No   Urinary hesitancy? No   Dysuria (painful difficult urination)? No   Nocturia (waking up to use the bathroom)? Yes [x3]   Straining (having to push to go)? No   Weak stream?  No   Intermittent stream?  No   Post void dribbling?   No Laboratory     Lab Results   Component Value Date    CREATININE 0 97 07/20/2020       Lab Results   Component Value Date    PSA 0 9 08/27/2020    PSA 6 0 (H) 04/22/2020    PSA 3 5 11/19/2019       Review of Systems     Review of Systems   Constitutional: Negative for activity change, appetite change, chills, diaphoresis, fatigue, fever and unexpected weight change  Respiratory: Negative for chest tightness and shortness of breath  Cardiovascular: Negative for chest pain, palpitations and leg swelling  Gastrointestinal: Negative for abdominal distention, abdominal pain, constipation, diarrhea, nausea and vomiting  Genitourinary: Negative for decreased urine volume, difficulty urinating, dysuria, enuresis, flank pain, frequency, genital sores, hematuria and urgency  Musculoskeletal: Negative for back pain, gait problem and myalgias  Skin: Negative for color change, pallor, rash and wound  Psychiatric/Behavioral: Negative for behavioral problems  The patient is not nervous/anxious  Allergies     Allergies   Allergen Reactions    Lactate Diarrhea       Physical Exam     Physical Exam  Constitutional:       General: He is not in acute distress  Appearance: Normal appearance  He is normal weight  He is not ill-appearing, toxic-appearing or diaphoretic  HENT:      Head: Normocephalic and atraumatic  Eyes:      General:         Right eye: No discharge  Left eye: No discharge  Conjunctiva/sclera: Conjunctivae normal    Pulmonary:      Effort: Pulmonary effort is normal  No respiratory distress  Musculoskeletal: Normal range of motion  Skin:     General: Skin is warm and dry  Coloration: Skin is not jaundiced or pale  Neurological:      General: No focal deficit present  Mental Status: He is alert and oriented to person, place, and time     Psychiatric:         Mood and Affect: Mood normal          Behavior: Behavior normal          Thought Content: Thought content normal          Judgment: Judgment normal            Vital Signs     Vitals:    08/31/20 1339   BP: 138/86   BP Location: Left arm   Patient Position: Sitting   Cuff Size: Standard   Pulse: 67   Temp: (!) 97 3 °F (36 3 °C)   TempSrc: Temporal   Weight: 87 5 kg (193 lb)   Height: 5' 9" (1 753 m)         Current Medications       Current Outpatient Medications:     amLODIPine (NORVASC) 10 mg tablet, TAKE 1 TABLET BY MOUTH EVERY DAY, Disp: 90 tablet, Rfl: 1    calcium carbonate-vitamin D (OSCAL-D) 500 mg-200 units per tablet, Take 1 tablet by mouth daily, Disp: , Rfl:     Coenzyme Q10 (COQ10) 100 MG CAPS, Take by mouth, Disp: , Rfl:     Cyanocobalamin (B-12 PO), Take by mouth, Disp: , Rfl:     latanoprost (XALATAN) 0 005 % ophthalmic solution, INSTILL 1 DROP INTO BOTH EYES AT BEDTIME   FILL ON 01-09-18, Disp: , Rfl: 0    MEGARED OMEGA-3 KRILL  MG CAPS, Take by mouth, Disp: , Rfl:     metFORMIN (GLUCOPHAGE) 850 mg tablet, TAKE 1 TAB DAILY WITH DINNER, Disp: 90 tablet, Rfl: 1    multivitamin (THERAGRAN) TABS, Take 1 tablet by mouth daily, Disp: , Rfl:     NADH POWD, Take 250 mg by mouth, Disp: , Rfl:     NON FORMULARY, 2 (two) times a day CBD OIL, Disp: , Rfl:     Saw Palmetto, Serenoa repens, 450 MG CAPS, Take by mouth, Disp: , Rfl:       Active Problems     Patient Active Problem List   Diagnosis    Rising PSA level    [facility]    Neoplasm of prostate, 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 ([facility]    Glaucoma    Benign essential hypertension    Colon cancer screening    Type 2 diabetes mellitus without complication [facility]    Medicare annual wellness visit, subsequent         Past Medical History     Past Medical History:   Diagnosis Date    [facility]     Hx of radiation therapy     Hypertension     [facility]     Shingles 02/22/2018         Surgical History     Past Surgical History:   Procedure Laterality Date    COLONOSCOPY      9 years ago    [place] COLONOSCOPY FLX DX W/COLLJ SPEC WHEN PFRMD N/A 6/25/2018    Procedure: COLONOSCOPY;  Surgeon: Austen Vera MD;  Location: [facility];   Service: Gastroenterology         Family History     Family History   Problem Relation Age of Onset    Diabetes Mother     Hypertension Mother     Heart disease Father         cardiac disorder    Diabetes Father     Hypertension Father     Prostate cancer Father     Diabetes Sister     Hypertension Sister     Multiple myeloma Brother          Social History     Social History       Radiology

## 2020-09-01 DIAGNOSIS — I10 ESSENTIAL HYPERTENSION: ICD-10-CM

## 2020-09-01 RX ORDER — AMLODIPINE BESYLATE 10 MG/1
TABLET ORAL
Qty: 90 TABLET | Refills: 1 | Status: SHIPPED | OUTPATIENT
Start: 2020-09-01 | End: 2021-02-25

## 2020-10-27 ENCOUNTER — TRANSCRIBE ORDERS (OUTPATIENT)
Dept: LAB | Facility: CLINIC | Age: 70
End: 2020-10-27

## 2020-10-27 ENCOUNTER — LAB (OUTPATIENT)
Dept: LAB | Facility: CLINIC | Age: 70
End: 2020-10-27
Payer: MEDICARE

## 2020-10-27 DIAGNOSIS — C61 PROSTATE CANCER (HCC): ICD-10-CM

## 2020-10-27 DIAGNOSIS — E11.9 TYPE 2 DIABETES MELLITUS WITHOUT COMPLICATION, WITHOUT LONG-TERM CURRENT USE OF INSULIN (HCC): ICD-10-CM

## 2020-10-27 LAB — PSA SERPL-MCNC: 0.7 NG/ML (ref 0–4)

## 2020-10-27 PROCEDURE — 84153 ASSAY OF PSA TOTAL: CPT

## 2020-10-30 ENCOUNTER — OFFICE VISIT (OUTPATIENT)
Dept: UROLOGY | Facility: CLINIC | Age: 70
End: 2020-10-30
Payer: MEDICARE

## 2020-10-30 VITALS
WEIGHT: 197 LBS | BODY MASS INDEX: 29.18 KG/M2 | DIASTOLIC BLOOD PRESSURE: 88 MMHG | TEMPERATURE: 97 F | HEART RATE: 67 BPM | SYSTOLIC BLOOD PRESSURE: 124 MMHG | HEIGHT: 69 IN

## 2020-10-30 DIAGNOSIS — C61 PROSTATE CANCER (HCC): Primary | ICD-10-CM

## 2020-10-30 PROCEDURE — 99213 OFFICE O/P EST LOW 20 MIN: CPT | Performed by: PHYSICIAN ASSISTANT

## 2020-11-17 ENCOUNTER — LAB (OUTPATIENT)
Dept: LAB | Facility: CLINIC | Age: 70
End: 2020-11-17
Payer: MEDICARE

## 2020-11-17 LAB
ALBUMIN SERPL BCP-MCNC: 4.1 G/DL (ref 3.5–5)
ALP SERPL-CCNC: 126 U/L (ref 46–116)
ALT SERPL W P-5'-P-CCNC: 27 U/L (ref 12–78)
ANION GAP SERPL CALCULATED.3IONS-SCNC: 5 MMOL/L (ref 4–13)
AST SERPL W P-5'-P-CCNC: 24 U/L (ref 5–45)
BASOPHILS # BLD AUTO: 0.05 THOUSANDS/ΜL (ref 0–0.1)
BASOPHILS NFR BLD AUTO: 1 % (ref 0–1)
BILIRUB SERPL-MCNC: 0.51 MG/DL (ref 0.2–1)
BUN SERPL-MCNC: 14 MG/DL (ref 5–25)
CALCIUM SERPL-MCNC: 9.6 MG/DL (ref 8.3–10.1)
CHLORIDE SERPL-SCNC: 103 MMOL/L (ref 100–108)
CHOLEST SERPL-MCNC: 232 MG/DL (ref 50–200)
CO2 SERPL-SCNC: 29 MMOL/L (ref 21–32)
CREAT SERPL-MCNC: 0.84 MG/DL (ref 0.6–1.3)
EOSINOPHIL # BLD AUTO: 0.04 THOUSAND/ΜL (ref 0–0.61)
EOSINOPHIL NFR BLD AUTO: 1 % (ref 0–6)
ERYTHROCYTE [DISTWIDTH] IN BLOOD BY AUTOMATED COUNT: 15.5 % (ref 11.6–15.1)
GFR SERPL CREATININE-BSD FRML MDRD: 103 ML/MIN/1.73SQ M
GLUCOSE P FAST SERPL-MCNC: 97 MG/DL (ref 65–99)
HCT VFR BLD AUTO: 43.2 % (ref 36.5–49.3)
HDLC SERPL-MCNC: 111 MG/DL
HGB BLD-MCNC: 13.2 G/DL (ref 12–17)
IMM GRANULOCYTES # BLD AUTO: 0.01 THOUSAND/UL (ref 0–0.2)
IMM GRANULOCYTES NFR BLD AUTO: 0 % (ref 0–2)
LDLC SERPL CALC-MCNC: 110 MG/DL (ref 0–100)
LYMPHOCYTES # BLD AUTO: 1.52 THOUSANDS/ΜL (ref 0.6–4.47)
LYMPHOCYTES NFR BLD AUTO: 28 % (ref 14–44)
MCH RBC QN AUTO: 24.7 PG (ref 26.8–34.3)
MCHC RBC AUTO-ENTMCNC: 30.6 G/DL (ref 31.4–37.4)
MCV RBC AUTO: 81 FL (ref 82–98)
MONOCYTES # BLD AUTO: 0.6 THOUSAND/ΜL (ref 0.17–1.22)
MONOCYTES NFR BLD AUTO: 11 % (ref 4–12)
NEUTROPHILS # BLD AUTO: 3.2 THOUSANDS/ΜL (ref 1.85–7.62)
NEUTS SEG NFR BLD AUTO: 59 % (ref 43–75)
NONHDLC SERPL-MCNC: 121 MG/DL
NRBC BLD AUTO-RTO: 0 /100 WBCS
PLATELET # BLD AUTO: 190 THOUSANDS/UL (ref 149–390)
PMV BLD AUTO: 12.5 FL (ref 8.9–12.7)
POTASSIUM SERPL-SCNC: 4.7 MMOL/L (ref 3.5–5.3)
PROT SERPL-MCNC: 7.5 G/DL (ref 6.4–8.2)
RBC # BLD AUTO: 5.34 MILLION/UL (ref 3.88–5.62)
SODIUM SERPL-SCNC: 137 MMOL/L (ref 136–145)
TRIGL SERPL-MCNC: 54 MG/DL
WBC # BLD AUTO: 5.42 THOUSAND/UL (ref 4.31–10.16)

## 2020-11-17 PROCEDURE — 80061 LIPID PANEL: CPT

## 2020-11-17 PROCEDURE — 83036 HEMOGLOBIN GLYCOSYLATED A1C: CPT

## 2020-11-17 PROCEDURE — 85025 COMPLETE CBC W/AUTO DIFF WBC: CPT

## 2020-11-17 PROCEDURE — 36415 COLL VENOUS BLD VENIPUNCTURE: CPT

## 2020-11-17 PROCEDURE — 80053 COMPREHEN METABOLIC PANEL: CPT

## 2020-11-18 LAB
EST. AVERAGE GLUCOSE BLD GHB EST-MCNC: 128 MG/DL
HBA1C MFR BLD: 6.1 %

## 2020-12-07 ENCOUNTER — OFFICE VISIT (OUTPATIENT)
Dept: FAMILY MEDICINE CLINIC | Facility: CLINIC | Age: 70
End: 2020-12-07
Payer: MEDICARE

## 2020-12-07 VITALS
WEIGHT: 203 LBS | SYSTOLIC BLOOD PRESSURE: 120 MMHG | HEIGHT: 69 IN | BODY MASS INDEX: 30.07 KG/M2 | OXYGEN SATURATION: 98 % | HEART RATE: 66 BPM | DIASTOLIC BLOOD PRESSURE: 70 MMHG | RESPIRATION RATE: 16 BRPM

## 2020-12-07 DIAGNOSIS — E11.9 TYPE 2 DIABETES MELLITUS WITHOUT COMPLICATION, WITHOUT LONG-TERM CURRENT USE OF INSULIN (HCC): ICD-10-CM

## 2020-12-07 DIAGNOSIS — I10 BENIGN ESSENTIAL HYPERTENSION: ICD-10-CM

## 2020-12-07 DIAGNOSIS — Z00.00 MEDICARE ANNUAL WELLNESS VISIT, SUBSEQUENT: Primary | ICD-10-CM

## 2020-12-07 PROCEDURE — 99214 OFFICE O/P EST MOD 30 MIN: CPT | Performed by: FAMILY MEDICINE

## 2020-12-07 PROCEDURE — G0438 PPPS, INITIAL VISIT: HCPCS | Performed by: FAMILY MEDICINE

## 2020-12-07 PROCEDURE — 1123F ACP DISCUSS/DSCN MKR DOCD: CPT | Performed by: FAMILY MEDICINE

## 2021-02-13 DIAGNOSIS — Z23 ENCOUNTER FOR IMMUNIZATION: ICD-10-CM

## 2021-02-25 DIAGNOSIS — I10 ESSENTIAL HYPERTENSION: ICD-10-CM

## 2021-02-25 RX ORDER — AMLODIPINE BESYLATE 10 MG/1
TABLET ORAL
Qty: 90 TABLET | Refills: 1 | Status: SHIPPED | OUTPATIENT
Start: 2021-02-25 | End: 2021-08-26

## 2021-03-17 ENCOUNTER — APPOINTMENT (OUTPATIENT)
Dept: LAB | Facility: CLINIC | Age: 71
End: 2021-03-17
Payer: MEDICARE

## 2021-03-17 ENCOUNTER — TRANSCRIBE ORDERS (OUTPATIENT)
Dept: LAB | Facility: CLINIC | Age: 71
End: 2021-03-17

## 2021-03-17 DIAGNOSIS — C61 PROSTATE CANCER (HCC): ICD-10-CM

## 2021-03-17 DIAGNOSIS — E11.9 TYPE 2 DIABETES MELLITUS WITHOUT COMPLICATION, WITH LONG-TERM CURRENT USE OF INSULIN (HCC): Primary | ICD-10-CM

## 2021-03-17 DIAGNOSIS — E11.9 TYPE 2 DIABETES MELLITUS WITHOUT COMPLICATION, WITHOUT LONG-TERM CURRENT USE OF INSULIN (HCC): ICD-10-CM

## 2021-03-17 DIAGNOSIS — Z79.4 TYPE 2 DIABETES MELLITUS WITHOUT COMPLICATION, WITH LONG-TERM CURRENT USE OF INSULIN (HCC): Primary | ICD-10-CM

## 2021-03-17 LAB — PSA SERPL-MCNC: 3.3 NG/ML (ref 0–4)

## 2021-03-17 PROCEDURE — 84153 ASSAY OF PSA TOTAL: CPT

## 2021-03-19 ENCOUNTER — OFFICE VISIT (OUTPATIENT)
Dept: UROLOGY | Facility: CLINIC | Age: 71
End: 2021-03-19
Payer: MEDICARE

## 2021-03-19 ENCOUNTER — IMMUNIZATIONS (OUTPATIENT)
Dept: FAMILY MEDICINE CLINIC | Facility: HOSPITAL | Age: 71
End: 2021-03-19

## 2021-03-19 VITALS
HEIGHT: 69 IN | SYSTOLIC BLOOD PRESSURE: 120 MMHG | DIASTOLIC BLOOD PRESSURE: 86 MMHG | HEART RATE: 91 BPM | WEIGHT: 202 LBS | BODY MASS INDEX: 29.92 KG/M2

## 2021-03-19 DIAGNOSIS — Z23 ENCOUNTER FOR IMMUNIZATION: Primary | ICD-10-CM

## 2021-03-19 DIAGNOSIS — C61 PROSTATE CANCER (HCC): Primary | ICD-10-CM

## 2021-03-19 PROCEDURE — 0001A SARS-COV-2 / COVID-19 MRNA VACCINE (PFIZER-BIONTECH) 30 MCG: CPT

## 2021-03-19 PROCEDURE — 91300 SARS-COV-2 / COVID-19 MRNA VACCINE (PFIZER-BIONTECH) 30 MCG: CPT

## 2021-03-19 PROCEDURE — 99213 OFFICE O/P EST LOW 20 MIN: CPT | Performed by: PHYSICIAN ASSISTANT

## 2021-03-19 NOTE — PROGRESS NOTES
Assessment/Plan:    Prostate cancer [facility])  - patient is a 73-year-old metastatic prostate cancer status post radiation recurrence managed by Dr Ceferino Rios  Patient was on Lupron and his last Lupron shot was on 05/01/2020  Patient was electing to intermittent ADT due to sexual function concerns  -  Patient has been followed up closely with PSA  Was 0 7  Most recent was 2 3  Given interval change would recommend starting an additional dose of Lupron  -  Patient would like to postpone on from until after he has completed the COVID and 19 vaccination  This was discussed with Dr Ceferino Rios and he is okay with patient completing  COVID-19  Vaccinations  -  Patient will return after completion of vaccination, for  Lupron and PSA prior to visit  -  ZOILA performed today showed approximately 35 g prostate smooth with a palpable nodule on the left  Good rectal sphincter tone, no bogginess, firm  Problem List Items Addressed This Visit        Genitourinary    Prostate cancer ([facility] [address] ) - Primary     - patient is a 73-year-old metastatic prostate cancer status post radiation recurrence managed by Dr Ceferino Rios  Patient was on Lupron and his last Lupron shot was on 05/01/2020  Patient was electing to intermittent ADT due to sexual function concerns  -  Patient has been followed up closely with PSA  Was 0 7  Most recent was 2 3  Given interval change would recommend starting an additional dose of Lupron  -  Patient would like to postpone on from until after he has completed the COVID and 19 vaccination  This was discussed with Dr Ceferino Rios and he is okay with patient completing  COVID-19  Vaccinations  -  Patient will return after completion of vaccination, for  Lupron and PSA prior to visit  -  ZOILA performed today showed approximately 35 g prostate smooth with a palpable nodule on the left  Good rectal sphincter tone, no bogginess, firm            Relevant Orders    PSA Total, Diagnostic Subjective:      Patient ID: Latrice Green is a 80 y o  male  HPI   Patient is a 62-year-old male with metastatic prostate cancer  Status post radiation with recurrence managed by Dr Maria Teresa Harp  is  Last Lupron shot on 05/01/2020, patient was electing for intermittent ADT due to sexual function concerns  At last visit patient's PSA was 0 7 prior to his most recent PSA is 3 3  Patient reports that he is currently feeling well denying any current symptoms no abdominal pain, nausea, vomiting, bone pain, difficulty urinating or  changes with urination  Patient is currently scheduled to get the Glenn Reinoso COVID-19 vaccine today  Patient would like to wait until after he has completed COVID-19   Vaccination  The following portions of the patient's history were reviewed and updated as appropriate:   He  has a past medical history of Diabetes mellitus ([facility] [address] ), radiation therapy, Hypertension, [facility] ), and Shingles (02/22/2018)  He   Patient Active Problem List    Diagnosis Date Noted    Medicare annual wellness visit, subsequent 11/12/2018    Type 2 diabetes mellitus without complication ([facility] [address] ) 70/22/4923    Colon cancer screening 03/13/2018    Neoplasm of prostate, 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 ([facility] [address] ) 11/07/2017    Rising PSA level 10/02/2017    Prostate cancer ([facility] [address] ) 02/21/2017    Glaucoma 02/21/2017    Benign essential hypertension 02/21/2017     He  has a past surgical history that includes Colonoscopy and pr colonoscopy flx dx w/collj spec when pfrmd (N/A, 6/25/2018)  His family history includes Diabetes in his father, mother, and sister; Heart disease in his father; Hypertension in his father, mother, and sister; Multiple myeloma in his brother; Prostate cancer in his father  He  reports that he quit smoking about 52 years ago  He has never used smokeless tobacco  He reports that he does not drink alcohol or use drugs    Current Outpatient Medications   Medication Sig Dispense Refill    amLODIPine (NORVASC) 10 mg tablet TAKE 1 TABLET BY MOUTH EVERY DAY 90 tablet 1    calcium carbonate-vitamin D (OSCAL-D) 500 mg-200 units per tablet Take 1 tablet by mouth daily      Coenzyme Q10 (COQ10) 100 MG CAPS Take by mouth      Cyanocobalamin (B-12 PO) Take by mouth      latanoprost (XALATAN) 0 005 % ophthalmic solution INSTILL 1 DROP INTO BOTH EYES AT BEDTIME  FILL ON 01-09-18  0    MEGARED OMEGA-3 KRILL  MG CAPS Take by mouth      metFORMIN (GLUCOPHAGE) 850 mg tablet Take 1 tablet (850 mg total) by mouth daily with breakfast for 90 days, THEN 1 tablet (850 mg total) daily with breakfast  90 tablet 1    multivitamin (THERAGRAN) TABS Take 1 tablet by mouth daily      NADH POWD Take 250 mg by mouth      NON FORMULARY 2 (two) times a day CBD OIL      Saw Palmetto, Serenoa repens, 450 MG CAPS Take by mouth       No current facility-administered medications for this visit  He is allergic to lactate       Review of Systems   Constitutional: Negative  Negative for chills and fever  HENT: Negative  Respiratory: Negative  Cardiovascular: Negative  Gastrointestinal: Negative  Negative for abdominal pain, diarrhea, nausea and vomiting  Genitourinary: Negative for difficulty urinating, dysuria, flank pain, frequency, hematuria and urgency  Objective:    /86   Pulse 91   Ht 5' 9" (1 753 m)   Wt 91 6 kg (202 lb)   BMI 29 83 kg/m²        Physical Exam  Constitutional:       General: He is not in acute distress  Appearance: Normal appearance  He is normal weight  He is not ill-appearing or toxic-appearing  HENT:      Head: Normocephalic and atraumatic  Right Ear: External ear normal       Left Ear: External ear normal       Nose: Nose normal    Eyes:      General: No scleral icterus  Conjunctiva/sclera: Conjunctivae normal    Neck:      Musculoskeletal: Normal range of motion     Cardiovascular:      Pulses: Normal pulses  Pulmonary:      Effort: Pulmonary effort is normal  No respiratory distress  Abdominal:      General: Bowel sounds are normal  There is no distension  Genitourinary:     Comments: Prostate exam was performed which showed approximately 35g prostate smooth  With a palpable nodule on the left  No bogginess, no tenderness, good sphincter rectal tone  Musculoskeletal: Normal range of motion  Skin:     General: Skin is warm and dry  Neurological:      General: No focal deficit present  Mental Status: He is alert and oriented to person, place, and time  Psychiatric:         Mood and Affect: Mood normal          Behavior: Behavior normal          Thought Content:  Thought content normal          Judgment: Judgment normal          Del Norman, POLLO

## 2021-04-09 ENCOUNTER — APPOINTMENT (OUTPATIENT)
Dept: LAB | Facility: CLINIC | Age: 71
End: 2021-04-09
Payer: MEDICARE

## 2021-04-09 LAB
CHOLEST SERPL-MCNC: 205 MG/DL (ref 50–200)
EST. AVERAGE GLUCOSE BLD GHB EST-MCNC: 131 MG/DL
HBA1C MFR BLD: 6.2 %
HDLC SERPL-MCNC: 99 MG/DL
LDLC SERPL CALC-MCNC: 95 MG/DL (ref 0–100)
NONHDLC SERPL-MCNC: 106 MG/DL
TRIGL SERPL-MCNC: 57 MG/DL

## 2021-04-09 PROCEDURE — 36415 COLL VENOUS BLD VENIPUNCTURE: CPT

## 2021-04-09 PROCEDURE — 80061 LIPID PANEL: CPT

## 2021-04-09 PROCEDURE — 83036 HEMOGLOBIN GLYCOSYLATED A1C: CPT

## 2021-04-12 ENCOUNTER — IMMUNIZATIONS (OUTPATIENT)
Dept: FAMILY MEDICINE CLINIC | Facility: HOSPITAL | Age: 71
End: 2021-04-12

## 2021-04-12 ENCOUNTER — APPOINTMENT (OUTPATIENT)
Dept: LAB | Facility: CLINIC | Age: 71
End: 2021-04-12
Payer: MEDICARE

## 2021-04-12 DIAGNOSIS — E11.9 TYPE 2 DIABETES MELLITUS WITHOUT COMPLICATION, WITH LONG-TERM CURRENT USE OF INSULIN (HCC): ICD-10-CM

## 2021-04-12 DIAGNOSIS — Z79.4 TYPE 2 DIABETES MELLITUS WITHOUT COMPLICATION, WITH LONG-TERM CURRENT USE OF INSULIN (HCC): ICD-10-CM

## 2021-04-12 DIAGNOSIS — Z23 ENCOUNTER FOR IMMUNIZATION: Primary | ICD-10-CM

## 2021-04-12 LAB
BASOPHILS # BLD AUTO: 0.05 THOUSANDS/ΜL (ref 0–0.1)
BASOPHILS NFR BLD AUTO: 1 % (ref 0–1)
EOSINOPHIL # BLD AUTO: 0.09 THOUSAND/ΜL (ref 0–0.61)
EOSINOPHIL NFR BLD AUTO: 2 % (ref 0–6)
ERYTHROCYTE [DISTWIDTH] IN BLOOD BY AUTOMATED COUNT: 17.6 % (ref 11.6–15.1)
HCT VFR BLD AUTO: 46.7 % (ref 36.5–49.3)
HGB BLD-MCNC: 14.2 G/DL (ref 12–17)
IMM GRANULOCYTES # BLD AUTO: 0.01 THOUSAND/UL (ref 0–0.2)
IMM GRANULOCYTES NFR BLD AUTO: 0 % (ref 0–2)
LYMPHOCYTES # BLD AUTO: 1.76 THOUSANDS/ΜL (ref 0.6–4.47)
LYMPHOCYTES NFR BLD AUTO: 39 % (ref 14–44)
MCH RBC QN AUTO: 24.3 PG (ref 26.8–34.3)
MCHC RBC AUTO-ENTMCNC: 30.4 G/DL (ref 31.4–37.4)
MCV RBC AUTO: 80 FL (ref 82–98)
MONOCYTES # BLD AUTO: 0.42 THOUSAND/ΜL (ref 0.17–1.22)
MONOCYTES NFR BLD AUTO: 9 % (ref 4–12)
NEUTROPHILS # BLD AUTO: 2.17 THOUSANDS/ΜL (ref 1.85–7.62)
NEUTS SEG NFR BLD AUTO: 49 % (ref 43–75)
NRBC BLD AUTO-RTO: 0 /100 WBCS
PLATELET # BLD AUTO: 199 THOUSANDS/UL (ref 149–390)
PMV BLD AUTO: 10.9 FL (ref 8.9–12.7)
RBC # BLD AUTO: 5.85 MILLION/UL (ref 3.88–5.62)
WBC # BLD AUTO: 4.5 THOUSAND/UL (ref 4.31–10.16)

## 2021-04-12 PROCEDURE — 0002A SARS-COV-2 / COVID-19 MRNA VACCINE (PFIZER-BIONTECH) 30 MCG: CPT

## 2021-04-12 PROCEDURE — 36415 COLL VENOUS BLD VENIPUNCTURE: CPT

## 2021-04-12 PROCEDURE — 91300 SARS-COV-2 / COVID-19 MRNA VACCINE (PFIZER-BIONTECH) 30 MCG: CPT

## 2021-04-12 PROCEDURE — 85025 COMPLETE CBC W/AUTO DIFF WBC: CPT

## 2021-04-13 ENCOUNTER — OFFICE VISIT (OUTPATIENT)
Dept: FAMILY MEDICINE CLINIC | Facility: CLINIC | Age: 71
End: 2021-04-13
Payer: MEDICARE

## 2021-04-13 VITALS
HEART RATE: 88 BPM | HEIGHT: 69 IN | OXYGEN SATURATION: 97 % | BODY MASS INDEX: 30.07 KG/M2 | SYSTOLIC BLOOD PRESSURE: 130 MMHG | WEIGHT: 203 LBS | RESPIRATION RATE: 16 BRPM | DIASTOLIC BLOOD PRESSURE: 70 MMHG

## 2021-04-13 DIAGNOSIS — I10 BENIGN ESSENTIAL HYPERTENSION: ICD-10-CM

## 2021-04-13 DIAGNOSIS — E78.2 MIXED HYPERLIPIDEMIA: ICD-10-CM

## 2021-04-13 DIAGNOSIS — C79.51 NEOPLASM OF PROSTATE, DISTANT METASTASIS STAGING CATEGORY M1B: METASTASIS TO BONE (HCC): ICD-10-CM

## 2021-04-13 DIAGNOSIS — E11.9 TYPE 2 DIABETES MELLITUS WITHOUT COMPLICATION, WITHOUT LONG-TERM CURRENT USE OF INSULIN (HCC): Primary | ICD-10-CM

## 2021-04-13 DIAGNOSIS — C61 NEOPLASM OF PROSTATE, DISTANT METASTASIS STAGING CATEGORY M1B: METASTASIS TO BONE (HCC): ICD-10-CM

## 2021-04-13 PROCEDURE — 99214 OFFICE O/P EST MOD 30 MIN: CPT | Performed by: FAMILY MEDICINE

## 2021-04-13 NOTE — PROGRESS NOTES
Assessment/Plan:    Problem List Items Addressed This Visit        Endocrine    Type 2 diabetes mellitus without complication ([facility] [address] ) - Primary       Lab Results   Component Value Date    HGBA1C 6 2 (H) 04/09/2021   Will continue metformin 850 mg once a day         Relevant Orders    CBC and differential    Comprehensive metabolic panel    Hemoglobin A1C    Lipid panel       Cardiovascular and Mediastinum    Benign essential hypertension     His blood pressure is stable and he has been on amlodipine for long time, he will continue same            Genitourinary    Neoplasm of prostate, 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 [facility])  He has been falling urologist and uses been started on Lupron therapy       Other    Mixed hyperlipidemia     Lab Results   Component Value Date    [address] 04/09/2021   He has significant improvement in his cholesterol, and he wants to improve further by doing more exercise and he do intermittent fasting and low-calorie low carb diet, discussed with him about statins and he would like to continue with diet control             Relevant Orders    Lipid panel          Chief Complaint   Patient presents with    Follow-up     BMI Counseling: Body mass index is 29 98 kg/m²  The BMI is above normal  Nutrition recommendations include decreasing portion sizes, encouraging healthy choices of fruits and vegetables, decreasing fast food intake, limiting drinks that contain sugar and reducing intake of cholesterol  Exercise recommendations include exercising 3-5 times per week  Subjective:   Patient ID: Fernandez Hernandez is a 80 y o  male  Follow-up on his labs, his diabetic he says he eats minimum carbs in his diet and he takes 1 metformin with his dinner, his active, he has history of prostate cancer and recent elevation of PSA and urologist advised to start Lupron injection therapy and he plans to do, he got  covid  Vaccine    He denies any headache chest pain shortness of breath no bone pain,  For hypertension is on amlodipine, his blood pressure has been stable       Review of Systems   Constitutional: Negative for activity change, appetite change, chills, fatigue, fever and unexpected weight change  HENT: Negative for congestion, ear discharge, ear pain, nosebleeds, postnasal drip, rhinorrhea, sinus pressure, sneezing, sore throat, trouble swallowing and voice change  Eyes: Negative for photophobia, pain, discharge, redness and itching  Respiratory: Negative for cough, chest tightness, shortness of breath and wheezing  Cardiovascular: Negative for chest pain, palpitations and leg swelling  Gastrointestinal: Negative for abdominal pain, constipation, diarrhea, nausea and vomiting  Endocrine: Negative for polyuria  Genitourinary: Negative for dysuria, frequency and urgency  Musculoskeletal: Negative for arthralgias, back pain, myalgias and neck pain  Skin: Negative for color change, pallor and rash  Allergic/Immunologic: Negative for environmental allergies and food allergies  Neurological: Negative for dizziness, weakness, light-headedness and headaches  Hematological: Negative for adenopathy  Does not bruise/bleed easily  Psychiatric/Behavioral: Negative for behavioral problems  The patient is not nervous/anxious  Objective:  Physical Exam  Vitals signs and nursing note reviewed  Constitutional:       Appearance: He is well-developed  HENT:      Head: Normocephalic and atraumatic  Right Ear: Ear canal and external ear normal       Left Ear: Ear canal and external ear normal    Eyes:      General: No scleral icterus  Extraocular Movements: Extraocular movements intact  Conjunctiva/sclera: Conjunctivae normal    Neck:      Musculoskeletal: Normal range of motion and neck supple  Thyroid: No thyromegaly  Cardiovascular:      Rate and Rhythm: Normal rate and regular rhythm  Heart sounds: Normal heart sounds   No murmur  Pulmonary:      Effort: Pulmonary effort is normal       Breath sounds: Normal breath sounds  No wheezing or rales  Abdominal:      Palpations: There is no mass  Tenderness: There is no abdominal tenderness  Musculoskeletal: Normal range of motion  General: No swelling  Right lower leg: No edema  Left lower leg: No edema  Lymphadenopathy:      Cervical: No cervical adenopathy  Skin:     Coloration: Skin is not jaundiced  Findings: No erythema or rash  Neurological:      General: No focal deficit present  Mental Status: He is alert and oriented to person, place, and time  Psychiatric:         Mood and Affect: Mood normal             Past Surgical History:   Procedure Laterality Date    COLONOSCOPY      9 years ago    [place] COLONOSCOPY FLX DX W/COLLJ SPEC WHEN PFRMD N/A 6/25/2018    Procedure: COLONOSCOPY;  Surgeon: Navid Tripathi MD;  Location: [facility]; Service: Gastroenterology       Family History   Problem Relation Age of Onset    Diabetes Mother     Hypertension Mother     Heart disease Father         cardiac disorder    Diabetes Father     Hypertension Father     Prostate cancer Father     Diabetes Sister     Hypertension Sister     Multiple myeloma Brother          Current Outpatient Medications:     amLODIPine (NORVASC) 10 mg tablet, TAKE 1 TABLET BY MOUTH EVERY DAY, Disp: 90 tablet, Rfl: 1    Barberry-Oreg Grape-Goldenseal (BERBERINE COMPLEX PO), Take by mouth, Disp: , Rfl:     calcium carbonate-vitamin D (OSCAL-D) 500 mg-200 units per tablet, Take 1 tablet by mouth daily, Disp: , Rfl:     Coenzyme Q10 (COQ10) 100 MG CAPS, Take by mouth, Disp: , Rfl:     Cyanocobalamin (B-12 PO), Take by mouth, Disp: , Rfl:     latanoprost (XALATAN) 0 005 % ophthalmic solution, INSTILL 1 DROP INTO BOTH EYES AT BEDTIME   FILL ON 01-09-18, Disp: , Rfl: 0    MEGARED OMEGA-3 KRILL  MG CAPS, Take by mouth, Disp: , Rfl:     metFORMIN (GLUCOPHAGE) 850 mg tablet, Take 1 tablet (850 mg total) by mouth daily with breakfast for 90 days, THEN 1 tablet (850 mg total) daily with breakfast , Disp: 90 tablet, Rfl: 1    multivitamin (THERAGRAN) TABS, Take 1 tablet by mouth daily, Disp: , Rfl:     NON FORMULARY, 2 (two) times a day CBD OIL, Disp: , Rfl:     Saw Palmetto, Serenoa repens, 450 MG CAPS, Take by mouth, Disp: , Rfl:     NADH POWD, Take 250 mg by mouth, Disp: , Rfl:     Allergies   Allergen Reactions    Lactate Diarrhea       Vitals:    04/13/21 0830   BP: 130/70   Pulse: 88   Resp: 16   SpO2: 97%   Weight: 92 1 kg (203 lb)   Height: 5' 9" (1 753 m)

## 2021-04-13 NOTE — ASSESSMENT & PLAN NOTE
Lab Results   Component Value Date    HGBA1C 6 2 (H) 04/09/2021   Will continue metformin 850 mg once a day

## 2021-04-13 NOTE — ASSESSMENT & PLAN NOTE
Lab Results   Component Value Date    [address] 04/09/2021   He has significant improvement in his cholesterol, and he wants to improve further by doing more exercise and he do intermittent fasting and low-calorie low carb diet, discussed with him about statins and he would like to continue with diet control

## 2021-05-17 ENCOUNTER — APPOINTMENT (OUTPATIENT)
Dept: LAB | Facility: CLINIC | Age: 71
End: 2021-05-17
Payer: MEDICARE

## 2021-05-17 DIAGNOSIS — C61 PROSTATE CANCER (HCC): ICD-10-CM

## 2021-05-17 LAB — PSA SERPL-MCNC: 5.8 NG/ML (ref 0–4)

## 2021-05-17 PROCEDURE — 84153 ASSAY OF PSA TOTAL: CPT

## 2021-05-19 ENCOUNTER — TELEPHONE (OUTPATIENT)
Dept: UROLOGY | Facility: CLINIC | Age: 71
End: 2021-05-19

## 2021-05-19 ENCOUNTER — PROCEDURE VISIT (OUTPATIENT)
Dept: UROLOGY | Facility: CLINIC | Age: 71
End: 2021-05-19
Payer: MEDICARE

## 2021-05-19 VITALS
HEART RATE: 66 BPM | HEIGHT: 69 IN | SYSTOLIC BLOOD PRESSURE: 126 MMHG | BODY MASS INDEX: 30.07 KG/M2 | DIASTOLIC BLOOD PRESSURE: 79 MMHG | WEIGHT: 203 LBS

## 2021-05-19 DIAGNOSIS — C61 PROSTATE CANCER (HCC): Primary | ICD-10-CM

## 2021-05-19 PROCEDURE — 96402 CHEMO HORMON ANTINEOPL SQ/IM: CPT

## 2021-05-19 NOTE — TELEPHONE ENCOUNTER
----- Message from Enedelia Mills sent at 5/18/2021  8:52 PM EDT -----  Regarding: Non-Urgent Medical Question  Contact: [phone]  Would like to Ese Dinero will I be scheduled for a bone scan

## 2021-05-19 NOTE — PROGRESS NOTES
5/19/2021  Zuhair Heath is a 80 y o  male  [id number]    Diagnosis:  Chief Complaint     Prostate Cancer          Patient presents for Lupron IM injection managed by Dr Pallavi Nascimento:  Patient to follow up in 3 months on 8/19/2021with bone scan and PSA ptv       Medication administration:    Lupron (6 month) IM injection administered without incident  Discussed side effects that may occur after administration of medication  Patient is familiar as he has had the injection before  Patient denies any questions or concerns after administration of medication  Patient requested repeat bone scan  Spoke with provider Lennox Arthur, PA-C in the office  OK for patient to have scan  Order placed  Patient states he will have this done prior to his follow up appointment       Administrations This Visit     leuprolide (LUPRON DEPOT 6 MONTH KIT) IM injection kit 45 mg     Admin Date  05/19/2021 Action  Given Dose  45 mg Route  Intramuscular Administered By  Gege Solano RN                Vitals:    05/19/21 1133   BP: 126/79   BP Location: Left arm   Patient Position: Sitting   Cuff Size: Standard   Pulse: 66   Weight: 92 1 kg (203 lb)   Height: 5' 9" (1 753 m)         Gege Solano RN

## 2021-05-30 DIAGNOSIS — E11.9 TYPE 2 DIABETES MELLITUS WITHOUT COMPLICATION, WITHOUT LONG-TERM CURRENT USE OF INSULIN (HCC): ICD-10-CM

## 2021-06-03 ENCOUNTER — HOSPITAL ENCOUNTER (OUTPATIENT)
Dept: NUCLEAR MEDICINE | Facility: HOSPITAL | Age: 71
Discharge: HOME | End: 2021-06-03
Payer: MEDICARE

## 2021-06-03 DIAGNOSIS — C61 PROSTATE CANCER (HCC): ICD-10-CM

## 2021-06-03 PROCEDURE — 78306 BONE IMAGING WHOLE BODY: CPT

## 2021-06-03 PROCEDURE — A9503 TC99M MEDRONATE: HCPCS

## 2021-06-03 PROCEDURE — G1004 CDSM NDSC: HCPCS

## 2021-08-06 ENCOUNTER — APPOINTMENT (OUTPATIENT)
Dept: LAB | Facility: CLINIC | Age: 71
End: 2021-08-06
Payer: MEDICARE

## 2021-08-06 DIAGNOSIS — C61 PROSTATE CANCER (HCC): ICD-10-CM

## 2021-08-06 DIAGNOSIS — E78.2 MIXED HYPERLIPIDEMIA: ICD-10-CM

## 2021-08-06 DIAGNOSIS — E11.9 TYPE 2 DIABETES MELLITUS WITHOUT COMPLICATION, WITHOUT LONG-TERM CURRENT USE OF INSULIN (HCC): ICD-10-CM

## 2021-08-06 LAB
ALBUMIN SERPL BCP-MCNC: 4.1 G/DL (ref 3.5–5)
ALP SERPL-CCNC: 120 U/L (ref 46–116)
ALT SERPL W P-5'-P-CCNC: 26 U/L (ref 12–78)
ANION GAP SERPL CALCULATED.3IONS-SCNC: 6 MMOL/L (ref 4–13)
AST SERPL W P-5'-P-CCNC: 26 U/L (ref 5–45)
BASOPHILS # BLD AUTO: 0.04 THOUSANDS/ΜL (ref 0–0.1)
BASOPHILS NFR BLD AUTO: 1 % (ref 0–1)
BILIRUB SERPL-MCNC: 0.68 MG/DL (ref 0.2–1)
BUN SERPL-MCNC: 17 MG/DL (ref 5–25)
CALCIUM SERPL-MCNC: 9.2 MG/DL (ref 8.3–10.1)
CHLORIDE SERPL-SCNC: 103 MMOL/L (ref 100–108)
CHOLEST SERPL-MCNC: 242 MG/DL (ref 50–200)
CO2 SERPL-SCNC: 28 MMOL/L (ref 21–32)
CREAT SERPL-MCNC: 0.73 MG/DL (ref 0.6–1.3)
EOSINOPHIL # BLD AUTO: 0.04 THOUSAND/ΜL (ref 0–0.61)
EOSINOPHIL NFR BLD AUTO: 1 % (ref 0–6)
ERYTHROCYTE [DISTWIDTH] IN BLOOD BY AUTOMATED COUNT: 17 % (ref 11.6–15.1)
EST. AVERAGE GLUCOSE BLD GHB EST-MCNC: 137 MG/DL
GFR SERPL CREATININE-BSD FRML MDRD: 109 ML/MIN/1.73SQ M
GLUCOSE P FAST SERPL-MCNC: 93 MG/DL (ref 65–99)
HBA1C MFR BLD: 6.4 %
HCT VFR BLD AUTO: 45.2 % (ref 36.5–49.3)
HDLC SERPL-MCNC: 106 MG/DL
HGB BLD-MCNC: 14.1 G/DL (ref 12–17)
IMM GRANULOCYTES # BLD AUTO: 0.02 THOUSAND/UL (ref 0–0.2)
IMM GRANULOCYTES NFR BLD AUTO: 0 % (ref 0–2)
LDLC SERPL CALC-MCNC: 123 MG/DL (ref 0–100)
LYMPHOCYTES # BLD AUTO: 1.63 THOUSANDS/ΜL (ref 0.6–4.47)
LYMPHOCYTES NFR BLD AUTO: 33 % (ref 14–44)
MCH RBC QN AUTO: 24.7 PG (ref 26.8–34.3)
MCHC RBC AUTO-ENTMCNC: 31.2 G/DL (ref 31.4–37.4)
MCV RBC AUTO: 79 FL (ref 82–98)
MONOCYTES # BLD AUTO: 0.62 THOUSAND/ΜL (ref 0.17–1.22)
MONOCYTES NFR BLD AUTO: 12 % (ref 4–12)
NEUTROPHILS # BLD AUTO: 2.64 THOUSANDS/ΜL (ref 1.85–7.62)
NEUTS SEG NFR BLD AUTO: 53 % (ref 43–75)
NONHDLC SERPL-MCNC: 136 MG/DL
NRBC BLD AUTO-RTO: 0 /100 WBCS
PLATELET # BLD AUTO: 231 THOUSANDS/UL (ref 149–390)
PMV BLD AUTO: 12.6 FL (ref 8.9–12.7)
POTASSIUM SERPL-SCNC: 3.8 MMOL/L (ref 3.5–5.3)
PROT SERPL-MCNC: 7.9 G/DL (ref 6.4–8.2)
PSA SERPL-MCNC: 1.5 NG/ML (ref 0–4)
RBC # BLD AUTO: 5.71 MILLION/UL (ref 3.88–5.62)
SODIUM SERPL-SCNC: 137 MMOL/L (ref 136–145)
TRIGL SERPL-MCNC: 67 MG/DL
WBC # BLD AUTO: 4.99 THOUSAND/UL (ref 4.31–10.16)

## 2021-08-06 PROCEDURE — 36415 COLL VENOUS BLD VENIPUNCTURE: CPT

## 2021-08-06 PROCEDURE — 84153 ASSAY OF PSA TOTAL: CPT

## 2021-08-06 PROCEDURE — 80061 LIPID PANEL: CPT

## 2021-08-06 PROCEDURE — 83036 HEMOGLOBIN GLYCOSYLATED A1C: CPT

## 2021-08-06 PROCEDURE — 80053 COMPREHEN METABOLIC PANEL: CPT

## 2021-08-06 PROCEDURE — 85025 COMPLETE CBC W/AUTO DIFF WBC: CPT

## 2021-08-10 ENCOUNTER — OFFICE VISIT (OUTPATIENT)
Dept: FAMILY MEDICINE CLINIC | Facility: CLINIC | Age: 71
End: 2021-08-10
Payer: MEDICARE

## 2021-08-10 VITALS
BODY MASS INDEX: 29.62 KG/M2 | WEIGHT: 200 LBS | HEART RATE: 94 BPM | HEIGHT: 69 IN | DIASTOLIC BLOOD PRESSURE: 70 MMHG | OXYGEN SATURATION: 97 % | RESPIRATION RATE: 16 BRPM | SYSTOLIC BLOOD PRESSURE: 142 MMHG

## 2021-08-10 DIAGNOSIS — E78.2 MIXED HYPERLIPIDEMIA: ICD-10-CM

## 2021-08-10 DIAGNOSIS — I10 BENIGN ESSENTIAL HYPERTENSION: ICD-10-CM

## 2021-08-10 DIAGNOSIS — E11.9 TYPE 2 DIABETES MELLITUS WITHOUT COMPLICATION, WITHOUT LONG-TERM CURRENT USE OF INSULIN (HCC): Primary | ICD-10-CM

## 2021-08-10 DIAGNOSIS — C61 PROSTATE CANCER (HCC): ICD-10-CM

## 2021-08-10 PROCEDURE — 99214 OFFICE O/P EST MOD 30 MIN: CPT | Performed by: FAMILY MEDICINE

## 2021-08-10 RX ORDER — SIMVASTATIN 10 MG
10 TABLET ORAL
Qty: 90 TABLET | Refills: 3 | Status: SHIPPED | OUTPATIENT
Start: 2021-08-10

## 2021-08-10 NOTE — PROGRESS NOTES
Assessment/Plan:    Problem List Items Addressed This Visit        Endocrine    Type 2 diabetes mellitus without complication ([facility] [address] ) - Primary       Lab Results   Component Value Date    HGBA1C 6 4 (H) 08/06/2021   take metformin daily   Low carb diet          Relevant Orders    Microalbumin / creatinine urine ratio    Ambulatory referral to Ophthalmology    CBC and differential    Comprehensive metabolic panel    Hemoglobin A1C       Cardiovascular and Mediastinum    Benign essential hypertension   hypertension is stable       Genitourinary    Prostate cancer [facility])   follows urologist and his bone scan shows no metastasis in the bones       Other    Mixed hyperlipidemia     His cholesterol is going up, he is diabetic, discussed with him on low-fat diet but will start him on low dose simvastatin and the side effects discussed with him         Relevant Medications    simvastatin (ZOCOR) 10 mg tablet  Lab Results   Component Value Date    LDLCALC 123 (H) 08/06/2021      will start him on simvastatin low dose will start on low-fat diet    Other Relevant Orders    Lipid panel          Chief Complaint   Patient presents with    Follow-up       Subjective:   Patient ID: Lupe Barnes is a 80 y o  male  Follow-up on hypertension, hyperlipidemia, diabetes, he only takes metformin, and doing well, he says he had been taking Lupron injections for prostate cancer and his bone scan shows resolving bone Mets      Review of Systems   Constitutional: Negative for activity change, appetite change, chills, fatigue, fever and unexpected weight change  HENT: Negative for congestion, ear discharge, ear pain, nosebleeds, postnasal drip, rhinorrhea, sinus pressure, sneezing, sore throat, trouble swallowing and voice change  Eyes: Negative for photophobia, pain, discharge, redness and itching  Respiratory: Negative for cough, chest tightness, shortness of breath and wheezing      Cardiovascular: Negative for chest pain, palpitations and leg swelling  Gastrointestinal: Negative for abdominal pain, constipation, diarrhea, nausea and vomiting  Endocrine: Negative for polyuria  Genitourinary: Negative for dysuria, frequency and urgency  Musculoskeletal: Negative for arthralgias, back pain, myalgias and neck pain  Skin: Negative for color change, pallor and rash  Allergic/Immunologic: Negative for environmental allergies and food allergies  Neurological: Negative for dizziness, weakness, light-headedness and headaches  Hematological: Negative for adenopathy  Does not bruise/bleed easily  Psychiatric/Behavioral: Negative for behavioral problems  The patient is not nervous/anxious  Procedures    Objective:  Physical Exam  Vitals and nursing note reviewed  Constitutional:       Appearance: He is well-developed  HENT:      Head: Normocephalic and atraumatic  Right Ear: External ear normal       Left Ear: External ear normal    Eyes:      General: No scleral icterus  Conjunctiva/sclera: Conjunctivae normal       Pupils: Pupils are equal, round, and reactive to light  Neck:      Thyroid: No thyromegaly  Cardiovascular:      Rate and Rhythm: Normal rate and regular rhythm  Heart sounds: Normal heart sounds  Pulmonary:      Effort: Pulmonary effort is normal       Breath sounds: Normal breath sounds  No wheezing or rales  Musculoskeletal:      Cervical back: Normal range of motion and neck supple  Right lower leg: No edema  Left lower leg: No edema  Lymphadenopathy:      Cervical: No cervical adenopathy  Skin:     Findings: No erythema or rash  Neurological:      Mental Status: He is alert  Past Surgical History:   Procedure Laterality Date    COLONOSCOPY      9 years ago    VT COLONOSCOPY FLX DX W/COLLJ SPEC WHEN PFRMD N/A 6/25/2018    Procedure: COLONOSCOPY;  Surgeon: Iván Encarnacion MD;  Location: [facility];   Service: Gastroenterology       Family History   Problem Relation Age of Onset    Diabetes Mother     Hypertension Mother     Heart disease Father         cardiac disorder    Diabetes Father     Hypertension Father     Prostate cancer Father     Diabetes Sister     Hypertension Sister     Multiple myeloma Brother          Current Outpatient Medications:     amLODIPine (NORVASC) 10 mg tablet, TAKE 1 TABLET BY MOUTH EVERY DAY, Disp: 90 tablet, Rfl: 1    Barberry-Oreg Grape-Goldenseal (BERBERINE COMPLEX PO), Take by mouth, Disp: , Rfl:     calcium carbonate-vitamin D (OSCAL-D) 500 mg-200 units per tablet, Take 1 tablet by mouth daily, Disp: , Rfl:     Coenzyme Q10 (COQ10) 100 MG CAPS, Take by mouth, Disp: , Rfl:     Cyanocobalamin (B-12 PO), Take by mouth, Disp: , Rfl:     latanoprost (XALATAN) 0 005 % ophthalmic solution, INSTILL 1 DROP INTO BOTH EYES AT BEDTIME   FILL ON 01-09-18, Disp: , Rfl: 0    MEGARED OMEGA-3 KRILL  MG CAPS, Take by mouth, Disp: , Rfl:     metFORMIN (GLUCOPHAGE) 850 mg tablet, TAKE 1 TABLET DAILY WITH BREAKFAST FOR 90 DAYS, Disp: 90 tablet, Rfl: 1    multivitamin (THERAGRAN) TABS, Take 1 tablet by mouth daily, Disp: , Rfl:     NADH POWD, Take 250 mg by mouth, Disp: , Rfl:     NON FORMULARY, 2 (two) times a day CBD OIL, Disp: , Rfl:     Saw Palmetto, Serenoa repens, 450 MG CAPS, Take by mouth, Disp: , Rfl:     simvastatin (ZOCOR) 10 mg tablet, Take 1 tablet (10 mg total) by mouth daily at bedtime, Disp: 90 tablet, Rfl: 3    Allergies   Allergen Reactions    Lactate Diarrhea       Vitals:    08/10/21 0835   BP: 142/70   Pulse: 94   Resp: 16   SpO2: 97%   Weight: 90 7 kg (200 lb)   Height: 5' 9" (1 753 m)

## 2021-08-10 NOTE — ASSESSMENT & PLAN NOTE
His cholesterol is going up, he is diabetic, discussed with him on low-fat diet but will start him on low dose simvastatin and the side effects discussed with him

## 2021-08-10 NOTE — ASSESSMENT & PLAN NOTE
Lab Results   Component Value Date    HGBA1C 6 4 (H) 08/06/2021   take metformin daily    Low carb diet

## 2021-08-19 ENCOUNTER — OFFICE VISIT (OUTPATIENT)
Dept: UROLOGY | Facility: CLINIC | Age: 71
End: 2021-08-19
Payer: MEDICARE

## 2021-08-19 VITALS
BODY MASS INDEX: 29.65 KG/M2 | WEIGHT: 200.2 LBS | SYSTOLIC BLOOD PRESSURE: 138 MMHG | HEIGHT: 69 IN | DIASTOLIC BLOOD PRESSURE: 82 MMHG

## 2021-08-19 DIAGNOSIS — C61 PROSTATE CANCER (HCC): Primary | ICD-10-CM

## 2021-08-19 PROCEDURE — 99213 OFFICE O/P EST LOW 20 MIN: CPT | Performed by: PHYSICIAN ASSISTANT

## 2021-08-19 NOTE — PROGRESS NOTES
UROLOGY PROGRESS NOTE   Patient Identifiers: Collette Kudo (MRN [id number])  Date of Service: 8/19/2021    Subjective:     29-year-old man with history recurrent and metastatic [place] 6 prostate cancer  He was originally treated in [facility] around 2004 with radiation therapy   And ADT for 6 months  He had a change in PSA from 1 8-2 8 and then 3 6 at which time he was seen by Virgilio Kirk for Urology and started onFirmagon  He has been on intermittent ADT for several years now  He was found to have metastatic disease to the spine  Follow-up bone scan prior to this visit showed decreased or resolved areas of uptake in the midthoracic spine and right hemipelvis  Current PSA is 1 5  He has no bone pain or weight loss  No voiding complaints  Reason for visit:  Metastatic prostate cancer follow-up     Objective:     VITALS:    Vitals:    08/19/21 0954   BP: 138/82           LABS:  Lab Results   Component Value Date    HGB 14 1 08/06/2021    HCT 45 2 08/06/2021    WBC 4 99 08/06/2021     08/06/2021   ]    Lab Results   Component Value Date    K 3 8 08/06/2021     08/06/2021    CO2 28 08/06/2021    BUN 17 08/06/2021    CREATININE 0 73 08/06/2021    CALCIUM 9 2 08/06/2021   ]        INPATIENT MEDS:    Current Outpatient Medications:     amLODIPine (NORVASC) 10 mg tablet, TAKE 1 TABLET BY MOUTH EVERY DAY, Disp: 90 tablet, Rfl: 1    Barberry-Oreg Grape-Goldenseal (BERBERINE COMPLEX PO), Take by mouth, Disp: , Rfl:     calcium carbonate-vitamin D (OSCAL-D) 500 mg-200 units per tablet, Take 1 tablet by mouth daily, Disp: , Rfl:     Coenzyme Q10 (COQ10) 100 MG CAPS, Take by mouth, Disp: , Rfl:     Cyanocobalamin (B-12 PO), Take by mouth, Disp: , Rfl:     latanoprost (XALATAN) 0 005 % ophthalmic solution, INSTILL 1 DROP INTO BOTH EYES AT BEDTIME   FILL ON 01-09-18, Disp: , Rfl: 0    MEGARED OMEGA-3 KRILL  MG CAPS, Take by mouth, Disp: , Rfl:     metFORMIN (GLUCOPHAGE) 850 mg tablet, TAKE 1 TABLET DAILY WITH BREAKFAST FOR 90 DAYS, Disp: 90 tablet, Rfl: 1    multivitamin (THERAGRAN) TABS, Take 1 tablet by mouth daily, Disp: , Rfl:     NADH POWD, Take 250 mg by mouth, Disp: , Rfl:     NON FORMULARY, 2 (two) times a day CBD OIL, Disp: , Rfl:     Saw Palmetto, Serenoa repens, 450 MG CAPS, Take by mouth, Disp: , Rfl:     simvastatin (ZOCOR) 10 mg tablet, Take 1 tablet (10 mg total) by mouth daily at bedtime, Disp: 90 tablet, Rfl: 3  No current facility-administered medications for this visit  Physical Exam:   /82 (BP Location: Left arm, Patient Position: Sitting, Cuff Size: Adult)   Ht 5' 9" (1 753 m)   Wt 90 8 kg (200 lb 3 2 oz)   BMI 29 56 kg/m²   GEN: no acute distress    RESP: breathing comfortably with no accessory muscle use    ABD: soft, non-tender, non-distended   INCISION:    EXT: no significant peripheral edema     RADIOLOGY:   BONE SCAN WHOLE BODY   IMPRESSION:     1  Decreased or resolved areas of radiotracer uptake at the mid thoracic spine and right hemipelvis  Findings suggest response to therapy  2   No new findings suspicious for osseous metastasis      Assessment:    1   Metastatic [place] 6 prostate cancer     Plan:   - follow-up in 3 months with PSA prior to visit to consider his next Lupron injection  -  -  -

## 2021-08-26 DIAGNOSIS — I10 ESSENTIAL HYPERTENSION: ICD-10-CM

## 2021-08-26 RX ORDER — AMLODIPINE BESYLATE 10 MG/1
TABLET ORAL
Qty: 90 TABLET | Refills: 1 | Status: SHIPPED | OUTPATIENT
Start: 2021-08-26 | End: 2022-02-16

## 2021-11-19 ENCOUNTER — APPOINTMENT (OUTPATIENT)
Dept: LAB | Facility: CLINIC | Age: 71
End: 2021-11-19
Payer: MEDICARE

## 2021-11-19 DIAGNOSIS — C61 PROSTATE CANCER (HCC): ICD-10-CM

## 2021-11-19 LAB — PSA SERPL-MCNC: 0.8 NG/ML (ref 0–4)

## 2021-11-19 PROCEDURE — 84153 ASSAY OF PSA TOTAL: CPT

## 2021-11-23 ENCOUNTER — OFFICE VISIT (OUTPATIENT)
Dept: UROLOGY | Facility: CLINIC | Age: 71
End: 2021-11-23
Payer: MEDICARE

## 2021-11-23 VITALS
SYSTOLIC BLOOD PRESSURE: 138 MMHG | BODY MASS INDEX: 29.62 KG/M2 | DIASTOLIC BLOOD PRESSURE: 88 MMHG | WEIGHT: 200 LBS | HEIGHT: 69 IN

## 2021-11-23 DIAGNOSIS — C61 PROSTATE CANCER (HCC): Primary | ICD-10-CM

## 2021-11-23 PROCEDURE — 99213 OFFICE O/P EST LOW 20 MIN: CPT | Performed by: PHYSICIAN ASSISTANT

## 2021-12-07 ENCOUNTER — APPOINTMENT (OUTPATIENT)
Dept: LAB | Facility: AMBULARY SURGERY CENTER | Age: 71
End: 2021-12-07
Payer: MEDICARE

## 2021-12-07 DIAGNOSIS — E78.2 MIXED HYPERLIPIDEMIA: ICD-10-CM

## 2021-12-07 DIAGNOSIS — E11.9 TYPE 2 DIABETES MELLITUS WITHOUT COMPLICATION, WITHOUT LONG-TERM CURRENT USE OF INSULIN (HCC): ICD-10-CM

## 2021-12-07 LAB
ALBUMIN SERPL BCP-MCNC: 3.7 G/DL (ref 3.5–5)
ALP SERPL-CCNC: 116 U/L (ref 46–116)
ALT SERPL W P-5'-P-CCNC: 22 U/L (ref 12–78)
ANION GAP SERPL CALCULATED.3IONS-SCNC: 5 MMOL/L (ref 4–13)
AST SERPL W P-5'-P-CCNC: 20 U/L (ref 5–45)
BASOPHILS # BLD AUTO: 0.07 THOUSANDS/ΜL (ref 0–0.1)
BASOPHILS NFR BLD AUTO: 1 % (ref 0–1)
BILIRUB SERPL-MCNC: 0.66 MG/DL (ref 0.2–1)
BUN SERPL-MCNC: 14 MG/DL (ref 5–25)
CALCIUM SERPL-MCNC: 9.3 MG/DL (ref 8.3–10.1)
CHLORIDE SERPL-SCNC: 103 MMOL/L (ref 100–108)
CHOLEST SERPL-MCNC: 197 MG/DL
CO2 SERPL-SCNC: 27 MMOL/L (ref 21–32)
CREAT SERPL-MCNC: 0.72 MG/DL (ref 0.6–1.3)
CREAT UR-MCNC: 151 MG/DL
EOSINOPHIL # BLD AUTO: 0.06 THOUSAND/ΜL (ref 0–0.61)
EOSINOPHIL NFR BLD AUTO: 1 % (ref 0–6)
ERYTHROCYTE [DISTWIDTH] IN BLOOD BY AUTOMATED COUNT: 15.9 % (ref 11.6–15.1)
EST. AVERAGE GLUCOSE BLD GHB EST-MCNC: 126 MG/DL
GFR SERPL CREATININE-BSD FRML MDRD: 109 ML/MIN/1.73SQ M
GLUCOSE P FAST SERPL-MCNC: 116 MG/DL (ref 65–99)
HBA1C MFR BLD: 6 %
HCT VFR BLD AUTO: 44.3 % (ref 36.5–49.3)
HDLC SERPL-MCNC: 99 MG/DL
HGB BLD-MCNC: 13.5 G/DL (ref 12–17)
IMM GRANULOCYTES # BLD AUTO: 0.01 THOUSAND/UL (ref 0–0.2)
IMM GRANULOCYTES NFR BLD AUTO: 0 % (ref 0–2)
LDLC SERPL CALC-MCNC: 85 MG/DL (ref 0–100)
LYMPHOCYTES # BLD AUTO: 1.87 THOUSANDS/ΜL (ref 0.6–4.47)
LYMPHOCYTES NFR BLD AUTO: 28 % (ref 14–44)
MCH RBC QN AUTO: 24.4 PG (ref 26.8–34.3)
MCHC RBC AUTO-ENTMCNC: 30.5 G/DL (ref 31.4–37.4)
MCV RBC AUTO: 80 FL (ref 82–98)
MICROALBUMIN UR-MCNC: 7.6 MG/L (ref 0–20)
MICROALBUMIN/CREAT 24H UR: 5 MG/G CREATININE (ref 0–30)
MONOCYTES # BLD AUTO: 0.76 THOUSAND/ΜL (ref 0.17–1.22)
MONOCYTES NFR BLD AUTO: 12 % (ref 4–12)
NEUTROPHILS # BLD AUTO: 3.82 THOUSANDS/ΜL (ref 1.85–7.62)
NEUTS SEG NFR BLD AUTO: 58 % (ref 43–75)
NONHDLC SERPL-MCNC: 98 MG/DL
NRBC BLD AUTO-RTO: 0 /100 WBCS
PLATELET # BLD AUTO: 196 THOUSANDS/UL (ref 149–390)
PMV BLD AUTO: 11.1 FL (ref 8.9–12.7)
POTASSIUM SERPL-SCNC: 4.1 MMOL/L (ref 3.5–5.3)
PROT SERPL-MCNC: 7.4 G/DL (ref 6.4–8.2)
RBC # BLD AUTO: 5.54 MILLION/UL (ref 3.88–5.62)
SODIUM SERPL-SCNC: 135 MMOL/L (ref 136–145)
TRIGL SERPL-MCNC: 63 MG/DL
WBC # BLD AUTO: 6.59 THOUSAND/UL (ref 4.31–10.16)

## 2021-12-07 PROCEDURE — 85025 COMPLETE CBC W/AUTO DIFF WBC: CPT

## 2021-12-07 PROCEDURE — 80061 LIPID PANEL: CPT

## 2021-12-07 PROCEDURE — 82043 UR ALBUMIN QUANTITATIVE: CPT | Performed by: FAMILY MEDICINE

## 2021-12-07 PROCEDURE — 36415 COLL VENOUS BLD VENIPUNCTURE: CPT

## 2021-12-07 PROCEDURE — 82570 ASSAY OF URINE CREATININE: CPT | Performed by: FAMILY MEDICINE

## 2021-12-07 PROCEDURE — 80053 COMPREHEN METABOLIC PANEL: CPT

## 2021-12-07 PROCEDURE — 83036 HEMOGLOBIN GLYCOSYLATED A1C: CPT

## 2021-12-09 ENCOUNTER — OFFICE VISIT (OUTPATIENT)
Dept: FAMILY MEDICINE CLINIC | Facility: CLINIC | Age: 71
End: 2021-12-09
Payer: MEDICARE

## 2021-12-09 VITALS
BODY MASS INDEX: 29.03 KG/M2 | WEIGHT: 196 LBS | HEIGHT: 69 IN | DIASTOLIC BLOOD PRESSURE: 70 MMHG | OXYGEN SATURATION: 97 % | SYSTOLIC BLOOD PRESSURE: 138 MMHG | RESPIRATION RATE: 16 BRPM | HEART RATE: 80 BPM

## 2021-12-09 DIAGNOSIS — Z00.00 MEDICARE ANNUAL WELLNESS VISIT, SUBSEQUENT: Primary | ICD-10-CM

## 2021-12-09 DIAGNOSIS — E78.2 MIXED HYPERLIPIDEMIA: ICD-10-CM

## 2021-12-09 DIAGNOSIS — C61 PROSTATE CANCER (HCC): ICD-10-CM

## 2021-12-09 DIAGNOSIS — I10 BENIGN ESSENTIAL HYPERTENSION: ICD-10-CM

## 2021-12-09 DIAGNOSIS — E11.9 TYPE 2 DIABETES MELLITUS WITHOUT COMPLICATION, WITHOUT LONG-TERM CURRENT USE OF INSULIN (HCC): ICD-10-CM

## 2021-12-09 PROCEDURE — 99214 OFFICE O/P EST MOD 30 MIN: CPT | Performed by: FAMILY MEDICINE

## 2021-12-09 PROCEDURE — G0439 PPPS, SUBSEQ VISIT: HCPCS | Performed by: FAMILY MEDICINE

## 2022-02-14 DIAGNOSIS — E11.9 TYPE 2 DIABETES MELLITUS WITHOUT COMPLICATION, WITHOUT LONG-TERM CURRENT USE OF INSULIN (HCC): ICD-10-CM

## 2022-02-16 DIAGNOSIS — I10 ESSENTIAL HYPERTENSION: ICD-10-CM

## 2022-02-16 RX ORDER — AMLODIPINE BESYLATE 10 MG/1
TABLET ORAL
Qty: 90 TABLET | Refills: 1 | Status: SHIPPED | OUTPATIENT
Start: 2022-02-16 | End: 2022-03-09 | Stop reason: SDUPTHER

## 2022-03-09 DIAGNOSIS — I10 ESSENTIAL HYPERTENSION: ICD-10-CM

## 2022-03-09 RX ORDER — AMLODIPINE BESYLATE 10 MG/1
10 TABLET ORAL DAILY
Qty: 90 TABLET | Refills: 0 | Status: SHIPPED | OUTPATIENT
Start: 2022-03-09 | End: 2022-04-19 | Stop reason: SDUPTHER

## 2022-04-15 ENCOUNTER — APPOINTMENT (OUTPATIENT)
Dept: LAB | Facility: CLINIC | Age: 72
End: 2022-04-15
Payer: MEDICARE

## 2022-04-15 DIAGNOSIS — I10 BENIGN ESSENTIAL HYPERTENSION: ICD-10-CM

## 2022-04-15 DIAGNOSIS — E78.2 MIXED HYPERLIPIDEMIA: ICD-10-CM

## 2022-04-15 DIAGNOSIS — E11.9 TYPE 2 DIABETES MELLITUS WITHOUT COMPLICATION, WITHOUT LONG-TERM CURRENT USE OF INSULIN (HCC): ICD-10-CM

## 2022-04-15 LAB
ALBUMIN SERPL BCP-MCNC: 4.4 G/DL (ref 3.5–5)
ALP SERPL-CCNC: 135 U/L (ref 46–116)
ALT SERPL W P-5'-P-CCNC: 19 U/L (ref 12–78)
ANION GAP SERPL CALCULATED.3IONS-SCNC: 5 MMOL/L (ref 4–13)
AST SERPL W P-5'-P-CCNC: 20 U/L (ref 5–45)
BASOPHILS # BLD AUTO: 0.05 THOUSANDS/ΜL (ref 0–0.1)
BASOPHILS NFR BLD AUTO: 1 % (ref 0–1)
BILIRUB SERPL-MCNC: 0.58 MG/DL (ref 0.2–1)
BUN SERPL-MCNC: 11 MG/DL (ref 5–25)
CALCIUM SERPL-MCNC: 9.4 MG/DL (ref 8.3–10.1)
CHLORIDE SERPL-SCNC: 104 MMOL/L (ref 100–108)
CHOLEST SERPL-MCNC: 160 MG/DL
CO2 SERPL-SCNC: 30 MMOL/L (ref 21–32)
CREAT SERPL-MCNC: 0.83 MG/DL (ref 0.6–1.3)
EOSINOPHIL # BLD AUTO: 0.14 THOUSAND/ΜL (ref 0–0.61)
EOSINOPHIL NFR BLD AUTO: 3 % (ref 0–6)
ERYTHROCYTE [DISTWIDTH] IN BLOOD BY AUTOMATED COUNT: 16.7 % (ref 11.6–15.1)
EST. AVERAGE GLUCOSE BLD GHB EST-MCNC: 128 MG/DL
GFR SERPL CREATININE-BSD FRML MDRD: 88 ML/MIN/1.73SQ M
GLUCOSE P FAST SERPL-MCNC: 102 MG/DL (ref 65–99)
HBA1C MFR BLD: 6.1 %
HCT VFR BLD AUTO: 43.6 % (ref 36.5–49.3)
HDLC SERPL-MCNC: 92 MG/DL
HGB BLD-MCNC: 13.2 G/DL (ref 12–17)
IMM GRANULOCYTES # BLD AUTO: 0.01 THOUSAND/UL (ref 0–0.2)
IMM GRANULOCYTES NFR BLD AUTO: 0 % (ref 0–2)
LDLC SERPL CALC-MCNC: 58 MG/DL (ref 0–100)
LYMPHOCYTES # BLD AUTO: 1.7 THOUSANDS/ΜL (ref 0.6–4.47)
LYMPHOCYTES NFR BLD AUTO: 33 % (ref 14–44)
MCH RBC QN AUTO: 24.5 PG (ref 26.8–34.3)
MCHC RBC AUTO-ENTMCNC: 30.3 G/DL (ref 31.4–37.4)
MCV RBC AUTO: 81 FL (ref 82–98)
MONOCYTES # BLD AUTO: 0.51 THOUSAND/ΜL (ref 0.17–1.22)
MONOCYTES NFR BLD AUTO: 10 % (ref 4–12)
NEUTROPHILS # BLD AUTO: 2.72 THOUSANDS/ΜL (ref 1.85–7.62)
NEUTS SEG NFR BLD AUTO: 53 % (ref 43–75)
NONHDLC SERPL-MCNC: 68 MG/DL
NRBC BLD AUTO-RTO: 0 /100 WBCS
PLATELET # BLD AUTO: 233 THOUSANDS/UL (ref 149–390)
PMV BLD AUTO: 10.9 FL (ref 8.9–12.7)
POTASSIUM SERPL-SCNC: 4.2 MMOL/L (ref 3.5–5.3)
PROT SERPL-MCNC: 7.6 G/DL (ref 6.4–8.2)
RBC # BLD AUTO: 5.38 MILLION/UL (ref 3.88–5.62)
SODIUM SERPL-SCNC: 139 MMOL/L (ref 136–145)
TRIGL SERPL-MCNC: 52 MG/DL
TSH SERPL DL<=0.05 MIU/L-ACNC: 1.42 UIU/ML (ref 0.45–4.5)
WBC # BLD AUTO: 5.13 THOUSAND/UL (ref 4.31–10.16)

## 2022-04-15 PROCEDURE — 83036 HEMOGLOBIN GLYCOSYLATED A1C: CPT

## 2022-04-15 PROCEDURE — 36415 COLL VENOUS BLD VENIPUNCTURE: CPT

## 2022-04-15 PROCEDURE — 80061 LIPID PANEL: CPT

## 2022-04-15 PROCEDURE — 80053 COMPREHEN METABOLIC PANEL: CPT

## 2022-04-15 PROCEDURE — 84443 ASSAY THYROID STIM HORMONE: CPT

## 2022-04-15 PROCEDURE — 85025 COMPLETE CBC W/AUTO DIFF WBC: CPT

## 2022-04-19 ENCOUNTER — OFFICE VISIT (OUTPATIENT)
Dept: FAMILY MEDICINE CLINIC | Facility: CLINIC | Age: 72
End: 2022-04-19
Payer: MEDICARE

## 2022-04-19 VITALS
OXYGEN SATURATION: 100 % | SYSTOLIC BLOOD PRESSURE: 128 MMHG | HEART RATE: 78 BPM | WEIGHT: 196 LBS | HEIGHT: 69 IN | BODY MASS INDEX: 29.03 KG/M2 | DIASTOLIC BLOOD PRESSURE: 70 MMHG | RESPIRATION RATE: 16 BRPM

## 2022-04-19 DIAGNOSIS — E11.9 TYPE 2 DIABETES MELLITUS WITHOUT COMPLICATION, WITHOUT LONG-TERM CURRENT USE OF INSULIN (HCC): ICD-10-CM

## 2022-04-19 DIAGNOSIS — E78.2 MIXED HYPERLIPIDEMIA: ICD-10-CM

## 2022-04-19 DIAGNOSIS — C61 NEOPLASM OF PROSTATE, DISTANT METASTASIS STAGING CATEGORY M1B: METASTASIS TO BONE (HCC): ICD-10-CM

## 2022-04-19 DIAGNOSIS — C61 PROSTATE CANCER (HCC): ICD-10-CM

## 2022-04-19 DIAGNOSIS — C79.51 NEOPLASM OF PROSTATE, DISTANT METASTASIS STAGING CATEGORY M1B: METASTASIS TO BONE (HCC): ICD-10-CM

## 2022-04-19 DIAGNOSIS — I10 BENIGN ESSENTIAL HYPERTENSION: Primary | ICD-10-CM

## 2022-04-19 DIAGNOSIS — I10 ESSENTIAL HYPERTENSION: ICD-10-CM

## 2022-04-19 PROCEDURE — 99214 OFFICE O/P EST MOD 30 MIN: CPT | Performed by: FAMILY MEDICINE

## 2022-04-19 RX ORDER — AMLODIPINE BESYLATE 10 MG/1
10 TABLET ORAL DAILY
Qty: 90 TABLET | Refills: 0 | Status: SHIPPED | OUTPATIENT
Start: 2022-04-19

## 2022-04-19 NOTE — ASSESSMENT & PLAN NOTE
Lab Results   Component Value Date    HGBA1C 6 1 (H) 04/15/2022   He takes metformin 850 mg 1 in the morning daily

## 2022-04-19 NOTE — PROGRESS NOTES
Assessment/Plan:    Problem List Items Addressed This Visit        Endocrine    Type 2 diabetes mellitus without complication ([facility] )       Lab Results   Component Value Date    HGBA1C 6 1 (H) 04/15/2022   He takes metformin 850 mg 1 in the morning daily         Relevant Medications    metFORMIN (GLUCOPHAGE) 850 mg tablet    Other Relevant Orders    Ambulatory Referral to Ophthalmology    Diabetic foot exam    CBC and differential    Comprehensive metabolic panel    Hemoglobin A1C    Lipid panel       Cardiovascular and Mediastinum    Essential hypertension - Primary    Relevant Medications    amLODIPine (NORVASC) 10 mg tablet       Genitourinary    Prostate cancer ([facility] )    Neoplasm of prostate, 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 [facility])       Other    Mixed hyperlipidemia     Lab Results   Component Value Date    LDLCALC 58 04/15/2022   Continue statin           Relevant Orders    Lipid panel          Return in about 4 months (around 8/19/2022) for Recheck  Chief Complaint   Patient presents with    Follow-up     BMI Counseling: Body mass index is 28 94 kg/m²  The BMI is above normal  Nutrition recommendations include decreasing portion sizes, decreasing fast food intake, moderation in carbohydrate intake and reducing intake of cholesterol  Exercise recommendations include exercising 3-5 times per week and strength training exercises  Rationale for BMI follow-up plan is due to patient being overweight or obese  Depression Screening and Follow-up Plan: Patient was screened for depression during today's encounter  They screened negative with a PHQ-2 score of 0  Subjective:   Patient ID: Minnie García is a 71 y o  male  Diabetes  He presents for his follow-up diabetic visit  He has type 2 diabetes mellitus  His disease course has been stable   Pertinent negatives for hypoglycemia include no confusion, dizziness, headaches, nervousness/anxiousness, pallor, sleepiness, speech difficulty, sweats or tremors  Pertinent negatives for diabetes include no blurred vision, no chest pain, no fatigue, no foot ulcerations, no polydipsia, no polyphagia, no polyuria, no visual change, no weakness and no weight loss  There are no hypoglycemic complications  Symptoms are stable  There are no diabetic complications  Risk factors for coronary artery disease include diabetes mellitus, dyslipidemia, male sex and hypertension  He is compliant with treatment all of the time  He has not had a previous visit with a dietitian  He participates in exercise three times a week  An ACE inhibitor/angiotensin II receptor blocker is not being taken  He does not see a podiatrist Eye exam is not current  Hypertension  This is a chronic problem  The problem is unchanged  The problem is controlled  Pertinent negatives include no anxiety, blurred vision, chest pain, headaches, malaise/fatigue, neck pain, palpitations, peripheral edema, shortness of breath or sweats  There are no associated agents to hypertension  Risk factors for coronary artery disease include diabetes mellitus, dyslipidemia and male gender  Past treatments include calcium channel blockers  There is no history of angina  There is no history of chronic renal disease  hyperlipidemia on statins, he does intermittent fasting and does regular exercise, he follows urologist for prostate cancer and he gets once a year Lupron injections and his prostate cancer has been stable    Review of Systems   Constitutional: Negative for activity change, appetite change, chills, fatigue, fever, malaise/fatigue, unexpected weight change and weight loss  HENT: Negative for congestion, ear discharge, ear pain, nosebleeds, postnasal drip, rhinorrhea, sinus pressure, sneezing, sore throat, trouble swallowing and voice change  Eyes: Negative for blurred vision, photophobia, pain, discharge, redness and itching     Respiratory: Negative for cough, chest tightness, shortness of breath and wheezing  Cardiovascular: Negative for chest pain, palpitations and leg swelling  Gastrointestinal: Negative for abdominal pain, constipation, diarrhea, nausea and vomiting  Endocrine: Negative for polydipsia, polyphagia and polyuria  Genitourinary: Negative for dysuria, frequency and urgency  Musculoskeletal: Negative for arthralgias, back pain, myalgias and neck pain  Skin: Negative for color change, pallor and rash  Allergic/Immunologic: Negative for environmental allergies and food allergies  Neurological: Negative for dizziness, tremors, speech difficulty, weakness, light-headedness and headaches  Hematological: Negative for adenopathy  Does not bruise/bleed easily  Psychiatric/Behavioral: Negative for behavioral problems, confusion and sleep disturbance  The patient is not nervous/anxious  Objective:  Physical Exam  Vitals and nursing note reviewed  Constitutional:       Appearance: Normal appearance  He is well-developed  He is not ill-appearing  HENT:      Head: Normocephalic and atraumatic  Right Ear: External ear normal  There is no impacted cerumen  Left Ear: External ear normal  There is no impacted cerumen  Mouth/Throat:      Pharynx: No oropharyngeal exudate  Eyes:      General: No scleral icterus  Right eye: No discharge  Left eye: No discharge  Conjunctiva/sclera: Conjunctivae normal       Pupils: Pupils are equal, round, and reactive to light  Neck:      Thyroid: No thyromegaly  Trachea: No tracheal deviation  Cardiovascular:      Rate and Rhythm: Normal rate and regular rhythm  Pulses: no weak pulses          Dorsalis pedis pulses are 2+ on the right side and 2+ on the left side  Heart sounds: Normal heart sounds  No murmur heard  Pulmonary:      Effort: Pulmonary effort is normal  No respiratory distress  Breath sounds: Normal breath sounds  No wheezing or rales     Abdominal: General: Bowel sounds are normal  There is no distension  Palpations: Abdomen is soft  There is no mass  Tenderness: There is no abdominal tenderness  There is no rebound  Musculoskeletal:         General: Normal range of motion  Cervical back: Normal range of motion and neck supple  Right lower leg: No edema  Left lower leg: No edema  Feet:      Right foot:      Skin integrity: No ulcer, skin breakdown, erythema, warmth, callus or dry skin  Left foot:      Skin integrity: No ulcer, skin breakdown, erythema, warmth, callus or dry skin  Lymphadenopathy:      Cervical: No cervical adenopathy  Skin:     General: Skin is warm  Coloration: Skin is not jaundiced or pale  Findings: No erythema or rash  Neurological:      General: No focal deficit present  Mental Status: He is alert and oriented to person, place, and time  Cranial Nerves: No cranial nerve deficit  Motor: No weakness  Deep Tendon Reflexes: Reflexes are normal and symmetric  Psychiatric:         Behavior: Behavior normal          Thought Content: Thought content normal          Judgment: Judgment normal             Past Surgical History:   Procedure Laterality Date    COLONOSCOPY      9 years ago    ME COLONOSCOPY FLX DX W/COLLJ SPEC WHEN PFRMD N/A 6/25/2018    Procedure: COLONOSCOPY;  Surgeon: Eriberto Anthony MD;  Location: [facility];   Service: Gastroenterology       Family History   Problem Relation Age of Onset    Diabetes Mother     Hypertension Mother     Heart disease Father         cardiac disorder    Diabetes Father     Hypertension Father     Prostate cancer Father     Diabetes Sister     Hypertension Sister     Multiple myeloma Brother          Current Outpatient Medications:     amLODIPine (NORVASC) 10 mg tablet, Take 1 tablet (10 mg total) by mouth daily, Disp: 90 tablet, Rfl: 0    Barberry-Oreg Grape-Goldenseal (BERBERINE COMPLEX PO), Take by mouth, Disp: , Rfl:     calcium carbonate-vitamin D (OSCAL-D) 500 mg-200 units per tablet, Take 1 tablet by mouth daily, Disp: , Rfl:     Coenzyme Q10 (COQ10) 100 MG CAPS, Take by mouth, Disp: , Rfl:     Cyanocobalamin (B-12 PO), Take by mouth, Disp: , Rfl:     latanoprost (XALATAN) 0 005 % ophthalmic solution, INSTILL 1 DROP INTO BOTH EYES AT BEDTIME  FILL ON 01-09-18, Disp: , Rfl: 0    MEGARED OMEGA-3 KRILL  MG CAPS, Take by mouth, Disp: , Rfl:     metFORMIN (GLUCOPHAGE) 850 mg tablet, Take 1 tablet (850 mg total) by mouth daily with breakfast, Disp: 90 tablet, Rfl: 3    multivitamin (THERAGRAN) TABS, Take 1 tablet by mouth daily, Disp: , Rfl:     NADH POWD, Take 250 mg by mouth, Disp: , Rfl:     NON FORMULARY, 2 (two) times a day CBD OIL, Disp: , Rfl:     Saw Palmetto, Serenoa repens, 450 MG CAPS, Take by mouth, Disp: , Rfl:     simvastatin (ZOCOR) 10 mg tablet, Take 1 tablet (10 mg total) by mouth daily at bedtime, Disp: 90 tablet, Rfl: 3    Allergies   Allergen Reactions    Lactate Diarrhea       Vitals:    04/19/22 0813   BP: 128/70   Pulse: 78   Resp: 16   SpO2: 100%   Weight: 88 9 kg (196 lb)   Height: 5' 9" (1 753 m)   Patient's shoes and socks removed  Right Foot/Ankle   Right Foot Inspection  Skin Exam: skin normal  Skin not intact, no dry skin, no warmth, no callus, no erythema, no maceration, no abnormal color, no pre-ulcer, no ulcer and no callus  Toe Exam: ROM and strength within normal limits  Sensory   Vibration: intact  Proprioception: intact  Monofilament testing: intact    Vascular  Capillary refills: < 3 seconds  The right DP pulse is 2+  Left Foot/Ankle  Left Foot Inspection  Skin Exam: skin normal  Skin not intact, no dry skin, no warmth, no erythema, no maceration, normal color, no pre-ulcer, no ulcer and no callus  Toe Exam: ROM and strength within normal limits       Sensory   Vibration: intact  Proprioception: intact  Monofilament testing: intact    Vascular  Capillary refills: < 3 seconds  The left DP pulse is 2+       Assign Risk Category  No deformity present  No loss of protective sensation  No weak pulses  Risk: 0

## 2022-05-16 ENCOUNTER — APPOINTMENT (OUTPATIENT)
Dept: LAB | Facility: CLINIC | Age: 72
End: 2022-05-16
Payer: MEDICARE

## 2022-05-16 DIAGNOSIS — C61 PROSTATE CANCER (HCC): ICD-10-CM

## 2022-05-16 LAB — PSA SERPL-MCNC: 6 NG/ML (ref 0–4)

## 2022-05-16 PROCEDURE — 84153 ASSAY OF PSA TOTAL: CPT

## 2022-05-23 ENCOUNTER — OFFICE VISIT (OUTPATIENT)
Dept: UROLOGY | Facility: CLINIC | Age: 72
End: 2022-05-23
Payer: MEDICARE

## 2022-05-23 VITALS
HEART RATE: 82 BPM | OXYGEN SATURATION: 100 % | HEIGHT: 69 IN | SYSTOLIC BLOOD PRESSURE: 140 MMHG | WEIGHT: 191 LBS | BODY MASS INDEX: 28.29 KG/M2 | DIASTOLIC BLOOD PRESSURE: 82 MMHG

## 2022-05-23 DIAGNOSIS — C61 PROSTATE CANCER (HCC): Primary | ICD-10-CM

## 2022-05-23 PROCEDURE — 96402 CHEMO HORMON ANTINEOPL SQ/IM: CPT

## 2022-05-23 PROCEDURE — 99213 OFFICE O/P EST LOW 20 MIN: CPT | Performed by: PHYSICIAN ASSISTANT

## 2022-05-23 NOTE — PROGRESS NOTES
UROLOGY PROGRESS NOTE   Patient Identifiers: Niraj Sellers (MRN [id number])  Date of Service: 5/23/2022    Subjective:     78-year-old man history of metastatic [place] 6 prostate cancer  Regionally treated in [facility] around 2004 with radiation and 6 months of ADT  Last PSA was  0 5  Current PSA is 6  0  Denies any bone pain or weight loss  Denies any urinary symptoms  Reason for visit:  Metastatic prostate cancer follow-up      Objective:     VITALS:    There were no vitals filed for this visit  LABS:  Lab Results   Component Value Date    HGB 13 2 04/15/2022    HCT 43 6 04/15/2022    WBC 5 13 04/15/2022     04/15/2022   ]    Lab Results   Component Value Date    K 4 2 04/15/2022     04/15/2022    CO2 30 04/15/2022    BUN 11 04/15/2022    CREATININE 0 83 04/15/2022    CALCIUM 9 4 04/15/2022   ]        INPATIENT MEDS:    Current Outpatient Medications:     amLODIPine (NORVASC) 10 mg tablet, Take 1 tablet (10 mg total) by mouth daily, Disp: 90 tablet, Rfl: 0    Barberry-Oreg Grape-Goldenseal (BERBERINE COMPLEX PO), Take by mouth, Disp: , Rfl:     calcium carbonate-vitamin D (OSCAL-D) 500 mg-200 units per tablet, Take 1 tablet by mouth daily, Disp: , Rfl:     Coenzyme Q10 (COQ10) 100 MG CAPS, Take by mouth, Disp: , Rfl:     Cyanocobalamin (B-12 PO), Take by mouth, Disp: , Rfl:     latanoprost (XALATAN) 0 005 % ophthalmic solution, INSTILL 1 DROP INTO BOTH EYES AT BEDTIME   FILL ON 01-09-18, Disp: , Rfl: 0    MEGARED OMEGA-3 KRILL  MG CAPS, Take by mouth, Disp: , Rfl:     metFORMIN (GLUCOPHAGE) 850 mg tablet, Take 1 tablet (850 mg total) by mouth daily with breakfast, Disp: 90 tablet, Rfl: 3    multivitamin (THERAGRAN) TABS, Take 1 tablet by mouth daily, Disp: , Rfl:     NADH POWD, Take 250 mg by mouth, Disp: , Rfl:     NON FORMULARY, 2 (two) times a day CBD OIL, Disp: , Rfl:     Saw Palmetto, Serenoa repens, 450 MG CAPS, Take by mouth, Disp: , Rfl:     simvastatin (ZOCOR) 10 mg tablet, Take 1 tablet (10 mg total) by mouth daily at bedtime, Disp: 90 tablet, Rfl: 3      Physical Exam:   There were no vitals taken for this visit  GEN: no acute distress    RESP: breathing comfortably with no accessory muscle use    ABD: soft, non-tender, non-distended   INCISION:    EXT: no significant peripheral edema         RADIOLOGY:    none     Assessment:     1   Metastatic prostate cancer     Plan:   - Lupron 45 mg given today  - will check a PSA in 3 months and in 6 months prior to visit  -  -

## 2022-08-12 ENCOUNTER — APPOINTMENT (OUTPATIENT)
Dept: LAB | Facility: CLINIC | Age: 72
End: 2022-08-12
Payer: MEDICARE

## 2022-08-12 DIAGNOSIS — I10 ESSENTIAL HYPERTENSION: ICD-10-CM

## 2022-08-12 DIAGNOSIS — E11.9 TYPE 2 DIABETES MELLITUS WITHOUT COMPLICATION, WITHOUT LONG-TERM CURRENT USE OF INSULIN (HCC): ICD-10-CM

## 2022-08-12 DIAGNOSIS — E78.2 MIXED HYPERLIPIDEMIA: ICD-10-CM

## 2022-08-12 LAB
ALBUMIN SERPL BCP-MCNC: 3.7 G/DL (ref 3.5–5)
ALP SERPL-CCNC: 106 U/L (ref 46–116)
ALT SERPL W P-5'-P-CCNC: 26 U/L (ref 12–78)
ANION GAP SERPL CALCULATED.3IONS-SCNC: 2 MMOL/L (ref 4–13)
AST SERPL W P-5'-P-CCNC: 28 U/L (ref 5–45)
BASOPHILS # BLD AUTO: 0.05 THOUSANDS/ΜL (ref 0–0.1)
BASOPHILS NFR BLD AUTO: 1 % (ref 0–1)
BILIRUB SERPL-MCNC: 0.71 MG/DL (ref 0.2–1)
BUN SERPL-MCNC: 13 MG/DL (ref 5–25)
CALCIUM SERPL-MCNC: 9 MG/DL (ref 8.3–10.1)
CHLORIDE SERPL-SCNC: 106 MMOL/L (ref 96–108)
CHOLEST SERPL-MCNC: 217 MG/DL
CO2 SERPL-SCNC: 28 MMOL/L (ref 21–32)
CREAT SERPL-MCNC: 0.83 MG/DL (ref 0.6–1.3)
EOSINOPHIL # BLD AUTO: 0.07 THOUSAND/ΜL (ref 0–0.61)
EOSINOPHIL NFR BLD AUTO: 2 % (ref 0–6)
ERYTHROCYTE [DISTWIDTH] IN BLOOD BY AUTOMATED COUNT: 15.9 % (ref 11.6–15.1)
EST. AVERAGE GLUCOSE BLD GHB EST-MCNC: 137 MG/DL
GFR SERPL CREATININE-BSD FRML MDRD: 88 ML/MIN/1.73SQ M
GLUCOSE P FAST SERPL-MCNC: 114 MG/DL (ref 65–99)
HBA1C MFR BLD: 6.4 %
HCT VFR BLD AUTO: 41.3 % (ref 36.5–49.3)
HDLC SERPL-MCNC: 107 MG/DL
HGB BLD-MCNC: 12.7 G/DL (ref 12–17)
IMM GRANULOCYTES # BLD AUTO: 0.01 THOUSAND/UL (ref 0–0.2)
IMM GRANULOCYTES NFR BLD AUTO: 0 % (ref 0–2)
LDLC SERPL CALC-MCNC: 99 MG/DL (ref 0–100)
LYMPHOCYTES # BLD AUTO: 1.72 THOUSANDS/ΜL (ref 0.6–4.47)
LYMPHOCYTES NFR BLD AUTO: 38 % (ref 14–44)
MCH RBC QN AUTO: 24.3 PG (ref 26.8–34.3)
MCHC RBC AUTO-ENTMCNC: 30.8 G/DL (ref 31.4–37.4)
MCV RBC AUTO: 79 FL (ref 82–98)
MONOCYTES # BLD AUTO: 0.51 THOUSAND/ΜL (ref 0.17–1.22)
MONOCYTES NFR BLD AUTO: 11 % (ref 4–12)
NEUTROPHILS # BLD AUTO: 2.14 THOUSANDS/ΜL (ref 1.85–7.62)
NEUTS SEG NFR BLD AUTO: 48 % (ref 43–75)
NONHDLC SERPL-MCNC: 110 MG/DL
NRBC BLD AUTO-RTO: 0 /100 WBCS
PLATELET # BLD AUTO: 224 THOUSANDS/UL (ref 149–390)
PMV BLD AUTO: 11.2 FL (ref 8.9–12.7)
POTASSIUM SERPL-SCNC: 3.5 MMOL/L (ref 3.5–5.3)
PROT SERPL-MCNC: 7.7 G/DL (ref 6.4–8.4)
RBC # BLD AUTO: 5.22 MILLION/UL (ref 3.88–5.62)
SODIUM SERPL-SCNC: 136 MMOL/L (ref 135–147)
TRIGL SERPL-MCNC: 54 MG/DL
WBC # BLD AUTO: 4.5 THOUSAND/UL (ref 4.31–10.16)

## 2022-08-12 PROCEDURE — 36415 COLL VENOUS BLD VENIPUNCTURE: CPT

## 2022-08-12 PROCEDURE — 83036 HEMOGLOBIN GLYCOSYLATED A1C: CPT

## 2022-08-12 PROCEDURE — 85025 COMPLETE CBC W/AUTO DIFF WBC: CPT

## 2022-08-12 PROCEDURE — 80061 LIPID PANEL: CPT

## 2022-08-12 PROCEDURE — 80053 COMPREHEN METABOLIC PANEL: CPT

## 2022-08-12 RX ORDER — AMLODIPINE BESYLATE 10 MG/1
TABLET ORAL
Qty: 90 TABLET | Refills: 0 | Status: SHIPPED | OUTPATIENT
Start: 2022-08-12 | End: 2022-08-16 | Stop reason: SDUPTHER

## 2022-08-16 ENCOUNTER — OFFICE VISIT (OUTPATIENT)
Dept: FAMILY MEDICINE CLINIC | Facility: CLINIC | Age: 72
End: 2022-08-16
Payer: MEDICARE

## 2022-08-16 VITALS
HEART RATE: 62 BPM | RESPIRATION RATE: 16 BRPM | BODY MASS INDEX: 27.99 KG/M2 | WEIGHT: 189 LBS | SYSTOLIC BLOOD PRESSURE: 130 MMHG | HEIGHT: 69 IN | DIASTOLIC BLOOD PRESSURE: 70 MMHG | OXYGEN SATURATION: 100 %

## 2022-08-16 DIAGNOSIS — E78.2 MIXED HYPERLIPIDEMIA: Primary | ICD-10-CM

## 2022-08-16 DIAGNOSIS — I10 ESSENTIAL HYPERTENSION: ICD-10-CM

## 2022-08-16 DIAGNOSIS — E11.9 TYPE 2 DIABETES MELLITUS WITHOUT COMPLICATION, WITHOUT LONG-TERM CURRENT USE OF INSULIN (HCC): ICD-10-CM

## 2022-08-16 PROCEDURE — 99214 OFFICE O/P EST MOD 30 MIN: CPT | Performed by: FAMILY MEDICINE

## 2022-08-16 RX ORDER — SIMVASTATIN 10 MG
10 TABLET ORAL
Qty: 90 TABLET | Refills: 3 | Status: SHIPPED | OUTPATIENT
Start: 2022-08-16

## 2022-08-16 RX ORDER — AMLODIPINE BESYLATE 10 MG/1
10 TABLET ORAL DAILY
Qty: 90 TABLET | Refills: 3 | Status: SHIPPED | OUTPATIENT
Start: 2022-08-16

## 2022-08-16 NOTE — PROGRESS NOTES
Assessment/Plan:    Problem List Items Addressed This Visit        Endocrine    Type 2 diabetes mellitus without complication ([facility] [address] )       Discussed about continue diabetic diet, A1c has gone up slightly, will recheck labs in 4 months         Relevant Orders    CBC and differential    Comprehensive metabolic panel    Hemoglobin A1C    Lipid panel    Microalbumin / creatinine urine ratio       Cardiovascular and Mediastinum    Essential hypertension    Relevant Medications    amLODIPine (NORVASC) 10 mg tablet       Other    Mixed hyperlipidemia - Primary    Relevant Medications    simvastatin (ZOCOR) 10 mg tablet    Other Relevant Orders    Lipid panel          Return in about 4 months (around 12/16/2022) for Recheck  Chief Complaint   Patient presents with    Follow-up       Subjective:   Patient ID: Kamran Thakur is a 71 y o  male  Diabetes  He presents for his follow-up diabetic visit  He has type 2 diabetes mellitus  His disease course has been stable  There are no hypoglycemic associated symptoms  There are no diabetic associated symptoms  Pertinent negatives for diabetes include no blurred vision and no chest pain  There are no hypoglycemic complications  Symptoms are stable  There are no diabetic complications  Pertinent negatives for diabetic complications include no CVA  Risk factors for coronary artery disease include dyslipidemia, diabetes mellitus and male sex  Current diabetic treatment includes oral agent (monotherapy)  He is compliant with treatment all of the time  His weight is stable  He is following a generally healthy diet  There is no change in his home blood glucose trend  Eye exam is current  Hyperlipidemia  This is a chronic problem  Recent lipid tests were reviewed and are normal  Exacerbating diseases include diabetes  Pertinent negatives include no chest pain, focal sensory loss, focal weakness, leg pain, myalgias or shortness of breath   Current antihyperlipidemic treatment includes exercise and statins  The current treatment provides significant improvement of lipids  There are no compliance problems  Risk factors for coronary artery disease include male sex  Hypertension  This is a chronic problem  The problem is unchanged  The problem is controlled  Pertinent negatives include no anxiety, blurred vision, chest pain, malaise/fatigue, neck pain, palpitations, peripheral edema or shortness of breath  There are no associated agents to hypertension  Past treatments include calcium channel blockers  The current treatment provides significant improvement  There is no history of angina or CVA  Review of Systems   Constitutional: Negative for malaise/fatigue  Eyes: Negative for blurred vision  Respiratory: Negative for shortness of breath  Cardiovascular: Negative for chest pain and palpitations  Musculoskeletal: Negative for myalgias and neck pain  Neurological: Negative for focal weakness  Objective:  Physical Exam  Vitals and nursing note reviewed  Constitutional:       Appearance: Normal appearance  He is well-developed  He is not ill-appearing  HENT:      Head: Normocephalic and atraumatic  Right Ear: External ear normal       Left Ear: External ear normal    Eyes:      General: No scleral icterus  Right eye: No discharge  Left eye: No discharge  Extraocular Movements: Extraocular movements intact  Neck:      Thyroid: No thyromegaly  Trachea: No tracheal deviation  Cardiovascular:      Rate and Rhythm: Normal rate and regular rhythm  Heart sounds: Normal heart sounds  No murmur heard  Pulmonary:      Effort: Pulmonary effort is normal  No respiratory distress  Breath sounds: Normal breath sounds  No wheezing or rales  Abdominal:      General: Bowel sounds are normal  There is no distension  Palpations: Abdomen is soft  There is no mass  Tenderness: There is no abdominal tenderness  There is no rebound  Musculoskeletal:         General: Normal range of motion  Cervical back: Normal range of motion and neck supple  Right lower leg: No edema  Left lower leg: No edema  Skin:     General: Skin is warm  Coloration: Skin is not pale  Findings: No erythema or rash  Neurological:      Mental Status: He is alert and oriented to person, place, and time  Cranial Nerves: No cranial nerve deficit  Deep Tendon Reflexes: Reflexes are normal and symmetric  Psychiatric:         Mood and Affect: Mood normal          Behavior: Behavior normal          Thought Content: Thought content normal          Judgment: Judgment normal             Past Surgical History:   Procedure Laterality Date    COLONOSCOPY      9 years ago    [place] COLONOSCOPY FLX DX W/COLLJ SPEC WHEN PFRMD N/A 6/25/2018    Procedure: COLONOSCOPY;  Surgeon: Ashok Mcgowan MD;  Location: [facility]; Service: Gastroenterology       Family History   Problem Relation Age of Onset    Diabetes Mother     Hypertension Mother     Heart disease Father         cardiac disorder    Diabetes Father     Hypertension Father     Prostate cancer Father     Diabetes Sister     Hypertension Sister     Multiple myeloma Brother          Current Outpatient Medications:     amLODIPine (NORVASC) 10 mg tablet, Take 1 tablet (10 mg total) by mouth daily, Disp: 90 tablet, Rfl: 3    Barberry-Oreg Grape-Goldenseal (BERBERINE COMPLEX PO), Take by mouth, Disp: , Rfl:     calcium carbonate-vitamin D (OSCAL-D) 500 mg-200 units per tablet, Take 1 tablet by mouth daily, Disp: , Rfl:     Coenzyme Q10 (COQ10) 100 MG CAPS, Take by mouth, Disp: , Rfl:     Cyanocobalamin (B-12 PO), Take by mouth, Disp: , Rfl:     latanoprost (XALATAN) 0 005 % ophthalmic solution, INSTILL 1 DROP INTO BOTH EYES AT BEDTIME   FILL ON 01-09-18, Disp: , Rfl: 0    MEGARED OMEGA-3 KRILL  MG CAPS, Take by mouth, Disp: , Rfl:     metFORMIN (GLUCOPHAGE) 850 mg tablet, Take 1 tablet (850 mg total) by mouth daily with breakfast, Disp: 90 tablet, Rfl: 3    multivitamin (THERAGRAN) TABS, Take 1 tablet by mouth daily, Disp: , Rfl:     NADH POWD, Take 250 mg by mouth, Disp: , Rfl:     NON FORMULARY, 2 (two) times a day CBD OIL, Disp: , Rfl:     Saw Palmetto, Serenoa repens, 450 MG CAPS, Take by mouth, Disp: , Rfl:     simvastatin (ZOCOR) 10 mg tablet, Take 1 tablet (10 mg total) by mouth daily at bedtime, Disp: 90 tablet, Rfl: 3    Allergies   Allergen Reactions    Lactate Diarrhea       Vitals:    08/16/22 0810   BP: 130/70   Pulse: 62   Resp: 16   SpO2: 100%   Weight: 85 7 kg (189 lb)   Height: 5' 9" (1 753 m)

## 2022-08-25 ENCOUNTER — APPOINTMENT (OUTPATIENT)
Dept: LAB | Facility: AMBULARY SURGERY CENTER | Age: 72
End: 2022-08-25
Payer: MEDICARE

## 2022-08-25 DIAGNOSIS — C61 PROSTATE CANCER (HCC): ICD-10-CM

## 2022-08-25 LAB
CREAT UR-MCNC: 49.3 MG/DL
MICROALBUMIN UR-MCNC: <5 MG/L (ref 0–20)
MICROALBUMIN/CREAT 24H UR: <10 MG/G CREATININE (ref 0–30)
PSA SERPL-MCNC: 1.1 NG/ML (ref 0–4)

## 2022-08-25 PROCEDURE — 84153 ASSAY OF PSA TOTAL: CPT

## 2022-08-25 PROCEDURE — 82570 ASSAY OF URINE CREATININE: CPT | Performed by: FAMILY MEDICINE

## 2022-08-25 PROCEDURE — 82043 UR ALBUMIN QUANTITATIVE: CPT | Performed by: FAMILY MEDICINE

## 2022-08-29 ENCOUNTER — OFFICE VISIT (OUTPATIENT)
Dept: UROLOGY | Facility: CLINIC | Age: 72
End: 2022-08-29
Payer: MEDICARE

## 2022-08-29 ENCOUNTER — TELEPHONE (OUTPATIENT)
Dept: UROLOGY | Facility: CLINIC | Age: 72
End: 2022-08-29

## 2022-08-29 VITALS
HEIGHT: 69 IN | BODY MASS INDEX: 28.23 KG/M2 | DIASTOLIC BLOOD PRESSURE: 80 MMHG | WEIGHT: 190.6 LBS | HEART RATE: 70 BPM | OXYGEN SATURATION: 99 % | SYSTOLIC BLOOD PRESSURE: 120 MMHG

## 2022-08-29 DIAGNOSIS — C61 PROSTATE CANCER (HCC): Primary | ICD-10-CM

## 2022-08-29 PROCEDURE — 99213 OFFICE O/P EST LOW 20 MIN: CPT | Performed by: PHYSICIAN ASSISTANT

## 2022-08-30 NOTE — PROGRESS NOTES
UROLOGY PROGRESS NOTE   Patient Identifiers: Kamran Thakur (MRN [id number])  Date of Service: 8/30/2022    Subjective:    56-year-old man with history of metastatic [place] 6 prostate cancer  He was originally treated in [facility] around 2004 with radiation and 6 months of ADT  His PSA went up to 6 0 and he received Lupron 45 mg  PSA now 1 1  No bone pain or weight loss  Reason for visit:  Prostate cancer follow-up      Objective:     VITALS:    Vitals:    08/29/22 1033   BP: 120/80   Pulse: 70   SpO2: 99%           LABS:  Lab Results   Component Value Date    HGB 12 7 08/12/2022    HCT 41 3 08/12/2022    WBC 4 50 08/12/2022     08/12/2022   ]    Lab Results   Component Value Date    K 3 5 08/12/2022     08/12/2022    CO2 28 08/12/2022    BUN 13 08/12/2022    CREATININE 0 83 08/12/2022    CALCIUM 9 0 08/12/2022   ]        INPATIENT MEDS:    Current Outpatient Medications:     amLODIPine (NORVASC) 10 mg tablet, Take 1 tablet (10 mg total) by mouth daily, Disp: 90 tablet, Rfl: 3    Barberry-Oreg Grape-Goldenseal (BERBERINE COMPLEX PO), Take by mouth, Disp: , Rfl:     calcium carbonate-vitamin D (OSCAL-D) 500 mg-200 units per tablet, Take 1 tablet by mouth daily, Disp: , Rfl:     Coenzyme Q10 (COQ10) 100 MG CAPS, Take by mouth, Disp: , Rfl:     Cyanocobalamin (B-12 PO), Take by mouth, Disp: , Rfl:     latanoprost (XALATAN) 0 005 % ophthalmic solution, INSTILL 1 DROP INTO BOTH EYES AT BEDTIME   FILL ON 01-09-18, Disp: , Rfl: 0    MEGARED OMEGA-3 KRILL  MG CAPS, Take by mouth, Disp: , Rfl:     metFORMIN (GLUCOPHAGE) 850 mg tablet, Take 1 tablet (850 mg total) by mouth daily with breakfast, Disp: 90 tablet, Rfl: 3    multivitamin (THERAGRAN) TABS, Take 1 tablet by mouth daily, Disp: , Rfl:     NADH POWD, Take 250 mg by mouth, Disp: , Rfl:     NON FORMULARY, 2 (two) times a day CBD OIL, Disp: , Rfl:     Saw Palmetto, Serenoa repens, 450 MG CAPS, Take by mouth, Disp: , Rfl:    simvastatin (ZOCOR) 10 mg tablet, Take 1 tablet (10 mg total) by mouth daily at bedtime, Disp: 90 tablet, Rfl: 3      Physical Exam:   /80 (BP Location: Left arm, Patient Position: Sitting, Cuff Size: Standard)   Pulse 70   Ht 5' 9" (1 753 m)   Wt 86 5 kg (190 lb 9 6 oz)   SpO2 99%   BMI 28 15 kg/m²   GEN: no acute distress    RESP: breathing comfortably with no accessory muscle use    ABD: soft, non-tender, non-distended   INCISION:    EXT: no significant peripheral edema     RADIOLOGY:   None     Assessment:   1   Prostate cancer     Plan:   -3 month follow-up with PSMA PET scan and PSA prior to visit  -he is leaving for [place] in late November and will be back until April  -if his PSA goes up he will likely get another Lupron also depending on the results of his PET scan  -

## 2022-11-02 ENCOUNTER — HOSPITAL ENCOUNTER (OUTPATIENT)
Dept: RADIOLOGY | Age: 72
Discharge: HOME | End: 2022-11-02

## 2022-11-02 DIAGNOSIS — C61 PROSTATE CANCER (HCC): ICD-10-CM

## 2022-11-18 ENCOUNTER — APPOINTMENT (OUTPATIENT)
Dept: LAB | Facility: AMBULARY SURGERY CENTER | Age: 72
End: 2022-11-18

## 2022-11-18 DIAGNOSIS — C61 PROSTATE CANCER (HCC): ICD-10-CM

## 2022-11-18 DIAGNOSIS — E78.2 MIXED HYPERLIPIDEMIA: ICD-10-CM

## 2022-11-18 DIAGNOSIS — E11.9 TYPE 2 DIABETES MELLITUS WITHOUT COMPLICATION, WITHOUT LONG-TERM CURRENT USE OF INSULIN (HCC): ICD-10-CM

## 2022-11-18 LAB
ALBUMIN SERPL BCP-MCNC: 3.7 G/DL (ref 3.5–5)
ALP SERPL-CCNC: 113 U/L (ref 46–116)
ALT SERPL W P-5'-P-CCNC: 26 U/L (ref 12–78)
ANION GAP SERPL CALCULATED.3IONS-SCNC: 2 MMOL/L (ref 4–13)
AST SERPL W P-5'-P-CCNC: 26 U/L (ref 5–45)
BASOPHILS # BLD AUTO: 0.04 THOUSANDS/ÂΜL (ref 0–0.1)
BASOPHILS NFR BLD AUTO: 1 % (ref 0–1)
BILIRUB SERPL-MCNC: 0.45 MG/DL (ref 0.2–1)
BUN SERPL-MCNC: 14 MG/DL (ref 5–25)
CALCIUM SERPL-MCNC: 9.4 MG/DL (ref 8.3–10.1)
CHLORIDE SERPL-SCNC: 108 MMOL/L (ref 96–108)
CHOLEST SERPL-MCNC: 196 MG/DL
CO2 SERPL-SCNC: 30 MMOL/L (ref 21–32)
CREAT SERPL-MCNC: 0.78 MG/DL (ref 0.6–1.3)
EOSINOPHIL # BLD AUTO: 0.08 THOUSAND/ÂΜL (ref 0–0.61)
EOSINOPHIL NFR BLD AUTO: 2 % (ref 0–6)
ERYTHROCYTE [DISTWIDTH] IN BLOOD BY AUTOMATED COUNT: 15.7 % (ref 11.6–15.1)
EST. AVERAGE GLUCOSE BLD GHB EST-MCNC: 123 MG/DL
GFR SERPL CREATININE-BSD FRML MDRD: 90 ML/MIN/1.73SQ M
GLUCOSE P FAST SERPL-MCNC: 116 MG/DL (ref 65–99)
HBA1C MFR BLD: 5.9 %
HCT VFR BLD AUTO: 45.1 % (ref 36.5–49.3)
HDLC SERPL-MCNC: 107 MG/DL
HGB BLD-MCNC: 13.6 G/DL (ref 12–17)
IMM GRANULOCYTES # BLD AUTO: 0.01 THOUSAND/UL (ref 0–0.2)
IMM GRANULOCYTES NFR BLD AUTO: 0 % (ref 0–2)
LDLC SERPL CALC-MCNC: 77 MG/DL (ref 0–100)
LYMPHOCYTES # BLD AUTO: 1.45 THOUSANDS/ÂΜL (ref 0.6–4.47)
LYMPHOCYTES NFR BLD AUTO: 34 % (ref 14–44)
MCH RBC QN AUTO: 24.6 PG (ref 26.8–34.3)
MCHC RBC AUTO-ENTMCNC: 30.2 G/DL (ref 31.4–37.4)
MCV RBC AUTO: 82 FL (ref 82–98)
MONOCYTES # BLD AUTO: 0.44 THOUSAND/ÂΜL (ref 0.17–1.22)
MONOCYTES NFR BLD AUTO: 10 % (ref 4–12)
NEUTROPHILS # BLD AUTO: 2.26 THOUSANDS/ÂΜL (ref 1.85–7.62)
NEUTS SEG NFR BLD AUTO: 53 % (ref 43–75)
NONHDLC SERPL-MCNC: 89 MG/DL
NRBC BLD AUTO-RTO: 0 /100 WBCS
PLATELET # BLD AUTO: 245 THOUSANDS/UL (ref 149–390)
PMV BLD AUTO: 11 FL (ref 8.9–12.7)
POTASSIUM SERPL-SCNC: 4.4 MMOL/L (ref 3.5–5.3)
PROT SERPL-MCNC: 8 G/DL (ref 6.4–8.4)
PSA SERPL-MCNC: 0.8 NG/ML (ref 0–4)
RBC # BLD AUTO: 5.52 MILLION/UL (ref 3.88–5.62)
SODIUM SERPL-SCNC: 140 MMOL/L (ref 135–147)
TRIGL SERPL-MCNC: 58 MG/DL
WBC # BLD AUTO: 4.28 THOUSAND/UL (ref 4.31–10.16)

## 2022-11-21 ENCOUNTER — OFFICE VISIT (OUTPATIENT)
Dept: FAMILY MEDICINE CLINIC | Facility: CLINIC | Age: 72
End: 2022-11-21

## 2022-11-21 VITALS
WEIGHT: 189 LBS | BODY MASS INDEX: 27.99 KG/M2 | RESPIRATION RATE: 16 BRPM | OXYGEN SATURATION: 100 % | HEIGHT: 69 IN | HEART RATE: 72 BPM | DIASTOLIC BLOOD PRESSURE: 70 MMHG | SYSTOLIC BLOOD PRESSURE: 134 MMHG

## 2022-11-21 DIAGNOSIS — I10 ESSENTIAL HYPERTENSION: Primary | ICD-10-CM

## 2022-11-21 DIAGNOSIS — E78.2 MIXED HYPERLIPIDEMIA: ICD-10-CM

## 2022-11-21 DIAGNOSIS — E11.9 TYPE 2 DIABETES MELLITUS WITHOUT COMPLICATION, WITHOUT LONG-TERM CURRENT USE OF INSULIN (HCC): ICD-10-CM

## 2022-11-21 DIAGNOSIS — C79.51 NEOPLASM OF PROSTATE, DISTANT METASTASIS STAGING CATEGORY M1B: METASTASIS TO BONE (HCC): ICD-10-CM

## 2022-11-21 DIAGNOSIS — C61 NEOPLASM OF PROSTATE, DISTANT METASTASIS STAGING CATEGORY M1B: METASTASIS TO BONE (HCC): ICD-10-CM

## 2022-11-21 RX ORDER — SIMVASTATIN 10 MG
10 TABLET ORAL
Qty: 90 TABLET | Refills: 3 | Status: SHIPPED | OUTPATIENT
Start: 2022-11-21

## 2022-11-21 RX ORDER — AMLODIPINE BESYLATE 10 MG/1
10 TABLET ORAL DAILY
Qty: 90 TABLET | Refills: 3 | Status: SHIPPED | OUTPATIENT
Start: 2022-11-21

## 2022-11-21 NOTE — ASSESSMENT & PLAN NOTE
Lab Results   Component Value Date    HGBA1C 5 9 (H) 11/18/2022   Diabetes well controlled, continue exercise and diabetic diet, he wants continuous blood glucose monitor to monitor his blood sugar, so he wants to know which food increase his blood sugar so we can control better

## 2022-11-21 NOTE — PATIENT INSTRUCTIONS

## 2022-11-21 NOTE — PROGRESS NOTES
Name: Monika Lucero      : 1950      MRN: [id number]  Encounter Provider: Sydney Carlisle MD  Encounter Date: 2022   Encounter department: [facility]     1  Essential hypertension  -     amLODIPine (NORVASC) 10 mg tablet; Take 1 tablet (10 mg total) by mouth daily    2  Type 2 diabetes mellitus without complication, without long-term current use of insulin (HCC)  Assessment & Plan:    Lab Results   Component Value Date    HGBA1C 5 9 (H) 2022   Diabetes well controlled, continue exercise and diabetic diet, he wants continuous blood glucose monitor to monitor his blood sugar, so he wants to know which food increase his blood sugar so we can control better    Orders:  -     Continous glucose monitoring sean placement; Future  -     Continous glucose monitoring sean intrepretation; Future  -     metFORMIN (GLUCOPHAGE) 850 mg tablet; Take 1 tablet (850 mg total) by mouth daily with breakfast  -     CBC and Platelet; Future; Expected date: 2023  -     TSH, 3rd generation with Free T4 reflex; Future; Expected date: 2023  -     Hemoglobin A1c (w/out EAG) ([facility] ONLY); Future; Expected date: 2023  -     Microalbumin, Random Urine (W/Creatinine) ([facility] ONLY); Future; Expected date: 2023  -     Comprehensive metabolic panel; Future; Expected date: 2023    3  Mixed hyperlipidemia  Assessment & Plan:  Lab Results   Component Value Date    LDLCALC 77 2022   Continue same medication      Orders:  -     simvastatin (ZOCOR) 10 mg tablet; Take 1 tablet (10 mg total) by mouth daily at bedtime    4  Neoplasm of prostate, 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 [facility])        Depression Screening and Follow-up Plan: Patient was screened for depression during today's encounter  They screened negative with a PHQ-2 score of 0        He will follow with the urologist for his choke on prostate cancer, he has been stable    Subjective     He is here for follow-up, he has appointment with urologist next week, for history of prostate cancer with metastasis, and he has recent PET scan, overall he is doing well, is leaving for [place] for his occasion, and wants his all medications updated,    Review of Systems   Constitutional: Negative for activity change, appetite change, chills, fatigue, fever and unexpected weight change  HENT: Negative for congestion, ear discharge, ear pain, nosebleeds, postnasal drip, rhinorrhea, sinus pressure, sneezing, sore throat, trouble swallowing and voice change  Eyes: Negative for photophobia, pain, discharge, redness and itching  Respiratory: Negative for cough, chest tightness, shortness of breath and wheezing  Cardiovascular: Negative for chest pain, palpitations and leg swelling  Gastrointestinal: Negative for abdominal pain, constipation, diarrhea, nausea and vomiting  Endocrine: Negative for polyuria  Genitourinary: Negative for dysuria, frequency and urgency  Musculoskeletal: Negative for arthralgias, back pain, myalgias and neck pain  Skin: Negative for color change, pallor and rash  Allergic/Immunologic: Negative for environmental allergies and food allergies  Neurological: Negative for dizziness, weakness, light-headedness and headaches  Hematological: Negative for adenopathy  Does not bruise/bleed easily  Psychiatric/Behavioral: Negative for behavioral problems  The patient is not nervous/anxious  Past Medical History:   Diagnosis Date   • Diabetes mellitus ([facility] )    • Hx of radiation therapy    • Hypertension    • [facility]    • Shingles 02/22/2018     Past Surgical History:   Procedure Laterality Date   • COLONOSCOPY      9 years ago   • [place] COLONOSCOPY FLX DX W/COLLJ SPEC WHEN PFRMD N/A 6/25/2018    Procedure: COLONOSCOPY;  Surgeon: Vanessa Dunbar MD;  Location: [facility];   Service: Gastroenterology     Family History Problem Relation Age of Onset   • Diabetes Mother    • Hypertension Mother    • Heart disease Father         cardiac disorder   • Diabetes Father    • Hypertension Father    • Prostate cancer Father    • Diabetes Sister    • Hypertension Sister    • Multiple myeloma Brother      Social History     Socioeconomic History   • Marital status: Single     Spouse name: None   • Number of children: None   • Years of education: None   • Highest education level: None   Occupational History   • Occupation: retired     Comment:    Tobacco Use   • Smoking status: Former     Types: Cigarettes     Quit date:      Years since quittin 9   • Smokeless tobacco: Never   Vaping Use   • Vaping Use: Never used   Substance and Sexual Activity   • Alcohol use: No     Alcohol/week: 2 0 standard drinks     Types: 2 Cans of beer per week     Comment: socially   • Drug use: No   • Sexual activity: None   Other Topics Concern   • None   Social History Narrative    Two children     Social Determinants of Health     Financial Resource Strain: Not on file   Food Insecurity: Not on file   Transportation Needs: Not on file   Physical Activity: Not on file   Stress: Not on file   Social Connections: Not on file   Intimate Partner Violence: Not on file   Housing Stability: Not on file     Current Outpatient Medications on File Prior to Visit   Medication Sig   • Barberry-Oreg Grape-Goldenseal (BERBERINE COMPLEX PO) Take by mouth   • calcium carbonate-vitamin D (OSCAL-D) 500 mg-200 units per tablet Take 1 tablet by mouth daily   • Coenzyme Q10 (COQ10) 100 MG CAPS Take by mouth   • Cyanocobalamin (B-12 PO) Take by mouth   • latanoprost (XALATAN) 0 005 % ophthalmic solution INSTILL 1 DROP INTO BOTH EYES AT BEDTIME   FILL ON 18   • MEGARED OMEGA-3 KRILL  MG CAPS Take by mouth   • multivitamin (THERAGRAN) TABS Take 1 tablet by mouth daily   • NADH POWD Take 250 mg by mouth   • NON FORMULARY 2 (two) times a day CBD OIL   • Saw Palmetto, Serenoa repens, 450 MG CAPS Take by mouth   • [DISCONTINUED] amLODIPine (NORVASC) 10 mg tablet Take 1 tablet (10 mg total) by mouth daily   • [DISCONTINUED] metFORMIN (GLUCOPHAGE) 850 mg tablet Take 1 tablet (850 mg total) by mouth daily with breakfast   • [DISCONTINUED] simvastatin (ZOCOR) 10 mg tablet Take 1 tablet (10 mg total) by mouth daily at bedtime     Allergies   Allergen Reactions   • Lactate Diarrhea     Immunization History   Administered Date(s) Administered   • COVID-19 PFIZER VACCINE 0 3 ML IM 03/19/2021, 04/12/2021, 10/20/2021       Objective     /70   Pulse 72   Resp 16   Ht 5' 9" (1 753 m)   Wt 85 7 kg (189 lb)   SpO2 100%   BMI 27 91 kg/m²     Physical Exam  Vitals and nursing note reviewed  Constitutional:       Appearance: He is well-developed and well-nourished  HENT:      Head: Normocephalic and atraumatic  Right Ear: External ear normal       Left Ear: External ear normal       Nose: Nose normal       Mouth/Throat:      Mouth: Oropharynx is clear and moist       Pharynx: No oropharyngeal exudate  Eyes:      General: No scleral icterus  Right eye: No discharge  Left eye: No discharge  Extraocular Movements: EOM normal       Conjunctiva/sclera: Conjunctivae normal       Pupils: Pupils are equal, round, and reactive to light  Neck:      Thyroid: No thyromegaly  Trachea: No tracheal deviation  Cardiovascular:      Rate and Rhythm: Normal rate and regular rhythm  Heart sounds: Normal heart sounds  No murmur heard  Pulmonary:      Effort: Pulmonary effort is normal  No respiratory distress  Breath sounds: Normal breath sounds  No wheezing or rales  Abdominal:      General: Bowel sounds are normal  There is no distension  Palpations: Abdomen is soft  There is no mass  Tenderness: There is no abdominal tenderness  There is no rebound  Musculoskeletal:         General: No edema  Normal range of motion  Cervical back: Normal range of motion and neck supple  Lymphadenopathy:      Cervical: No cervical adenopathy  Skin:     General: Skin is warm  Coloration: Skin is not pale  Findings: No erythema or rash  Neurological:      Mental Status: He is alert and oriented to person, place, and time  Cranial Nerves: No cranial nerve deficit  Deep Tendon Reflexes: Reflexes are normal and symmetric  Psychiatric:         Mood and Affect: Mood and affect normal          Behavior: Behavior normal          Thought Content:  Thought content normal          Judgment: Judgment normal        Araceli Robles MD

## 2022-11-28 ENCOUNTER — OFFICE VISIT (OUTPATIENT)
Dept: UROLOGY | Facility: CLINIC | Age: 72
End: 2022-11-28

## 2022-11-28 VITALS
DIASTOLIC BLOOD PRESSURE: 74 MMHG | BODY MASS INDEX: 27.99 KG/M2 | WEIGHT: 189 LBS | SYSTOLIC BLOOD PRESSURE: 134 MMHG | HEIGHT: 69 IN

## 2022-11-28 DIAGNOSIS — C61 PROSTATE CANCER (HCC): Primary | ICD-10-CM

## 2022-11-28 NOTE — PROGRESS NOTES
UROLOGY PROGRESS NOTE   Patient Identifiers: Bonny Rhodes (MRN [id number])  Date of Service: 11/28/2022    Subjective:    71-year-old man history of metastatic [place] 6 prostate cancer  He was originally treated in [facility] in 2004 with radiation and 6 months of ADT  His PSA went up to 6 and he received a six-month Lupron  The PSA did come down to 1 1  He has no bone pain or weight loss  His current PSA  is now 0 8  PET scan did show focal area of increased activity in the left posterior peripheral prostate zone concerning for residual or recurrent disease  There was also minimal asymmetric activity in the right greater than left seminal vesicle  Reason for visit:  Prostate cancer follow-up      Objective:     VITALS:    Vitals:    11/28/22 1125   BP: 134/74           LABS:  Lab Results   Component Value Date    HGB 13 6 11/18/2022    HCT 45 1 11/18/2022    WBC 4 28 (L) 11/18/2022     11/18/2022   ]    Lab Results   Component Value Date    K 4 4 11/18/2022     11/18/2022    CO2 30 11/18/2022    BUN 14 11/18/2022    CREATININE 0 78 11/18/2022    CALCIUM 9 4 11/18/2022   ]        INPATIENT MEDS:    Current Outpatient Medications:   •  amLODIPine (NORVASC) 10 mg tablet, Take 1 tablet (10 mg total) by mouth daily, Disp: 90 tablet, Rfl: 3  •  Barberry-Oreg Grape-Goldenseal (BERBERINE COMPLEX PO), Take by mouth, Disp: , Rfl:   •  calcium carbonate-vitamin D (OSCAL-D) 500 mg-200 units per tablet, Take 1 tablet by mouth daily, Disp: , Rfl:   •  Coenzyme Q10 (COQ10) 100 MG CAPS, Take by mouth, Disp: , Rfl:   •  Cyanocobalamin (B-12 PO), Take by mouth, Disp: , Rfl:   •  latanoprost (XALATAN) 0 005 % ophthalmic solution, INSTILL 1 DROP INTO BOTH EYES AT BEDTIME   FILL ON 01-09-18, Disp: , Rfl: 0  •  MEGARED OMEGA-3 KRILL  MG CAPS, Take by mouth, Disp: , Rfl:   •  metFORMIN (GLUCOPHAGE) 850 mg tablet, Take 1 tablet (850 mg total) by mouth daily with breakfast, Disp: 90 tablet, Rfl: 3  • multivitamin (THERAGRAN) TABS, Take 1 tablet by mouth daily, Disp: , Rfl:   •  NADH POWD, Take 250 mg by mouth, Disp: , Rfl:   •  NON FORMULARY, 2 (two) times a day CBD OIL, Disp: , Rfl:   •  Saw Palmetto, Serenoa repens, 450 MG CAPS, Take by mouth, Disp: , Rfl:   •  simvastatin (ZOCOR) 10 mg tablet, Take 1 tablet (10 mg total) by mouth daily at bedtime, Disp: 90 tablet, Rfl: 3      Physical Exam:   /74 (BP Location: Left arm, Patient Position: Sitting, Cuff Size: Adult)   Ht 5' 9" (1 753 m)   Wt 85 7 kg (189 lb)   BMI 27 91 kg/m²   GEN: no acute distress    RESP: breathing comfortably with no accessory muscle use    ABD: soft, non-tender, non-distended   INCISION:    EXT: no significant peripheral edema     RADIOLOGY:   PYLARIFY PSMA PET/CT SCAN     IMPRESSION:     1  Focal area of increased activity within the left posterior inferior peripheral prostate zone with SUV max of 4 0 concerning for an area of residual or recurrent disease  2  Minimally asymmetric seminal vesicle activity right greater than left  As the SUV max is only 2 3, and may be affected by intense bladder activity, of questionable significance  3  No evidence of radiotracer avid pelvic adenopathy or distant soft tissue metastatic disease  4  Slight asymmetry of pelvic bone activity right greater than left but SUV max remaining at most 1 5  The changes on CT are similar to 2017  Findings may represent essentially stable metastatic disease in the right hemipelvis versus Paget's disease  Assessment:   1   Metastatic prostate cancer     Plan:   -his PSA is 0 8 and stable  -he would like to follow-up when he returns from [place] and likely resume ADT  -I recommended if we do started then we commit for 2 years of continuous treatment  -

## 2023-03-29 ENCOUNTER — TELEPHONE (OUTPATIENT)
Dept: UROLOGY | Facility: CLINIC | Age: 73
End: 2023-03-29

## 2023-03-29 NOTE — TELEPHONE ENCOUNTER
Addended by: SILVIO MIDDLETON on: 9/27/2018 04:27 PM     Modules accepted: Orders     Called patient and left message  Patient is required to get a PSA done prior to his appointment,  The script is in the chart and they can go to any [facility] lab to have it done  If he is unable to get it done prior the patient will have to reschedule  I left the call centers number for any questions at [phone]    If patient can not have it done prior please assist in rescheduling the appointment

## 2023-04-04 ENCOUNTER — APPOINTMENT (OUTPATIENT)
Dept: LAB | Facility: AMBULARY SURGERY CENTER | Age: 73
End: 2023-04-04

## 2023-04-04 DIAGNOSIS — C61 PROSTATE CANCER (HCC): ICD-10-CM

## 2023-04-04 LAB — PSA SERPL-MCNC: 3.4 NG/ML (ref 0–4)

## 2023-04-06 ENCOUNTER — OFFICE VISIT (OUTPATIENT)
Dept: UROLOGY | Facility: CLINIC | Age: 73
End: 2023-04-06

## 2023-04-06 VITALS
OXYGEN SATURATION: 95 % | HEART RATE: 70 BPM | RESPIRATION RATE: 16 BRPM | BODY MASS INDEX: 28.88 KG/M2 | HEIGHT: 69 IN | SYSTOLIC BLOOD PRESSURE: 120 MMHG | DIASTOLIC BLOOD PRESSURE: 70 MMHG | WEIGHT: 195 LBS

## 2023-04-06 DIAGNOSIS — C61 PROSTATE CANCER (HCC): Primary | ICD-10-CM

## 2023-04-06 RX ORDER — TIMOLOL MALEATE 5 MG/ML
SOLUTION/ DROPS OPHTHALMIC
COMMUNITY
Start: 2023-02-21

## 2023-04-06 NOTE — PROGRESS NOTES
UROLOGY PROGRESS NOTE   Patient Identifiers: Julianna Bowie (MRN [id number])  Date of Service: 4/6/2023    Subjective:   78-year-old man with history of metastatic Kami 6 prostate cancer  He was originally treated in [facility] in 2004 with radiation and 6 months of ADT  His PSA went up and he received Lupron  PET scan did show a focal area of increased activity in the left posterior peripheral prostate zone  No activity in the left seminal vesicle  Last PSA was 0 8  Current PSA 3 4  He denies any bone pain or weight loss  He has no urinary symptoms  Reason for visit: Prostate cancer follow-up    Objective:     VITALS:    There were no vitals filed for this visit  LABS:  Lab Results   Component Value Date    HGB 13 6 11/18/2022    HCT 45 1 11/18/2022    WBC 4 28 (L) 11/18/2022     11/18/2022   ]    Lab Results   Component Value Date    K 4 4 11/18/2022     11/18/2022    CO2 30 11/18/2022    BUN 14 11/18/2022    CREATININE 0 78 11/18/2022    CALCIUM 9 4 11/18/2022   ]        INPATIENT MEDS:    Current Outpatient Medications:   •  amLODIPine (NORVASC) 10 mg tablet, Take 1 tablet (10 mg total) by mouth daily, Disp: 90 tablet, Rfl: 3  •  Barberry-Oreg Grape-Goldenseal (BERBERINE COMPLEX PO), Take by mouth, Disp: , Rfl:   •  calcium carbonate-vitamin D (OSCAL-D) 500 mg-200 units per tablet, Take 1 tablet by mouth daily, Disp: , Rfl:   •  Coenzyme Q10 (COQ10) 100 MG CAPS, Take by mouth, Disp: , Rfl:   •  Cyanocobalamin (B-12 PO), Take by mouth, Disp: , Rfl:   •  latanoprost (XALATAN) 0 005 % ophthalmic solution, INSTILL 1 DROP INTO BOTH EYES AT BEDTIME   FILL ON 01-09-18, Disp: , Rfl: 0  •  MEGARED OMEGA-3 KRILL  MG CAPS, Take by mouth, Disp: , Rfl:   •  metFORMIN (GLUCOPHAGE) 850 mg tablet, Take 1 tablet (850 mg total) by mouth daily with breakfast, Disp: 90 tablet, Rfl: 3  •  multivitamin (THERAGRAN) TABS, Take 1 tablet by mouth daily, Disp: , Rfl:   •  NADH POWD, Take 250 mg by mouth, Disp: , Rfl:   •  NON FORMULARY, 2 (two) times a day CBD OIL, Disp: , Rfl:   •  Saw Palmetto, Serenoa repens, 450 MG CAPS, Take by mouth, Disp: , Rfl:   •  simvastatin (ZOCOR) 10 mg tablet, Take 1 tablet (10 mg total) by mouth daily at bedtime, Disp: 90 tablet, Rfl: 3      Physical Exam:   There were no vitals taken for this visit  GEN: no acute distress    RESP: breathing comfortably with no accessory muscle use    ABD: soft, non-tender, non-distended   INCISION:    EXT: no significant peripheral edema       RADIOLOGY:   none     Assessment:   #1    Metastatic Kami 6 prostate cancer    Plan:   -We discussed his intermittent ADT plan  -We will take a shot today Lupron 45 mg was given  -Follow-up in 6 months with PSA prior to visit  -

## 2023-04-14 ENCOUNTER — RA CDI HCC (OUTPATIENT)
Dept: OTHER | Facility: HOSPITAL | Age: 73
End: 2023-04-14

## 2023-04-19 PROBLEM — Z12.11 SCREEN FOR COLON CANCER: Status: ACTIVE | Noted: 2018-11-12

## 2023-06-18 PROBLEM — Z12.11 SCREEN FOR COLON CANCER: Status: RESOLVED | Noted: 2018-11-12 | Resolved: 2023-06-18

## 2023-06-29 ENCOUNTER — PREP FOR PROCEDURE (OUTPATIENT)
Age: 73
End: 2023-06-29

## 2023-06-29 ENCOUNTER — TELEPHONE (OUTPATIENT)
Age: 73
End: 2023-06-29

## 2023-06-29 DIAGNOSIS — Z12.11 SCREENING FOR COLON CANCER: Primary | ICD-10-CM

## 2023-06-29 NOTE — TELEPHONE ENCOUNTER
Scheduled date of colonoscopy (as of today): 9/1/23  Physician performing colonoscopy: VALENTE  Location of colonoscopy: [facility]  Bowel prep reviewed with patient:  Instructions reviewed with patient by:  Clearances:     NOTE: DIABETIC    Prep instructions sent to patient via [employer] and e-mail

## 2023-08-21 ENCOUNTER — RA CDI HCC (OUTPATIENT)
Dept: OTHER | Facility: HOSPITAL | Age: 73
End: 2023-08-21

## 2023-08-21 NOTE — PROGRESS NOTES
[facility] coding opportunities       Chart reviewed, no opportunity found: CHART REVIEWED, NO OPPORTUNITY FOUND        Patients Insurance     Medicare Insurance: Medicare

## 2023-08-23 ENCOUNTER — TELEPHONE (OUTPATIENT)
Age: 73
End: 2023-08-23

## 2023-08-23 NOTE — TELEPHONE ENCOUNTER
Scheduled date of colonoscopy (as of today): 10/17/2023    Physician performing colonoscopy: Davis    [facility]    Bowel prep reviewed with patient:   Miralax/Dulcolax    Instructions reviewed with patient by: Patient has on hand, remains the same    Clearances: None  Diabetic Patient

## 2023-08-28 ENCOUNTER — APPOINTMENT (OUTPATIENT)
Dept: LAB | Facility: AMBULARY SURGERY CENTER | Age: 73
End: 2023-08-28
Payer: MEDICARE

## 2023-08-28 DIAGNOSIS — E11.9 TYPE 2 DIABETES MELLITUS WITHOUT COMPLICATION, WITHOUT LONG-TERM CURRENT USE OF INSULIN (HCC): ICD-10-CM

## 2023-08-28 LAB
ALBUMIN SERPL BCP-MCNC: 4.4 G/DL (ref 3.5–5)
ALP SERPL-CCNC: 100 U/L (ref 34–104)
ALT SERPL W P-5'-P-CCNC: 14 U/L (ref 7–52)
ANION GAP SERPL CALCULATED.3IONS-SCNC: 12 MMOL/L
AST SERPL W P-5'-P-CCNC: 24 U/L (ref 13–39)
BILIRUB SERPL-MCNC: 0.5 MG/DL (ref 0.2–1)
BUN SERPL-MCNC: 19 MG/DL (ref 5–25)
CALCIUM SERPL-MCNC: 9.5 MG/DL (ref 8.4–10.2)
CHLORIDE SERPL-SCNC: 104 MMOL/L (ref 96–108)
CO2 SERPL-SCNC: 25 MMOL/L (ref 21–32)
CREAT SERPL-MCNC: 0.74 MG/DL (ref 0.6–1.3)
CREAT UR-MCNC: 140.4 MG/DL
ERYTHROCYTE [DISTWIDTH] IN BLOOD BY AUTOMATED COUNT: 15.3 % (ref 11.6–15.1)
EST. AVERAGE GLUCOSE BLD GHB EST-MCNC: 143 MG/DL
GFR SERPL CREATININE-BSD FRML MDRD: 92 ML/MIN/1.73SQ M
GLUCOSE P FAST SERPL-MCNC: 111 MG/DL (ref 65–99)
HBA1C MFR BLD: 6.6 %
HCT VFR BLD AUTO: 41.2 % (ref 36.5–49.3)
HGB BLD-MCNC: 13.2 G/DL (ref 12–17)
MCH RBC QN AUTO: 25.5 PG (ref 26.8–34.3)
MCHC RBC AUTO-ENTMCNC: 32 G/DL (ref 31.4–37.4)
MCV RBC AUTO: 80 FL (ref 82–98)
MICROALBUMIN UR-MCNC: 42.8 MG/L
MICROALBUMIN/CREAT 24H UR: 30 MG/G CREATININE (ref 0–30)
PLATELET # BLD AUTO: 217 THOUSANDS/UL (ref 149–390)
PMV BLD AUTO: 10.9 FL (ref 8.9–12.7)
POTASSIUM SERPL-SCNC: 4 MMOL/L (ref 3.5–5.3)
PROT SERPL-MCNC: 7.2 G/DL (ref 6.4–8.4)
RBC # BLD AUTO: 5.17 MILLION/UL (ref 3.88–5.62)
SODIUM SERPL-SCNC: 141 MMOL/L (ref 135–147)
WBC # BLD AUTO: 4.51 THOUSAND/UL (ref 4.31–10.16)

## 2023-08-28 PROCEDURE — 83036 HEMOGLOBIN GLYCOSYLATED A1C: CPT

## 2023-08-28 PROCEDURE — 82570 ASSAY OF URINE CREATININE: CPT

## 2023-08-28 PROCEDURE — 82043 UR ALBUMIN QUANTITATIVE: CPT

## 2023-08-28 PROCEDURE — 80053 COMPREHEN METABOLIC PANEL: CPT

## 2023-08-28 PROCEDURE — 85027 COMPLETE CBC AUTOMATED: CPT

## 2023-08-28 PROCEDURE — 36415 COLL VENOUS BLD VENIPUNCTURE: CPT

## 2023-08-29 ENCOUNTER — OFFICE VISIT (OUTPATIENT)
Dept: FAMILY MEDICINE CLINIC | Facility: CLINIC | Age: 73
End: 2023-08-29
Payer: MEDICARE

## 2023-08-29 VITALS
SYSTOLIC BLOOD PRESSURE: 110 MMHG | WEIGHT: 194 LBS | RESPIRATION RATE: 16 BRPM | DIASTOLIC BLOOD PRESSURE: 62 MMHG | HEART RATE: 55 BPM | BODY MASS INDEX: 28.73 KG/M2 | HEIGHT: 69 IN | OXYGEN SATURATION: 97 %

## 2023-08-29 DIAGNOSIS — E11.9 TYPE 2 DIABETES MELLITUS WITHOUT COMPLICATION, WITHOUT LONG-TERM CURRENT USE OF INSULIN (HCC): Primary | ICD-10-CM

## 2023-08-29 DIAGNOSIS — H40.9 GLAUCOMA OF BOTH EYES, UNSPECIFIED GLAUCOMA TYPE: ICD-10-CM

## 2023-08-29 DIAGNOSIS — E78.2 MIXED HYPERLIPIDEMIA: ICD-10-CM

## 2023-08-29 DIAGNOSIS — I10 ESSENTIAL HYPERTENSION: ICD-10-CM

## 2023-08-29 PROCEDURE — 99214 OFFICE O/P EST MOD 30 MIN: CPT | Performed by: FAMILY MEDICINE

## 2023-08-29 RX ORDER — AMLODIPINE BESYLATE 10 MG/1
10 TABLET ORAL DAILY
Qty: 90 TABLET | Refills: 3 | Status: SHIPPED | OUTPATIENT
Start: 2023-08-29

## 2023-08-29 NOTE — PROGRESS NOTES
Name: Melecio Ramsay      : 1950      MRN: [id number]  Encounter Provider: Brody Tracy MD  Encounter Date: 2023   Encounter department: [address] [place] [address]     1. Type 2 diabetes mellitus without complication, without long-term current use of insulin ([facility])  Assessment & Plan:    Lab Results   Component Value Date    HGBA1C 6.6 (H) 2023   trend is going up and will increase metformin bid , low carb diet ,advised exercise     Orders:  -     IRIS Diabetic eye exam  -     Albumin / creatinine urine ratio; Future; Expected date: 2023  -     Comprehensive metabolic panel; Future; Expected date: 2023  -     Hemoglobin A1C; Future; Expected date: 2023  -     Lipid Panel with Direct LDL reflex; Future; Expected date: 2023  -     CBC and Platelet; Future; Expected date: 2023  -     metFORMIN (GLUCOPHAGE) 850 mg tablet; Take 1 tablet (850 mg total) by mouth 2 (two) times a day with meals  -     Continuous Blood Gluc Sensor (FreeStyle Zev 2 Sensor) MISC; Check blood sugars multiple times per day    2. Essential hypertension  Assessment & Plan:  Hypertension is stable, continue low-salt diet, continue same medications. Orders:  -     amLODIPine (NORVASC) 10 mg tablet; Take 1 tablet (10 mg total) by mouth daily    3. Glaucoma of both eyes, unspecified glaucoma type  Assessment & Plan:  Seen by ophthalmology and [place] and he was told he has suspect glaucoma given him the drops      4. Mixed hyperlipidemia  -     Lipid Panel with Direct LDL reflex; Future; Expected date: 2023      BMI Counseling: Body mass index is 28.65 kg/m². The BMI is above normal. Nutrition recommendations include decreasing portion sizes and moderation in carbohydrate intake. Exercise recommendations include exercising 3-5 times per week. Rationale for BMI follow-up plan is due to patient being overweight or obese.          Subjective     Follow-up on diabetes, hypertension, hyperlipidemia, history of prostate cancer he is on Lupron injections through the urologist, he denies any new symptoms he says sometimes he feels chills for few seconds, he denies any shortness of breath, he only takes 1 metformin and he do once a week fasting and he says lately he is not doing exercise his blood sugar is going up    Review of Systems   Constitutional: Negative for activity change, appetite change, chills, fatigue, fever and unexpected weight change. HENT: Negative for congestion, ear discharge, ear pain, nosebleeds, postnasal drip, rhinorrhea, sinus pressure, sneezing, sore throat, trouble swallowing and voice change. Eyes: Negative for photophobia, pain, discharge, redness and itching. Respiratory: Negative for cough, chest tightness, shortness of breath and wheezing. Cardiovascular: Negative for chest pain, palpitations and leg swelling. Gastrointestinal: Negative for abdominal pain, constipation, diarrhea, nausea and vomiting. Endocrine: Negative for polyuria. Genitourinary: Negative for dysuria, frequency and urgency. Musculoskeletal: Negative for arthralgias, back pain, myalgias and neck pain. Skin: Negative for color change, pallor and rash. Allergic/Immunologic: Negative for environmental allergies and food allergies. Neurological: Negative for dizziness, weakness, light-headedness and headaches. Hematological: Negative for adenopathy. Does not bruise/bleed easily. Psychiatric/Behavioral: Negative for behavioral problems. The patient is not nervous/anxious.         Past Medical History:   Diagnosis Date   • Diabetes mellitus ([address])    • Hx of radiation therapy    • Hypertension    • Malignant neoplasm of prostate ([address])    • Shingles 02/22/2018     Past Surgical History:   Procedure Laterality Date   • COLONOSCOPY      9 years ago   • VT COLONOSCOPY FLX DX W/COLLJ SPEC WHEN PFRMD N/A 6/25/2018    Procedure: COLONOSCOPY;  Surgeon: Law Byrd MD; Location[facility]; Service: Gastroenterology     Family History   Problem Relation Age of Onset   • Diabetes Mother    • Hypertension Mother    • Heart disease Father         cardiac disorder   • Diabetes Father    • Hypertension Father    • Prostate cancer Father    • Diabetes Sister    • Hypertension Sister    • Multiple myeloma Brother      Social History     Socioeconomic History   • Marital status: Single     Spouse name: None   • Number of children: None   • Years of education: None   • Highest education level: None   Occupational History   • Occupation: retired     Comment:    Tobacco Use   • Smoking status: Former     Types: Cigarettes     Quit date:      Years since quittin.7   • Smokeless tobacco: Never   Vaping Use   • Vaping Use: Never used   Substance and Sexual Activity   • Alcohol use: No     Alcohol/week: 2.0 standard drinks of alcohol     Types: 2 Cans of beer per week     Comment: socially   • Drug use: No   • Sexual activity: None   Other Topics Concern   • None   Social History Narrative    Two children     Social Determinants of Health     Financial Resource Strain: Not on file   Food Insecurity: Not on file   Transportation Needs: Not on file   Physical Activity: Not on file   Stress: Not on file   Social Connections: Not on file   Intimate Partner Violence: Not on file   Housing Stability: Not on file     Current Outpatient Medications on File Prior to Visit   Medication Sig   • Barberry-Oreg Grape-Goldenseal (BERBERINE COMPLEX PO) Take by mouth   • calcium carbonate-vitamin D (OSCAL-D) 500 mg-200 units per tablet Take 1 tablet by mouth daily   • Coenzyme Q10 (COQ10) 100 MG CAPS Take by mouth   • Cyanocobalamin (B-12 PO) Take by mouth   • latanoprost (XALATAN) 0.005 % ophthalmic solution INSTILL 1 DROP INTO BOTH EYES AT BEDTIME.  FILL ON 18   • MEGARED OMEGA-3 KRILL  MG CAPS Take by mouth   • multivitamin (THERAGRAN) TABS Take 1 tablet by mouth daily   • NADH POWD Take 250 mg by mouth   • NON FORMULARY 2 (two) times a day CBD OIL   • Saw Palmetto, Serenoa repens, 450 MG CAPS Take by mouth   • simvastatin (ZOCOR) 10 mg tablet Take 1 tablet (10 mg total) by mouth daily at bedtime   • timolol (TIMOPTIC) 0.5 % ophthalmic solution INSITLL 1 DROP INTO BOTH EYES EVERY MORNING   • [DISCONTINUED] amLODIPine (NORVASC) 10 mg tablet Take 1 tablet (10 mg total) by mouth daily   • [DISCONTINUED] metFORMIN (GLUCOPHAGE) 850 mg tablet Take 1 tablet (850 mg total) by mouth daily with breakfast     Allergies   Allergen Reactions   • Lactate Diarrhea     Immunization History   Administered Date(s) Administered   • COVID-19 PFIZER VACCINE 0.3 ML IM 03/19/2021, 04/12/2021, 10/20/2021       Objective     /62   Pulse 55   Resp 16   Ht 5' 9" (1.753 m)   Wt 88 kg (194 lb)   SpO2 97%   BMI 28.65 kg/m²     Physical Exam  Vitals and nursing note reviewed. Constitutional:       Appearance: Normal appearance. He is well-developed. He is not ill-appearing or diaphoretic. HENT:      Head: Normocephalic and atraumatic. Right Ear: Tympanic membrane and external ear normal. There is no impacted cerumen. Left Ear: Tympanic membrane and external ear normal. There is no impacted cerumen. Nose: Nose normal.      Mouth/Throat:      Mouth: Mucous membranes are moist.      Pharynx: No oropharyngeal exudate. Eyes:      General: No scleral icterus. Right eye: No discharge. Left eye: No discharge. Conjunctiva/sclera: Conjunctivae normal.      Pupils: Pupils are equal, round, and reactive to light. Neck:      Thyroid: No thyromegaly. Trachea: No tracheal deviation. Cardiovascular:      Rate and Rhythm: Normal rate and regular rhythm. Heart sounds: Normal heart sounds. No murmur heard. Pulmonary:      Effort: Pulmonary effort is normal. No respiratory distress. Breath sounds: Normal breath sounds. No wheezing or rales.    Abdominal: General: Bowel sounds are normal. There is no distension. Palpations: Abdomen is soft. There is no mass. Tenderness: There is no abdominal tenderness. There is no rebound. Musculoskeletal:         General: Normal range of motion. Cervical back: Normal range of motion and neck supple. Right lower leg: No edema. Left lower leg: No edema. Lymphadenopathy:      Cervical: No cervical adenopathy. Skin:     General: Skin is warm. Coloration: Skin is not pale. Findings: No erythema or rash. Neurological:      General: No focal deficit present. Mental Status: He is alert and oriented to person, place, and time. Cranial Nerves: No cranial nerve deficit. Deep Tendon Reflexes: Reflexes are normal and symmetric. Psychiatric:         Behavior: Behavior normal.         Thought Content:  Thought content normal.         Judgment: Judgment normal.       Inse Bernabe MD

## 2023-08-29 NOTE — PATIENT INSTRUCTIONS
Type 2 Diabetes Management for Adults   AMBULATORY CARE:   Type 2 diabetes  is a disease that affects how your body uses glucose (sugar). Either your body cannot make enough insulin, or it cannot use the insulin correctly. It is important to keep diabetes controlled to prevent damage to your heart, blood vessels, and other organs. Management will help you feel well and enjoy your daily activities. Your diabetes care team providers can help you make a plan to fit diabetes care into your schedule. Your plan can change over time to fit your needs and your family's needs. Have someone call your local emergency number (911 in the [address]) if:   • You cannot be woken. • You have signs of diabetic ketoacidosis:     ? confusion, fatigue    ? vomiting    ? rapid heartbeat    ? fruity smelling breath    ? extreme thirst    ? dry mouth and skin    • You have any of the following signs of a heart attack:      ? Squeezing, pressure, or pain in your chest    ? You may  also have any of the following:     - Discomfort or pain in your back, neck, jaw, stomach, or arm    - Shortness of breath    - Nausea or vomiting    - Lightheadedness or a sudden cold sweat    • You have any of the following signs of a stroke:      ? Numbness or drooping on one side of your face     ? Weakness in an arm or leg    ? Confusion or difficulty speaking    ? Dizziness, a severe headache, or vision loss    Call your doctor or diabetes care team provider if:   • You have a sore or wound that will not heal.    • You have a change in the amount you urinate. • Your blood sugar levels are higher than your target goals. • You often have lower blood sugar levels than your target goals. • Your skin is red, dry, warm, or swollen. • You have trouble coping with diabetes, or you feel anxious or depressed. • You have questions or concerns about your condition or care.     What you need to know about high blood sugar levels:  High blood sugar levels may not cause any symptoms. You may feel more thirsty or urinate more often than usual. Over time, high blood sugar levels can damage your nerves, blood vessels, tissues, and organs. The following can increase your blood sugar levels:  • Large meals or large amounts of carbohydrates at one time    • Less physical activity    • Stress    • Illness    • A lower dose of diabetes medicine or insulin, or a late dose    What you need to know about low blood sugar levels:  Symptoms include feeling shaky, dizzy, irritable, or confused. You can prevent symptoms by keeping your blood sugar levels from going too low. • Treat a low blood sugar level right away:      ? Drink 4 ounces of juice or have 1 tube of glucose gel. ? Check your blood sugar level again 10 to 15 minutes later. ? When the level goes back to normal, eat a meal or snack to prevent another decrease. • Keep glucose gel, raisins, or hard candy with you at all times to treat a low blood sugar level. • Your blood sugar level can get too low if you take diabetes medicine or insulin and do not eat enough food. • If you use insulin, check your blood sugar level before you exercise. ? If your blood sugar level is below 100 mg/dL, eat 4 crackers or 2 ounces of raisins, or drink 4 ounces of juice. ? Check your level every 30 minutes if you exercise longer than 1 hour. ? You may need a snack during or after exercise. What you can do to manage your blood sugar levels:   • Check your blood sugar levels as directed and as needed. Several items are available to use to check your levels. You may need to check by testing a drop of blood in a glucose monitor. You may instead be given a continuous glucose monitoring (CGM) device. The device is worn at all times. The CGM checks your blood sugar level every 5 minutes. It sends results to an electronic device such as a smart phone. A CGM can be used with or without an insulin pump.  You and your diabetes care team providers will decide on the best method for you. The goal for blood sugar levels before meals  is between 80 and 130 mg/dL and 2 hours after eating  is lower than 180 mg/dL. • Make healthy food choices. Work with a dietitian to develop a meal plan that works for you and your schedule. A dietitian can help you learn how to eat the right amount of carbohydrates during your meals and snacks. Carbohydrates can raise your blood sugar level if you eat too many at one time. Examples of foods that contain carbohydrates are breads, cereals, rice, pasta, and sweets. • Eat high-fiber foods as directed. Fiber helps improve blood sugar levels. Fiber also lowers your risk for heart disease and other problems diabetes can cause. Examples of high-fiber foods include vegetables, whole-grain bread, and beans such as ramos beans. Your dietitian can tell you how much fiber to have each day. • Get regular physical activity. Physical activity can help you get to your target blood sugar level goal and manage your weight. Get at least 150 minutes of moderate to vigorous aerobic physical activity each week. Do not miss more than 2 days in a row. Do not sit longer than 30 minutes at a time. Your healthcare provider can help you create an activity plan. The plan can include the best activities for you and can help you build your strength and endurance. • Maintain a healthy weight. Ask your team what a healthy weight is for you. A healthy weight can help you control diabetes and prevent heart disease. Ask your team to help you create a weight loss plan, if needed. Weight loss can help make a difference in managing diabetes. Your team will help you set a weight-loss goal, such as 10 to 15 pounds, or 5% of your extra weight. Together you and your team can set manageable weight loss goals. • Take your diabetes medicine or insulin as directed.   You may need diabetes medicine, insulin, or both to help control your blood sugar levels. Your healthcare provider will teach you how and when to take your diabetes medicine or insulin. You will also be taught about side effects oral diabetes medicine can cause. Insulin may be injected or given through a pump or pen. You and your providers will decide on the best method for you:    ? An insulin pump  is an implanted device that gives your insulin 24 hours a day. An insulin pump prevents the need for multiple insulin injections in a day. ? An insulin pen  is a device prefilled with the right amount of insulin. ? You and your family members will be taught how to draw up and give insulin  if this is the best method for you. Your providers will also teach you how to dispose of needles and syringes. ? You will learn how much insulin you need  and when to give it. You will be taught when not to give insulin. You will also be taught what to do if your blood sugar level drops too low. This may happen if you take insulin and do not eat the right amount of carbohydrates. More ways to manage type 2 diabetes:   • Wear medical alert identification. Wear medical alert jewelry or carry a card that says you have diabetes. Ask your provider where to get these items. • Do not smoke. Nicotine and other chemicals in cigarettes and cigars can cause lung and blood vessel damage. It also makes it more difficult to manage your diabetes. Ask your provider for information if you currently smoke and need help to quit. Do not use e-cigarettes or smokeless tobacco in place of cigarettes or to help you quit. They still contain nicotine. • Check your feet each day for cuts, scratches, calluses, or other wounds. Look for redness and swelling, and feel for warmth. Wear shoes that fit well. Check your shoes for rocks or other objects that can hurt your feet. Do not walk barefoot or wear shoes without socks.  Wear cotton socks to help keep your feet dry. • Ask about vaccines you may need. You have a higher risk for serious illness if you get the flu, pneumonia, COVID-19, or hepatitis. Ask your provider if you should get vaccines to prevent these or other diseases, and when to get the vaccines. • Talk to your provider if you become stressed about diabetes care. Sometimes being able to fit diabetes care into your life can cause increased stress. The stress can cause you not to take care of yourself properly. Your care team providers can help by offering tips about self-care. Your providers may suggest you talk to a mental health provider who can listen and offer help with self-care issues. • Have your A1c checked as directed. Your provider may check your A1c every 3 months, or 2 times each year if your diabetes is controlled. An A1c test shows the average amount of sugar in your blood over the past 2 to 3 months. Your provider will tell you what your A1c level should be. • Have screening tests as directed. Your provider may recommend screening for complications of diabetes and other conditions that may develop. Some screenings may begin right away and some may happen within the first 5 years of diagnosis:    ? Examples of diabetes complications  include kidney problems, high cholesterol, high blood pressure, blood vessel problems, eye problems, and sleep apnea. ? You may be screened for a low vitamin B level  if you take oral diabetes medicine for a long time. ? Women of childbearing years may be screened  for polycystic ovarian syndrome (PCOS). Follow up with your doctor or diabetes care team providers as directed: You may need to have blood tests done before your follow-up visit. The test results will show if changes need to be made in your treatment or self-care. Talk to your provider if you cannot afford your medicine. Write down your questions so you remember to ask them during your visits.   © Copyright [employer] 2022 Information is for End User's use only and may not be sold, redistributed or otherwise used for commercial purposes. The above information is an  only. It is not intended as medical advice for individual conditions or treatments. Talk to your doctor, nurse or pharmacist before following any medical regimen to see if it is safe and effective for you.

## 2023-08-29 NOTE — ASSESSMENT & PLAN NOTE
Lab Results   Component Value Date    HGBA1C 6.6 (H) 08/28/2023   trend is going up and will increase metformin bid , low carb diet ,advised exercise

## 2023-08-30 LAB
LEFT EYE DIABETIC RETINOPATHY: ABNORMAL
LEFT EYE IMAGE QUALITY: ABNORMAL
LEFT EYE MACULAR EDEMA: ABNORMAL
LEFT EYE OTHER RETINOPATHY: ABNORMAL
RIGHT EYE DIABETIC RETINOPATHY: ABNORMAL
RIGHT EYE IMAGE QUALITY: ABNORMAL
RIGHT EYE MACULAR EDEMA: ABNORMAL
RIGHT EYE OTHER RETINOPATHY: ABNORMAL
SEVERITY (EYE EXAM): ABNORMAL

## 2023-08-30 NOTE — RESULT ENCOUNTER NOTE
Please inform the patient is elevated no exam is normal, he has no diabetic retinopathy  He already know he has glaucoma as he was seen by another ophthalmologist and he is using the drops

## 2023-10-05 ENCOUNTER — APPOINTMENT (OUTPATIENT)
Dept: LAB | Facility: AMBULARY SURGERY CENTER | Age: 73
End: 2023-10-05
Payer: MEDICARE

## 2023-10-05 DIAGNOSIS — C61 PROSTATE CANCER (HCC): ICD-10-CM

## 2023-10-05 LAB — PSA SERPL-MCNC: 0.74 NG/ML (ref 0–4)

## 2023-10-05 PROCEDURE — 84153 ASSAY OF PSA TOTAL: CPT

## 2023-10-05 PROCEDURE — 36415 COLL VENOUS BLD VENIPUNCTURE: CPT

## 2023-10-09 ENCOUNTER — OFFICE VISIT (OUTPATIENT)
Dept: UROLOGY | Facility: CLINIC | Age: 73
End: 2023-10-09
Payer: MEDICARE

## 2023-10-09 VITALS
OXYGEN SATURATION: 98 % | HEIGHT: 69 IN | SYSTOLIC BLOOD PRESSURE: 156 MMHG | DIASTOLIC BLOOD PRESSURE: 84 MMHG | BODY MASS INDEX: 28.58 KG/M2 | HEART RATE: 65 BPM | WEIGHT: 193 LBS

## 2023-10-09 DIAGNOSIS — C61 PROSTATE CANCER (HCC): Primary | ICD-10-CM

## 2023-10-09 PROCEDURE — 99213 OFFICE O/P EST LOW 20 MIN: CPT | Performed by: PHYSICIAN ASSISTANT

## 2023-10-09 NOTE — PROGRESS NOTES
UROLOGY PROGRESS NOTE   Patient Identifiers: [place] (MRN [id number])  Date of Service: 10/9/2023    Subjective:   71-year-old man with history of metastatic prostate cancer. Treated in [place] in 2004 with radiation and ADT. PSA was 3.4. Current PSA 0.74. Last Lupron was given April 6. Reason for visit: Prostate cancer follow-up    Objective:     VITALS:    There were no vitals filed for this visit. LABS:  Lab Results   Component Value Date    HGB 13.2 08/28/2023    HCT 41.2 08/28/2023    WBC 4.51 08/28/2023     08/28/2023   ]    Lab Results   Component Value Date    K 4.0 08/28/2023     08/28/2023    CO2 25 08/28/2023    BUN 19 08/28/2023    CREATININE 0.74 08/28/2023    CALCIUM 9.5 08/28/2023   ]        INPATIENT MEDS:    Current Outpatient Medications:   •  amLODIPine (NORVASC) 10 mg tablet, Take 1 tablet (10 mg total) by mouth daily, Disp: 90 tablet, Rfl: 3  •  Barberry-Oreg Grape-Goldenseal (BERBERINE COMPLEX PO), Take by mouth, Disp: , Rfl:   •  calcium carbonate-vitamin D (OSCAL-D) 500 mg-200 units per tablet, Take 1 tablet by mouth daily, Disp: , Rfl:   •  Coenzyme Q10 (COQ10) 100 MG CAPS, Take by mouth, Disp: , Rfl:   •  Continuous Blood Gluc Sensor (FreeStyle Zev 2 Sensor) MISC, Check blood sugars multiple times per day, Disp: 6 each, Rfl: 3  •  Cyanocobalamin (B-12 PO), Take by mouth, Disp: , Rfl:   •  latanoprost (XALATAN) 0.005 % ophthalmic solution, INSTILL 1 DROP INTO BOTH EYES AT BEDTIME.  FILL ON 01-09-18, Disp: , Rfl: 0  •  MEGARED OMEGA-3 KRILL  MG CAPS, Take by mouth, Disp: , Rfl:   •  metFORMIN (GLUCOPHAGE) 850 mg tablet, Take 1 tablet (850 mg total) by mouth 2 (two) times a day with meals, Disp: 90 tablet, Rfl: 1  •  multivitamin (THERAGRAN) TABS, Take 1 tablet by mouth daily, Disp: , Rfl:   •  NADH POWD, Take 250 mg by mouth, Disp: , Rfl:   •  NON FORMULARY, 2 (two) times a day CBD OIL, Disp: , Rfl:   •  Saw Palmetto, Serenoa repens, 450 MG CAPS, Take by mouth, Disp: , Rfl:   •  simvastatin (ZOCOR) 10 mg tablet, Take 1 tablet (10 mg total) by mouth daily at bedtime, Disp: 90 tablet, Rfl: 3  •  timolol (TIMOPTIC) 0.5 % ophthalmic solution, INSITLL 1 DROP INTO BOTH EYES EVERY MORNING, Disp: , Rfl:       Physical Exam:   There were no vitals taken for this visit. GEN: no acute distress    RESP: breathing comfortably with no accessory muscle use    ABD: soft, non-tender, non-distended   INCISION:    EXT: no significant peripheral edema         RADIOLOGY:   none     Assessment:   1.   Prostate cancer    Plan:   -PSA every 3 months  -Follow-up in 6 months  -Intermittent ADT depending on PSA  -

## 2023-10-17 ENCOUNTER — ANESTHESIA EVENT (OUTPATIENT)
Dept: GASTROENTEROLOGY | Facility: AMBULARY SURGERY CENTER | Age: 73
End: 2023-10-17

## 2023-10-17 ENCOUNTER — HOSPITAL ENCOUNTER (OUTPATIENT)
Dept: GASTROENTEROLOGY | Facility: AMBULARY SURGERY CENTER | Age: 73
Setting detail: OUTPATIENT SURGERY
Discharge: HOME | End: 2023-10-17
Attending: INTERNAL MEDICINE
Payer: MEDICARE

## 2023-10-17 ENCOUNTER — ANESTHESIA (OUTPATIENT)
Dept: GASTROENTEROLOGY | Facility: AMBULARY SURGERY CENTER | Age: 73
End: 2023-10-17

## 2023-10-17 VITALS
TEMPERATURE: 96.2 F | HEART RATE: 67 BPM | OXYGEN SATURATION: 99 % | HEIGHT: 70 IN | BODY MASS INDEX: 27.2 KG/M2 | SYSTOLIC BLOOD PRESSURE: 146 MMHG | DIASTOLIC BLOOD PRESSURE: 74 MMHG | RESPIRATION RATE: 16 BRPM | WEIGHT: 190 LBS

## 2023-10-17 DIAGNOSIS — Z12.11 SCREENING FOR COLON CANCER: ICD-10-CM

## 2023-10-17 LAB — GLUCOSE SERPL-MCNC: 107 MG/DL (ref 65–140)

## 2023-10-17 PROCEDURE — 82948 REAGENT STRIP/BLOOD GLUCOSE: CPT

## 2023-10-17 PROCEDURE — 88305 TISSUE EXAM BY PATHOLOGIST: CPT | Performed by: PATHOLOGY

## 2023-10-17 RX ORDER — SODIUM CHLORIDE, SODIUM LACTATE, POTASSIUM CHLORIDE, CALCIUM CHLORIDE 600; 310; 30; 20 MG/100ML; MG/100ML; MG/100ML; MG/100ML
INJECTION, SOLUTION INTRAVENOUS CONTINUOUS PRN
Status: DISCONTINUED | OUTPATIENT
Start: 2023-10-17 | End: 2023-10-17

## 2023-10-17 RX ORDER — PROPOFOL 10 MG/ML
INJECTION, EMULSION INTRAVENOUS AS NEEDED
Status: DISCONTINUED | OUTPATIENT
Start: 2023-10-17 | End: 2023-10-17

## 2023-10-17 RX ADMIN — SODIUM CHLORIDE, SODIUM LACTATE, POTASSIUM CHLORIDE, AND CALCIUM CHLORIDE: .6; .31; .03; .02 INJECTION, SOLUTION INTRAVENOUS at 09:11

## 2023-10-17 RX ADMIN — PROPOFOL 50 MG: 10 INJECTION, EMULSION INTRAVENOUS at 09:26

## 2023-10-17 RX ADMIN — PROPOFOL 50 MG: 10 INJECTION, EMULSION INTRAVENOUS at 09:20

## 2023-10-17 RX ADMIN — PROPOFOL 50 MG: 10 INJECTION, EMULSION INTRAVENOUS at 09:30

## 2023-10-17 RX ADMIN — PROPOFOL 100 MG: 10 INJECTION, EMULSION INTRAVENOUS at 09:14

## 2023-10-17 NOTE — ANESTHESIA PREPROCEDURE EVALUATION
Procedure:  COLONOSCOPY    Relevant Problems   ANESTHESIA (within normal limits)      CARDIO   (+) Essential hypertension   (+) Mixed hyperlipidemia      ENDO   (+) Type 2 diabetes mellitus without complication (HCC)      /RENAL   (+) Prostate cancer (HCC)        Physical Exam    Airway    Mallampati score: III  TM Distance: >3 FB  Neck ROM: full     Dental        Cardiovascular      Pulmonary      Other Findings        Anesthesia Plan  ASA Score- 2     Anesthesia Type- IV sedation with anesthesia with ASA Monitors. Additional Monitors:     Airway Plan:            Plan Factors-Exercise tolerance (METS): >4 METS. Chart reviewed. Existing labs reviewed. Patient summary reviewed. Patient is not a current smoker. Induction- intravenous. Postoperative Plan-     Informed Consent- Anesthetic plan and risks discussed with patient. I personally reviewed this patient with the CRNA. Discussed and agreed on the Anesthesia Plan with the CRNA. Nan Morse

## 2023-10-17 NOTE — ANESTHESIA POSTPROCEDURE EVALUATION
Post-Op Assessment Note    CV Status:  Stable  Pain Score: 0    Pain management: adequate     Mental Status:  Alert and awake   Hydration Status:  Euvolemic   PONV Controlled:  Controlled   Airway Patency:  Patent      Post Op Vitals Reviewed: Yes      Staff: CRNA         There were no known notable events for this encounter.     /67 (10/17/23 0937)    Temp (!) 96.2 °F (35.7 °C) (10/17/23 0937)    Pulse 62 (10/17/23 0937)   Resp 16 (10/17/23 0937)    SpO2 99 % (10/17/23 0937)

## 2023-10-17 NOTE — H&P
History and Physical - [facility]  Jigar Welsh 68 y.o. male MRN: [id number]    HPI: Jigar Welsh is a 68y.o. year old male who presents for evaluation of colon cancer screening. Review of Systems    Historical Information   Past Medical History:   Diagnosis Date    Cancer [facility])     Colon polyp     Diabetes mellitus ([address] )     Hx of radiation therapy     Hypertension     Malignant neoplasm of prostate ([address] [facility])     Shingles 2018     Past Surgical History:   Procedure Laterality Date    COLONOSCOPY      9 years ago    MS COLONOSCOPY FLX DX W/COLLJ SPEC WHEN PFRMD N/A 2018    Procedure: COLONOSCOPY;  Surgeon: Maryann Kim MD;  Location: [facility]; Service: Gastroenterology     Social History   Social History     Substance and Sexual Activity   Alcohol Use Yes    Alcohol/week: 2.0 standard drinks of alcohol    Types: 2 Cans of beer per week    Comment: socially     Social History     Substance and Sexual Activity   Drug Use No     Social History     Tobacco Use   Smoking Status Former    Types: Cigarettes    Quit date:     Years since quittin.8   Smokeless Tobacco Never     Family History   Problem Relation Age of Onset    Diabetes Mother     Hypertension Mother     Heart disease Father         cardiac disorder    Diabetes Father     Hypertension Father     Prostate cancer Father     Diabetes Sister     Hypertension Sister     Multiple myeloma Brother     Bone cancer Maternal Aunt        Meds/Allergies     (Not in a hospital admission)      Allergies   Allergen Reactions    Lactate Diarrhea       Objective     /75   Pulse 62   Temp (!) 96.2 °F (35.7 °C) (Temporal)   Resp 16   Ht 5' 10" (1.778 m)   Wt 86.2 kg (190 lb)   SpO2 100%   BMI 27.26 kg/m²       PHYSICAL EXAM    Gen: NAD  CV: RRR  CHEST: Clear  ABD: soft, NT/ND  EXT: no edema  Neuro: AAO      ASSESSMENT/PLAN:  This is a 68y.o. year old male here for colon cancer screening.   Patient was explained about the risks and benefits of the procedure. Risks including but not limited to bleeding, infection, perforation were explained in detail. PLAN:   Procedure: Colonoscopy.

## 2023-10-20 PROCEDURE — 88305 TISSUE EXAM BY PATHOLOGIST: CPT | Performed by: PATHOLOGY

## 2024-01-23 ENCOUNTER — APPOINTMENT (OUTPATIENT)
Dept: LAB | Facility: AMBULARY SURGERY CENTER | Age: 74
End: 2024-01-23
Payer: MEDICARE

## 2024-01-23 DIAGNOSIS — E11.9 TYPE 2 DIABETES MELLITUS WITHOUT COMPLICATION, WITHOUT LONG-TERM CURRENT USE OF INSULIN (HCC): ICD-10-CM

## 2024-01-23 DIAGNOSIS — C61 PROSTATE CANCER (HCC): ICD-10-CM

## 2024-01-23 DIAGNOSIS — E78.2 MIXED HYPERLIPIDEMIA: ICD-10-CM

## 2024-01-23 LAB
ALBUMIN SERPL BCP-MCNC: 4.6 G/DL (ref 3.5–5)
ALP SERPL-CCNC: 117 U/L (ref 34–104)
ALT SERPL W P-5'-P-CCNC: 13 U/L (ref 7–52)
ANION GAP SERPL CALCULATED.3IONS-SCNC: 6 MMOL/L
AST SERPL W P-5'-P-CCNC: 19 U/L (ref 5–45)
BILIRUB SERPL-MCNC: 0.61 MG/DL (ref 0.2–1)
BUN SERPL-MCNC: 18 MG/DL (ref 5–25)
CALCIUM SERPL-MCNC: 9.9 MG/DL (ref 8.4–10.2)
CHLORIDE SERPL-SCNC: 102 MMOL/L (ref 96–108)
CHOLEST SERPL-MCNC: 201 MG/DL
CO2 SERPL-SCNC: 33 MMOL/L (ref 21–32)
CREAT SERPL-MCNC: 0.79 MG/DL (ref 0.6–1.3)
CREAT UR-MCNC: 127.1 MG/DL
ERYTHROCYTE [DISTWIDTH] IN BLOOD BY AUTOMATED COUNT: 15.2 % (ref 11.6–15.1)
EST. AVERAGE GLUCOSE BLD GHB EST-MCNC: 148 MG/DL
GLUCOSE P FAST SERPL-MCNC: 111 MG/DL (ref 65–99)
HBA1C MFR BLD: 6.8 %
HCT VFR BLD AUTO: 44.6 % (ref 36.5–46.1)
HDLC SERPL-MCNC: 84 MG/DL
HGB BLD-MCNC: 13.9 G/DL (ref 12–15.4)
LDLC SERPL CALC-MCNC: 102 MG/DL (ref 0–100)
MCH RBC QN AUTO: 25 PG (ref 26.8–34.3)
MCHC RBC AUTO-ENTMCNC: 31.2 G/DL (ref 31.4–37.4)
MCV RBC AUTO: 80 FL (ref 82–98)
MICROALBUMIN UR-MCNC: 9.7 MG/L
MICROALBUMIN/CREAT 24H UR: 8 MG/G CREATININE (ref 0–30)
PLATELET # BLD AUTO: 212 THOUSANDS/UL (ref 149–390)
PMV BLD AUTO: 11.2 FL (ref 8.9–12.7)
POTASSIUM SERPL-SCNC: 4.6 MMOL/L (ref 3.5–5.3)
PROT SERPL-MCNC: 7.7 G/DL (ref 6.4–8.4)
PSA SERPL-MCNC: 1.63 NG/ML (ref 0–4)
RBC # BLD AUTO: 5.56 MILLION/UL (ref 3.88–5.12)
SODIUM SERPL-SCNC: 141 MMOL/L (ref 135–147)
TRIGL SERPL-MCNC: 73 MG/DL
WBC # BLD AUTO: 4.54 THOUSAND/UL (ref 4.31–10.16)

## 2024-01-23 PROCEDURE — 83036 HEMOGLOBIN GLYCOSYLATED A1C: CPT

## 2024-01-23 PROCEDURE — 84153 ASSAY OF PSA TOTAL: CPT

## 2024-01-23 PROCEDURE — 80061 LIPID PANEL: CPT

## 2024-01-23 PROCEDURE — 82570 ASSAY OF URINE CREATININE: CPT

## 2024-01-23 PROCEDURE — 82043 UR ALBUMIN QUANTITATIVE: CPT

## 2024-01-23 PROCEDURE — 85027 COMPLETE CBC AUTOMATED: CPT

## 2024-01-23 PROCEDURE — 80053 COMPREHEN METABOLIC PANEL: CPT

## 2024-01-23 PROCEDURE — 36415 COLL VENOUS BLD VENIPUNCTURE: CPT

## 2024-01-25 ENCOUNTER — OFFICE VISIT (OUTPATIENT)
Dept: FAMILY MEDICINE CLINIC | Facility: CLINIC | Age: 74
End: 2024-01-25
Payer: MEDICARE

## 2024-01-25 VITALS
RESPIRATION RATE: 16 BRPM | BODY MASS INDEX: 28.63 KG/M2 | HEIGHT: 70 IN | SYSTOLIC BLOOD PRESSURE: 110 MMHG | HEART RATE: 60 BPM | WEIGHT: 200 LBS | DIASTOLIC BLOOD PRESSURE: 70 MMHG

## 2024-01-25 DIAGNOSIS — E11.9 TYPE 2 DIABETES MELLITUS WITHOUT COMPLICATION, WITHOUT LONG-TERM CURRENT USE OF INSULIN (HCC): Primary | ICD-10-CM

## 2024-01-25 DIAGNOSIS — C61 PROSTATE CANCER (HCC): ICD-10-CM

## 2024-01-25 DIAGNOSIS — E78.2 MIXED HYPERLIPIDEMIA: ICD-10-CM

## 2024-01-25 DIAGNOSIS — I10 ESSENTIAL HYPERTENSION: ICD-10-CM

## 2024-01-25 PROCEDURE — 99214 OFFICE O/P EST MOD 30 MIN: CPT | Performed by: FAMILY MEDICINE

## 2024-01-25 NOTE — ASSESSMENT & PLAN NOTE
Lab Results   Component Value Date    HGBA1C 6.8 (H) 01/23/2024   The trend is up ,cont same med , and start low sugar

## 2024-01-25 NOTE — ASSESSMENT & PLAN NOTE
Lab Results   Component Value Date    LDLCALC 102 (H) 01/23/2024   Days upward, discussed with him to improve his diet with low-fat and continue simvastatin

## 2024-01-25 NOTE — PATIENT INSTRUCTIONS

## 2024-01-25 NOTE — PROGRESS NOTES
Name: Samuel Knight      : 1950      MRN: [id number]  Encounter Provider: Sharon Pavon MD  Encounter Date: 2024   Encounter department: [facility]    Assessment & Plan     1. Type 2 diabetes mellitus without complication, without long-term current use of insulin (HCC)  Assessment & Plan:    Lab Results   Component Value Date    HGBA1C 6.8 (H) 2024   The trend is up ,cont same med , and start low sugar     Orders:  -     Albumin / creatinine urine ratio; Future; Expected date: 2024  -     Comprehensive metabolic panel; Future; Expected date: 2024  -     Hemoglobin A1C; Future; Expected date: 2024  -     Lipid Panel with Direct LDL reflex; Future; Expected date: 2024    2. Essential hypertension  Assessment & Plan:  Hypertension is stable, continue low-salt diet, continue same medications.      Orders:  -     Albumin / creatinine urine ratio; Future; Expected date: 2024  -     Comprehensive metabolic panel; Future; Expected date: 2024  -     Lipid Panel with Direct LDL reflex; Future; Expected date: 2024    3. Prostate cancer (HCC)  Assessment & Plan:  Neurologist and getting Lupron injections every 6 months      4. Mixed hyperlipidemia  Assessment & Plan:  Lab Results   Component Value Date    LDLCALC 102 (H) 2024   Days upward, discussed with him to improve his diet with low-fat and continue simvastatin      Orders:  -     Lipid Panel with Direct LDL reflex; Future; Expected date: 2024    Discussed with him all the vaccination, influenza pneumonia and shingles and he refused       Subjective     Follow-up, he follows urologist for prostate cancer and gets treatment from them, 1 injection every 6 months and doing very well, he says his diet was somewhat more carbs and sugar lately routine activities, overall feel healthy all his medications and blood pressure remains stable      Review of Systems   Constitutional:   Negative for activity change, appetite change, chills, fatigue, fever and unexpected weight change.   HENT:  Negative for congestion, ear discharge, ear pain, nosebleeds, postnasal drip, rhinorrhea, sinus pressure, sneezing, sore throat, trouble swallowing and voice change.    Eyes:  Negative for photophobia, pain, discharge, redness and itching.   Respiratory:  Negative for cough, chest tightness, shortness of breath and wheezing.    Cardiovascular:  Negative for chest pain, palpitations and leg swelling.   Gastrointestinal:  Negative for abdominal pain, constipation, diarrhea, nausea and vomiting.   Endocrine: Negative for polyuria.   Genitourinary:  Negative for dysuria, frequency and urgency.   Musculoskeletal:  Negative for arthralgias, back pain, myalgias and neck pain.   Skin:  Negative for color change, pallor and rash.   Allergic/Immunologic: Negative for environmental allergies and food allergies.   Neurological:  Negative for dizziness, weakness, light-headedness and headaches.   Hematological:  Negative for adenopathy. Does not bruise/bleed easily.   Psychiatric/Behavioral:  Negative for behavioral problems. The patient is not nervous/anxious.        Past Medical History:   Diagnosis Date   • Cancer (HCC)    • Colon polyp    • Diabetes mellitus (HCC)    • Hx of radiation therapy    • Hypertension    • Malignant neoplasm of prostate (HCC)    • Shingles 02/22/2018     Past Surgical History:   Procedure Laterality Date   • COLONOSCOPY      9 years ago   • IN COLONOSCOPY FLX DX W/COLLJ SPEC WHEN PFRMD N/A 6/25/2018    Procedure: COLONOSCOPY;  Surgeon: Edwardo Cannon MD;  Location: [facility];  Service: Gastroenterology     Family History   Problem Relation Age of Onset   • Diabetes Mother    • Hypertension Mother    • Heart disease Father         cardiac disorder   • Diabetes Father    • Hypertension Father    • Prostate cancer Father    • Diabetes Sister    • Hypertension Sister    • Multiple myeloma  Brother    • Bone cancer Maternal Aunt      Social History     Socioeconomic History   • Marital status: Single     Spouse name: None   • Number of children: None   • Years of education: None   • Highest education level: None   Occupational History   • Occupation: retired     Comment:    Tobacco Use   • Smoking status: Former     Current packs/day: 0.00     Types: Cigarettes     Quit date:      Years since quittin.1   • Smokeless tobacco: Never   Vaping Use   • Vaping status: Never Used   Substance and Sexual Activity   • Alcohol use: Yes     Alcohol/week: 2.0 standard drinks of alcohol     Types: 2 Cans of beer per week     Comment: socially   • Drug use: No   • Sexual activity: None   Other Topics Concern   • None   Social History Narrative    Two children     Social Determinants of Health     Financial Resource Strain: Not on file   Food Insecurity: Not on file   Transportation Needs: Not on file   Physical Activity: Not on file   Stress: Not on file   Social Connections: Not on file   Intimate Partner Violence: Not on file   Housing Stability: Not on file     Current Outpatient Medications on File Prior to Visit   Medication Sig   • amLODIPine (NORVASC) 10 mg tablet Take 1 tablet (10 mg total) by mouth daily   • Barberry-Oreg Grape-Goldenseal (BERBERINE COMPLEX PO) Take by mouth   • calcium carbonate-vitamin D (OSCAL-D) 500 mg-200 units per tablet Take 1 tablet by mouth daily   • Coenzyme Q10 (COQ10) 100 MG CAPS Take by mouth   • Continuous Blood Gluc Sensor (FreeStyle Zev 2 Sensor) MISC Check blood sugars multiple times per day   • Cyanocobalamin (B-12 PO) Take by mouth   • latanoprost (XALATAN) 0.005 % ophthalmic solution INSTILL 1 DROP INTO BOTH EYES AT BEDTIME. FILL ON 18   • MEGARED OMEGA-3 KRILL  MG CAPS Take by mouth   • metFORMIN (GLUCOPHAGE) 850 mg tablet Take 1 tablet (850 mg total) by mouth 2 (two) times a day with meals   • multivitamin (THERAGRAN) TABS Take  "1 tablet by mouth daily   • NADH POWD Take 250 mg by mouth   • NON FORMULARY 2 (two) times a day CBD OIL   • Saw Palmetto, Serenoa repens, 450 MG CAPS Take by mouth   • simvastatin (ZOCOR) 10 mg tablet Take 1 tablet (10 mg total) by mouth daily at bedtime   • timolol (TIMOPTIC) 0.5 % ophthalmic solution INSITLL 1 DROP INTO BOTH EYES EVERY MORNING     Allergies   Allergen Reactions   • Lactate Diarrhea     Immunization History   Administered Date(s) Administered   • COVID-19 PFIZER VACCINE 0.3 ML IM 03/19/2021, 04/12/2021, 10/20/2021   • COVID-19 Pfizer Vac BIVALENT Sanya-sucrose 12 Yr+ IM 11/10/2022       Objective     /70   Pulse 60   Resp 16   Ht 5' 10\" (1.778 m)   Wt 90.7 kg (200 lb)   BMI 28.70 kg/m²     Physical Exam  Vitals and nursing note reviewed.   Constitutional:       Appearance: Normal appearance. Samuel is well-developed. Samuel is not ill-appearing.   HENT:      Head: Normocephalic and atraumatic.      Nose: Nose normal.      Mouth/Throat:      Pharynx: No oropharyngeal exudate.   Eyes:      General: No scleral icterus.        Right eye: No discharge.         Left eye: No discharge.      Extraocular Movements: Extraocular movements intact.      Conjunctiva/sclera: Conjunctivae normal.   Neck:      Thyroid: No thyromegaly.      Trachea: No tracheal deviation.   Cardiovascular:      Rate and Rhythm: Normal rate and regular rhythm.      Heart sounds: Normal heart sounds. No murmur heard.  Pulmonary:      Effort: Pulmonary effort is normal. No respiratory distress.      Breath sounds: Normal breath sounds. No wheezing or rales.   Abdominal:      General: Bowel sounds are normal. There is no distension.      Palpations: Abdomen is soft. There is no mass.      Tenderness: There is no abdominal tenderness. There is no rebound.   Musculoskeletal:         General: Normal range of motion.      Cervical back: Normal range of motion and neck supple.      Right lower leg: No edema.      Left lower leg: " No edema.   Lymphadenopathy:      Cervical: No cervical adenopathy.   Skin:     General: Skin is warm.      Coloration: Skin is not pale.      Findings: No erythema or rash.   Neurological:      Mental Status: Samuel is alert and oriented to person, place, and time.      Cranial Nerves: No cranial nerve deficit.      Deep Tendon Reflexes: Reflexes are normal and symmetric.   Psychiatric:         Behavior: Behavior normal.         Thought Content: Thought content normal.         Judgment: Judgment normal.       Sharon Pavon MD

## 2024-02-12 DIAGNOSIS — E11.9 TYPE 2 DIABETES MELLITUS WITHOUT COMPLICATION, WITHOUT LONG-TERM CURRENT USE OF INSULIN (HCC): ICD-10-CM

## 2024-02-21 PROBLEM — Z12.11 COLON CANCER SCREENING: Status: RESOLVED | Noted: 2018-03-13 | Resolved: 2024-02-21

## 2024-03-07 ENCOUNTER — TELEPHONE (OUTPATIENT)
Dept: FAMILY MEDICINE CLINIC | Facility: CLINIC | Age: 74
End: 2024-03-07

## 2024-03-07 DIAGNOSIS — I10 ESSENTIAL HYPERTENSION: ICD-10-CM

## 2024-03-07 RX ORDER — AMLODIPINE BESYLATE 10 MG/1
10 TABLET ORAL DAILY
Qty: 90 TABLET | Refills: 3 | Status: SHIPPED | OUTPATIENT
Start: 2024-03-07

## 2024-03-07 NOTE — TELEPHONE ENCOUNTER
Pt came in requesting a note for him to start personal training. He said due to his high blood pressure they will need a note from his doctor approving this.

## 2024-04-05 ENCOUNTER — APPOINTMENT (OUTPATIENT)
Dept: LAB | Facility: AMBULARY SURGERY CENTER | Age: 74
End: 2024-04-05
Payer: MEDICARE

## 2024-04-05 DIAGNOSIS — C61 MALIGNANT NEOPLASM OF PROSTATE (HCC): ICD-10-CM

## 2024-04-05 LAB — PSA SERPL-MCNC: 5.61 NG/ML (ref 0–4)

## 2024-04-05 PROCEDURE — 84153 ASSAY OF PSA TOTAL: CPT

## 2024-04-05 PROCEDURE — 36415 COLL VENOUS BLD VENIPUNCTURE: CPT

## 2024-04-11 ENCOUNTER — OFFICE VISIT (OUTPATIENT)
Dept: UROLOGY | Facility: CLINIC | Age: 74
End: 2024-04-11
Payer: MEDICARE

## 2024-04-11 VITALS
HEIGHT: 70 IN | OXYGEN SATURATION: 96 % | SYSTOLIC BLOOD PRESSURE: 138 MMHG | BODY MASS INDEX: 29.29 KG/M2 | HEART RATE: 67 BPM | WEIGHT: 204.6 LBS | DIASTOLIC BLOOD PRESSURE: 78 MMHG | RESPIRATION RATE: 14 BRPM

## 2024-04-11 DIAGNOSIS — C61 PROSTATE CANCER (HCC): Primary | ICD-10-CM

## 2024-04-11 PROCEDURE — 96402 CHEMO HORMON ANTINEOPL SQ/IM: CPT

## 2024-04-11 PROCEDURE — 99213 OFFICE O/P EST LOW 20 MIN: CPT | Performed by: PHYSICIAN ASSISTANT

## 2024-04-11 NOTE — PROGRESS NOTES
UROLOGY PROGRESS NOTE   Patient Identifiers: Samuel Knight (MRN [id number])  Date of Service: 4/11/2024    Subjective:   74-year-old man history of metastatic prostate cancer.  He was treated in [place] in 2004 with radiation and ADT.  PSA was 3.4.  His last PSA was 0.74.  He was given Lupron in April.  PSA in January was 1.63 and unfortunately current PSA is up to 5.61.  Denies any weight loss.  Does have some right low back and hip pain.  Known metastatic disease in that area.  Firmagon 240 mg will be given today    Reason for visit: Prostate cancer follow-up    Objective:     VITALS:    There were no vitals filed for this visit.        LABS:  Lab Results   Component Value Date    HGB 13.9 01/23/2024    HCT 44.6 01/23/2024    WBC 4.54 01/23/2024     01/23/2024   ]    Lab Results   Component Value Date    K 4.6 01/23/2024     01/23/2024    CO2 33 (H) 01/23/2024    BUN 18 01/23/2024    CREATININE 0.79 01/23/2024    CALCIUM 9.9 01/23/2024   ]        INPATIENT MEDS:    Current Outpatient Medications:     amLODIPine (NORVASC) 10 mg tablet, Take 1 tablet (10 mg total) by mouth daily, Disp: 90 tablet, Rfl: 3    Barberry-Oreg Grape-Goldenseal (BERBERINE COMPLEX PO), Take by mouth, Disp: , Rfl:     calcium carbonate-vitamin D (OSCAL-D) 500 mg-200 units per tablet, Take 1 tablet by mouth daily, Disp: , Rfl:     Coenzyme Q10 (COQ10) 100 MG CAPS, Take by mouth, Disp: , Rfl:     Continuous Blood Gluc Sensor (FreeStyle Zev 2 Sensor) MISC, Check blood sugars multiple times per day, Disp: 6 each, Rfl: 3    Cyanocobalamin (B-12 PO), Take by mouth, Disp: , Rfl:     latanoprost (XALATAN) 0.005 % ophthalmic solution, INSTILL 1 DROP INTO BOTH EYES AT BEDTIME. FILL ON 01-09-18, Disp: , Rfl: 0    MEGARED OMEGA-3 KRILL  MG CAPS, Take by mouth, Disp: , Rfl:     metFORMIN (GLUCOPHAGE) 850 mg tablet, TAKE 1 TABLET BY MOUTH DAILY WITH BREAKFAST., Disp: 90 tablet, Rfl: 1    multivitamin (THERAGRAN) TABS, Take 1  tablet by mouth daily, Disp: , Rfl:     NADH POWD, Take 250 mg by mouth, Disp: , Rfl:     NON FORMULARY, 2 (two) times a day CBD OIL, Disp: , Rfl:     Saw Palmetto, Serenoa repens, 450 MG CAPS, Take by mouth, Disp: , Rfl:     simvastatin (ZOCOR) 10 mg tablet, Take 1 tablet (10 mg total) by mouth daily at bedtime, Disp: 90 tablet, Rfl: 3    timolol (TIMOPTIC) 0.5 % ophthalmic solution, INSITLL 1 DROP INTO BOTH EYES EVERY MORNING, Disp: , Rfl:       Physical Exam:   There were no vitals taken for this visit.  GEN: no acute distress    RESP: breathing comfortably with no accessory muscle use    ABD: soft, non-tender, non-distended   INCISION:    EXT: no significant peripheral edema       RADIOLOGY:   none     Assessment:   #1.  Metastatic prostate cancer    Plan:   -Return in 4 weeks for Lupron with the nurse  -CT pelvis  -With her referral to medical oncology  -Will consider radiation oncology referral for palliative radiation

## 2024-04-12 ENCOUNTER — TELEPHONE (OUTPATIENT)
Dept: HEMATOLOGY ONCOLOGY | Facility: CLINIC | Age: 74
End: 2024-04-12

## 2024-04-12 NOTE — TELEPHONE ENCOUNTER
I called Samuel in response to a referral that was received for patient to establish care with Medical Oncology.     Outreach was made to schedule a consultation.    I left a voicemail explaining the reason for my call and advised patient to call [facility] at [phone].  Another attempt will be made to contact patient.

## 2024-04-15 ENCOUNTER — TELEPHONE (OUTPATIENT)
Age: 74
End: 2024-04-15

## 2024-04-15 ENCOUNTER — PATIENT OUTREACH (OUTPATIENT)
Dept: HEMATOLOGY ONCOLOGY | Facility: CLINIC | Age: 74
End: 2024-04-15

## 2024-04-15 ENCOUNTER — DOCUMENTATION (OUTPATIENT)
Dept: HEMATOLOGY ONCOLOGY | Facility: CLINIC | Age: 74
End: 2024-04-15

## 2024-04-15 DIAGNOSIS — C61 PROSTATE CANCER (HCC): Primary | ICD-10-CM

## 2024-04-15 NOTE — PROGRESS NOTES
Per Ananda capone to schedule PSMA PET in place of CT scan.     PSMA PET scan scheduled at [facility] 5/8/24 1100  CT scan cancelled.     Outreach to pt and left voicemail introducing myself and role as Nurse Navigator to assist with coordination of cancer care, preparation for upcoming appointment, be a point of contact prior to oncology consult,  provide support and connect with available resources.  Provided contact information, reviewed upcoming appts and requested call back.    Future Appointments   Date Time Provider Department Center   4/18/2024  3:00 PM AN CT 2 AN CT [facility]   5/1/2024  9:00 AM MD RAHUL Salamanca ONC [facility]   5/8/2024 11:00 AM BE [facility] PET RM 4 BE [facility]   5/9/2024 11:00 AM UROLOGY NURSE TERESA DUBON [facility]   6/18/2024 10:20 AM Sharon Pavon MD [facility]

## 2024-04-15 NOTE — PROGRESS NOTES
Urology Oncology Nurse Navigator  Intake received/ Chart reviewed for work up completed     History in 2004 of Prostate cancer with mets treated with Radiation and ADT in [place].     Imaging completed:      PSMA PET - 11/2/22. ordered updated imaging    PSA:  Component  Ref Range & Units 4/5/24  8:54 AM 1/23/24  8:17 AM 10/5/23  8:03 AM 4/4/23  7:44 AM 11/18/22  8:23 AM 8/25/22 10:40 AM 5/16/22  7:07 AM   PSA, Diagnostic  0.00 - 4.00 ng/mL 5.61 High  1.63 CM 0.74 CM 3.4 R, CM 0.8 R, CM 1.1 R, CM 6.0 High        Urologist:   Ananda Vicente PA-C       Pathology completed: [place] 2004

## 2024-04-15 NOTE — TELEPHONE ENCOUNTER
Patient called after having been referred to Hem Onc by AMANDA Vicente during last OV.    Pt stated that the office told him the referral hadn't been received.    I located a message from [facility] On[facility] in patient's chart and informed the patient that they did receive the referral, and had attempted to call him to schedule. I provided the office's tel number as noted in the telephone encounter [phone].     Pt verbalized understanding and will call to schedule his first appointment.    No further action is needed at this time.

## 2024-04-18 ENCOUNTER — HOSPITAL ENCOUNTER (OUTPATIENT)
Dept: CT IMAGING | Facility: HOSPITAL | Age: 74
Discharge: HOME | End: 2024-04-18
Payer: MEDICARE

## 2024-04-18 ENCOUNTER — APPOINTMENT (OUTPATIENT)
Dept: RADIOLOGY | Facility: HOSPITAL | Age: 74
End: 2024-04-18
Payer: MEDICARE

## 2024-04-18 DIAGNOSIS — C61 PROSTATE CANCER (HCC): ICD-10-CM

## 2024-04-18 PROCEDURE — 72192 CT PELVIS W/O DYE: CPT

## 2024-05-01 ENCOUNTER — OFFICE VISIT (OUTPATIENT)
Dept: HEMATOLOGY ONCOLOGY | Facility: CLINIC | Age: 74
End: 2024-05-01
Payer: MEDICARE

## 2024-05-01 VITALS
OXYGEN SATURATION: 98 % | TEMPERATURE: 97.6 F | DIASTOLIC BLOOD PRESSURE: 74 MMHG | RESPIRATION RATE: 17 BRPM | HEIGHT: 70 IN | SYSTOLIC BLOOD PRESSURE: 106 MMHG | BODY MASS INDEX: 28.99 KG/M2 | HEART RATE: 65 BPM | WEIGHT: 202.5 LBS

## 2024-05-01 DIAGNOSIS — C61 PROSTATE CANCER (HCC): ICD-10-CM

## 2024-05-01 DIAGNOSIS — C61 NEOPLASM OF PROSTATE, DISTANT METASTASIS STAGING CATEGORY M1B: METASTASIS TO BONE (HCC): Primary | ICD-10-CM

## 2024-05-01 DIAGNOSIS — C79.51 NEOPLASM OF PROSTATE, DISTANT METASTASIS STAGING CATEGORY M1B: METASTASIS TO BONE (HCC): Primary | ICD-10-CM

## 2024-05-01 PROCEDURE — 99204 OFFICE O/P NEW MOD 45 MIN: CPT | Performed by: INTERNAL MEDICINE

## 2024-05-01 NOTE — PROGRESS NOTES
Oncology Consult Note  aSmuel Knight 74 y.o. adult MRN: [id number]  Unit/Bed#:  Encounter: [id number]      Presenting Complaint: Prostate cancer    History of Presenting Illness: Samuel Knight is seen for initial consultation 5/1/2024 at the referral of Yuri Vicente*   74-year-old -American male with history of glaucoma, hypertension, diabetes mellitus type 2 who was diagnosed with prostate cancer in 2004 in [facility], according to the patient was [place] score of 5 status post radiation therapy and he received Lupron intermittently, I do not know the PSA at that time    He had been followed by urology group according to the patient he had metastatic disease in 2016 after rising PSA he was managed on androgen deprivation therapy with Lupron and elected to discontinue it due to the sexual side effect  PSMA scan on 11/2022 showed focal area of increased activity within the left posterior peripheral prostate zone with SUV of 4 concerning for residual or recurrent disease, no evidence of pelvic adenopathy, slight asymmetry in the pelvic bone more on the right than the left also was found on the CAT scan in 2017 monitor present stable metastatic in the right hemipelvis 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    Past Medical History:   Diagnosis Date    Cancer (HCC)     Colon polyp     Diabetes mellitus (HCC)     Hx of radiation therapy     Hypertension     Malignant neoplasm of prostate (HCC)     Shingles 02/22/2018       Social History     Socioeconomic History    Marital status: Single     Spouse name: Not on file    Number of children: Not on file     Years of education: Not on file    Highest education level: Not on file   Occupational History    Occupation: retired     Comment:    Tobacco Use    Smoking status: Former     Current packs/day: 0.00     Types: Cigarettes     Quit date:      Years since quittin.3    Smokeless tobacco: Never   Vaping Use    Vaping status: Never Used   Substance and Sexual Activity    Alcohol use: Not Currently     Alcohol/week: 2.0 standard drinks of alcohol     Types: 2 Cans of beer per week     Comment: socially    Drug use: No    Sexual activity: Not on file   Other Topics Concern    Not on file   Social History Narrative    Two children     Social Determinants of Health     Financial Resource Strain: Not on file   Food Insecurity: Not on file   Transportation Needs: Not on file   Physical Activity: Not on file   Stress: Not on file   Social Connections: Not on file   Intimate Partner Violence: Not on file   Housing Stability: Not on file       Family History   Problem Relation Age of Onset    Diabetes Mother     Hypertension Mother     Heart disease Father         cardiac disorder    Diabetes Father     Hypertension Father     Prostate cancer Father     Diabetes Sister     Hypertension Sister     Multiple myeloma Brother     Bone cancer Maternal Aunt        Allergies   Allergen Reactions    Lactate Diarrhea         Current Outpatient Medications:     amLODIPine (NORVASC) 10 mg tablet, Take 1 tablet (10 mg total) by mouth daily, Disp: 90 tablet, Rfl: 3    Barberry-Oreg Grape-Goldenseal (BERBERINE COMPLEX PO), Take by mouth, Disp: , Rfl:     calcium carbonate-vitamin D (OSCAL-D) 500 mg-200 units per tablet, Take 1 tablet by mouth daily, Disp: , Rfl:     Coenzyme Q10 (COQ10) 100 MG CAPS, Take by mouth, Disp: , Rfl:     Continuous Blood Gluc Sensor (FreeStyle Zev 2 Sensor) MISC, Check blood sugars multiple times per day, Disp: 6 each, Rfl: 3    Cyanocobalamin (B-12 PO), Take by mouth, Disp: , Rfl:      "latanoprost (XALATAN) 0.005 % ophthalmic solution, INSTILL 1 DROP INTO BOTH EYES AT BEDTIME. FILL ON 01-09-18, Disp: , Rfl: 0    MEGARED OMEGA-3 KRILL  MG CAPS, Take by mouth, Disp: , Rfl:     metFORMIN (GLUCOPHAGE) 850 mg tablet, TAKE 1 TABLET BY MOUTH DAILY WITH BREAKFAST., Disp: 90 tablet, Rfl: 1    multivitamin (THERAGRAN) TABS, Take 1 tablet by mouth daily, Disp: , Rfl:     NADH POWD, Take 250 mg by mouth, Disp: , Rfl:     NON FORMULARY, 2 (two) times a day CBD OIL, Disp: , Rfl:     Saw Palmetto, Serenoa repens, 450 MG CAPS, Take by mouth, Disp: , Rfl:     simvastatin (ZOCOR) 10 mg tablet, Take 1 tablet (10 mg total) by mouth daily at bedtime, Disp: 90 tablet, Rfl: 3    timolol (TIMOPTIC) 0.5 % ophthalmic solution, INSITLL 1 DROP INTO BOTH EYES EVERY MORNING, Disp: , Rfl:       /74 (BP Location: Left arm, Patient Position: Sitting, Cuff Size: Adult)   Pulse 65   Temp 97.6 °F (36.4 °C) (Temporal)   Resp 17   Ht 5' 10\" (1.778 m)   Wt 91.9 kg (202 lb 8 oz)   SpO2 98%   BMI 29.06 kg/m²       General Appearance:    Alert, oriented        Eyes:    PERRL   Ears:     Nose:    Throat:   Mucosa moist. Pharynx without injection.    Neck:   Supple       Lungs:     Clear to auscultation bilaterally   Chest Wall:    No tenderness or deformity    Heart:    Regular rate and rhythm       Abdomen:     Soft, non-tender, bowel sounds +, no organomegaly           Extremities:   Extremities no cyanosis or edema       Skin:   no rash or icterus.    Lymph nodes:   Cervical, supraclavicular, and axillary nodes normal   Neurologic:   CNII-XII intact, normal strength, sensation and reflexes     Throughout               No results found for this or any previous visit (from the past 48 hour(s)).      CT pelvis wo contrast    Result Date: 4/26/2024  Narrative: CT PELVIS WITHOUT IV CONTRAST INDICATION: C61: Malignant neoplasm of prostate. COMPARISON: PET/CT 11/2/2022. TECHNIQUE: CT examination of the pelvis was performed " without intravenous contrast. Multiplanar 2D reformatted images were created from the source data. This examination, like all CT scans performed in the [facility], was performed utilizing techniques to minimize radiation dose exposure, including the use of iterative reconstruction and automated exposure control. Radiation dose length product (DLP) for this visit: 1065 mGy-cm . Enteric Contrast: Not administered. FINDINGS: VISUALIZED KIDNEYS/URETERS: No significant abnormality in the partially imaged kidneys and ureters. REPRODUCTIVE ORGANS: Unremarkable for patient's age. URINARY BLADDER: Unremarkable. VISUALIZED BOWEL: Colonic diverticulosis without findings of acute diverticulitis. APPENDIX: No findings to suggest appendicitis. ABDOMINOPELVIC CAVITY: No ascites. No pneumoperitoneum. No lymphadenopathy. VESSELS: Unremarkable for patient's age. ABDOMINOPELVIC WALL/INGUINAL REGIONS: Unremarkable. BONES: No acute fracture or suspicious osseous lesion.     Impression: No acute findings in the pelvis within the limits of unenhanced technique. Workstation performed: [id number]     ECOG :0      Assessment and plan:    74-year-old -American male who was diagnosed at age 54 with prostate cancer [place] score of 6 status post radiation therapy and intermittent Lupron Lupron was discontinued later on because of sexual side effects, the initial diagnosis was done in [facility]    I do not have any previous medical records on the patient however he had metastatic disease in 2016 by rising PSA he received Lupron    PSMA scan on 11/2022 showed possible right hemipelvis bony disease    PSA was 0.7 on 10/2003    However on 4/2024 went up to 5.6    1.  Will repeat PSMA scan for staging  2.  Will send for liquid biopsy to rule out any inherited deleterious mutation  3.  Depends on the PSMA scan we might repeat tissue biopsy to confirm adenocarcinoma of the prostate versus transformation into small  cell component  4.  Will follow-up in 1 month  5.  Hypertension he is on amlodipine 10 mg p.o. daily  6.  Diabetes mellitus, glaucoma followed by you

## 2024-05-03 ENCOUNTER — APPOINTMENT (OUTPATIENT)
Dept: LAB | Facility: AMBULARY SURGERY CENTER | Age: 74
End: 2024-05-03
Payer: MEDICARE

## 2024-05-03 DIAGNOSIS — C61 MALIGNANT NEOPLASM OF PROSTATE (HCC): ICD-10-CM

## 2024-05-03 DIAGNOSIS — C61 PROSTATE CANCER (HCC): ICD-10-CM

## 2024-05-03 DIAGNOSIS — C79.51 SECONDARY MALIGNANT NEOPLASM OF BONE AND BONE MARROW (HCC): ICD-10-CM

## 2024-05-03 DIAGNOSIS — C79.52 SECONDARY MALIGNANT NEOPLASM OF BONE AND BONE MARROW (HCC): ICD-10-CM

## 2024-05-03 LAB — PSA SERPL-MCNC: 2.66 NG/ML (ref 0–4)

## 2024-05-03 PROCEDURE — 36415 COLL VENOUS BLD VENIPUNCTURE: CPT

## 2024-05-03 PROCEDURE — 84153 ASSAY OF PSA TOTAL: CPT

## 2024-05-08 ENCOUNTER — HOSPITAL ENCOUNTER (OUTPATIENT)
Dept: RADIOLOGY | Age: 74
Discharge: HOME | End: 2024-05-08

## 2024-05-09 ENCOUNTER — PROCEDURE VISIT (OUTPATIENT)
Dept: UROLOGY | Facility: CLINIC | Age: 74
End: 2024-05-09
Payer: MEDICARE

## 2024-05-09 VITALS
DIASTOLIC BLOOD PRESSURE: 82 MMHG | HEART RATE: 64 BPM | OXYGEN SATURATION: 97 % | WEIGHT: 203 LBS | HEIGHT: 70 IN | BODY MASS INDEX: 29.06 KG/M2 | SYSTOLIC BLOOD PRESSURE: 150 MMHG

## 2024-05-09 DIAGNOSIS — C61 PROSTATE CANCER (HCC): Primary | ICD-10-CM

## 2024-05-09 PROCEDURE — 96402 CHEMO HORMON ANTINEOPL SQ/IM: CPT

## 2024-05-09 NOTE — PROGRESS NOTES
"5/9/2024  Samuel Knight is a 74 y.o. adult  [id number]    Diagnosis:  Chief Complaint    Prostate Cancer         Patient presents for lupron injection managed by our office    Plan:  Will get a PSA in 3 months and then will follow up with PA in 6 months with another PSA       Medication administration:    Medication prepared per manufacturers instructions. Site cleaned with alcohol swab on R upper outer quadrant of buttock. Medication administered per protocol. No bandaid needed.     Administrations This Visit       leuprolide (LUPRON DEPOT 6 MONTH KIT) IM injection kit 45 mg       Admin Date  05/09/2024 Action  Given Dose  45 mg Route  Intramuscular Documented By  Ella Rivas RN                    Vitals:    05/09/24 1058   BP: 150/82   Pulse: 64   SpO2: 97%   Weight: 92.1 kg (203 lb)   Height: 5' 10\" (1.778 m)         Ella Rivas RN   "

## 2024-05-13 ENCOUNTER — TELEPHONE (OUTPATIENT)
Dept: HEMATOLOGY ONCOLOGY | Facility: CLINIC | Age: 74
End: 2024-05-13

## 2024-05-13 NOTE — TELEPHONE ENCOUNTER
Appointment Change  Cancel, Reschedule, Change to Virtual      Who are you speaking with? Patient   If it is not the patient, is the caller listed on the communication consent form? Yes   Which provider is the appointment scheduled with? Dr. Carpenter   When was the original appointment scheduled?    Please list date and time 6/11   At which location is the appointment scheduled to take place? [facility]   Was the appointment rescheduled?     Was the appointment changed from an in person visit to a virtual visit?    If so, please list the details of the change. 6/20 1pm   What is the reason for the appointment change? lab       Was STAR transport scheduled? No   Does STAR transport need to be scheduled for the new visit (if applicable) No   Does the patient need an infusion appointment rescheduled? No   Does the patient have an upcoming infusion appointment scheduled? If so, when? No   Is the patient undergoing chemotherapy? Yes   For appointments cancelled with less than 24 hours:  Was the no-show policy reviewed? Yes

## 2024-06-10 ENCOUNTER — HOSPITAL ENCOUNTER (OUTPATIENT)
Dept: RADIOLOGY | Age: 74
Discharge: HOME | End: 2024-06-10
Payer: MEDICARE

## 2024-06-10 DIAGNOSIS — R97.21 RISING PSA FOLLOWING TREATMENT FOR MALIGNANT NEOPLASM OF PROSTATE: ICD-10-CM

## 2024-06-10 DIAGNOSIS — C61 PROSTATE CANCER (HCC): ICD-10-CM

## 2024-06-10 PROCEDURE — 78815 PET IMAGE W/CT SKULL-THIGH: CPT

## 2024-06-10 PROCEDURE — A9595 HB PIFLUFOLASTAT F-18, DIAGNOSTIC, 1 MILLICURIE: HCPCS

## 2024-06-14 ENCOUNTER — APPOINTMENT (OUTPATIENT)
Dept: LAB | Facility: AMBULARY SURGERY CENTER | Age: 74
End: 2024-06-14
Payer: MEDICARE

## 2024-06-14 DIAGNOSIS — E11.9 TYPE 2 DIABETES MELLITUS WITHOUT COMPLICATION, WITHOUT LONG-TERM CURRENT USE OF INSULIN (HCC): ICD-10-CM

## 2024-06-14 DIAGNOSIS — E78.2 MIXED HYPERLIPIDEMIA: ICD-10-CM

## 2024-06-14 DIAGNOSIS — I10 ESSENTIAL HYPERTENSION: ICD-10-CM

## 2024-06-14 LAB
ALBUMIN SERPL BCP-MCNC: 4.2 G/DL (ref 3.5–5)
ALP SERPL-CCNC: 95 U/L (ref 34–104)
ALT SERPL W P-5'-P-CCNC: 12 U/L (ref 7–52)
ANION GAP SERPL CALCULATED.3IONS-SCNC: 8 MMOL/L (ref 4–13)
AST SERPL W P-5'-P-CCNC: 19 U/L (ref 5–45)
BILIRUB SERPL-MCNC: 0.71 MG/DL (ref 0.2–1)
BUN SERPL-MCNC: 11 MG/DL (ref 5–25)
CALCIUM SERPL-MCNC: 9.1 MG/DL (ref 8.4–10.2)
CHLORIDE SERPL-SCNC: 103 MMOL/L (ref 96–108)
CHOLEST SERPL-MCNC: 162 MG/DL
CO2 SERPL-SCNC: 29 MMOL/L (ref 21–32)
CREAT SERPL-MCNC: 0.7 MG/DL (ref 0.6–1.3)
CREAT UR-MCNC: 25.5 MG/DL
EST. AVERAGE GLUCOSE BLD GHB EST-MCNC: 143 MG/DL
GLUCOSE P FAST SERPL-MCNC: 110 MG/DL (ref 65–99)
HBA1C MFR BLD: 6.6 %
HDLC SERPL-MCNC: 72 MG/DL
LDLC SERPL CALC-MCNC: 74 MG/DL (ref 0–100)
MICROALBUMIN UR-MCNC: <7 MG/L
MICROALBUMIN/CREAT 24H UR: <27 MG/G CREATININE (ref 0–30)
POTASSIUM SERPL-SCNC: 4.4 MMOL/L (ref 3.5–5.3)
PROT SERPL-MCNC: 6.9 G/DL (ref 6.4–8.4)
SODIUM SERPL-SCNC: 140 MMOL/L (ref 135–147)
TRIGL SERPL-MCNC: 80 MG/DL

## 2024-06-14 PROCEDURE — 83036 HEMOGLOBIN GLYCOSYLATED A1C: CPT

## 2024-06-14 PROCEDURE — 36415 COLL VENOUS BLD VENIPUNCTURE: CPT

## 2024-06-14 PROCEDURE — 80053 COMPREHEN METABOLIC PANEL: CPT

## 2024-06-14 PROCEDURE — 82043 UR ALBUMIN QUANTITATIVE: CPT

## 2024-06-14 PROCEDURE — 82570 ASSAY OF URINE CREATININE: CPT

## 2024-06-14 PROCEDURE — 80061 LIPID PANEL: CPT

## 2024-06-18 ENCOUNTER — OFFICE VISIT (OUTPATIENT)
Dept: FAMILY MEDICINE CLINIC | Facility: CLINIC | Age: 74
End: 2024-06-18
Payer: MEDICARE

## 2024-06-18 VITALS
HEIGHT: 70 IN | DIASTOLIC BLOOD PRESSURE: 70 MMHG | HEART RATE: 63 BPM | RESPIRATION RATE: 16 BRPM | BODY MASS INDEX: 29.2 KG/M2 | OXYGEN SATURATION: 99 % | SYSTOLIC BLOOD PRESSURE: 122 MMHG | WEIGHT: 204 LBS

## 2024-06-18 DIAGNOSIS — H40.9 GLAUCOMA OF BOTH EYES, UNSPECIFIED GLAUCOMA TYPE: ICD-10-CM

## 2024-06-18 DIAGNOSIS — E11.9 TYPE 2 DIABETES MELLITUS WITHOUT COMPLICATION, WITHOUT LONG-TERM CURRENT USE OF INSULIN (HCC): Primary | ICD-10-CM

## 2024-06-18 DIAGNOSIS — L84 CALLUS OF FOOT: ICD-10-CM

## 2024-06-18 DIAGNOSIS — C61 PROSTATE CANCER (HCC): ICD-10-CM

## 2024-06-18 DIAGNOSIS — Z00.00 MEDICARE ANNUAL WELLNESS VISIT, SUBSEQUENT: ICD-10-CM

## 2024-06-18 DIAGNOSIS — E78.2 MIXED HYPERLIPIDEMIA: ICD-10-CM

## 2024-06-18 DIAGNOSIS — I10 ESSENTIAL HYPERTENSION: ICD-10-CM

## 2024-06-18 PROBLEM — Z23 ENCOUNTER FOR IMMUNIZATION: Status: ACTIVE | Noted: 2018-11-12

## 2024-06-18 PROCEDURE — 99214 OFFICE O/P EST MOD 30 MIN: CPT | Performed by: FAMILY MEDICINE

## 2024-06-18 PROCEDURE — G0439 PPPS, SUBSEQ VISIT: HCPCS | Performed by: FAMILY MEDICINE

## 2024-06-18 NOTE — PATIENT INSTRUCTIONS
Medicare Preventive Visit Patient Instructions  Thank you for completing your Welcome to Medicare Visit or Medicare Annual Wellness Visit today. Your next wellness visit will be due in one year (6/19/2025).  The screening/preventive services that you may require over the next 5-10 years are detailed below. Some tests may not apply to you based off risk factors and/or age. Screening tests ordered at today's visit but not completed yet may show as past due. Also, please note that scanned in results may not display below.  Preventive Screenings:  Service Recommendations Previous Testing/Comments   Colorectal Cancer Screening  Colonoscopy    Fecal Occult Blood Test (FOBT)/Fecal Immunochemical Test (FIT)  Fecal DNA/Cologuard Test  Flexible Sigmoidoscopy Age: 45-75 years old   Colonoscopy: every 10 years (May be performed more frequently if at higher risk)  OR  FOBT/FIT: every 1 year  OR  Cologuard: every 3 years  OR  Sigmoidoscopy: every 5 years  Screening may be recommended earlier than age 45 if at higher risk for colorectal cancer. Also, an individualized decision between you and your healthcare provider will decide whether screening between the ages of 76-85 would be appropriate. Colonoscopy: 10/17/2023  FOBT/FIT: Not on file  Cologuard: Not on file  Sigmoidoscopy: Not on file    Screening Current     Prostate Cancer Screening Individualized decision between patient and health care provider in men between ages of 55-69   Medicare will cover every 12 months beginning on the day after your 50th birthday PSA: 2.66 ng/mL     History Prostate Cancer     Hepatitis C Screening Once for adults born between 1945 and 1965  More frequently in patients at high risk for Hepatitis C Hep C Antibody: 11/19/2019    Screening Current   Diabetes Screening 1-2 times per year if you're at risk for diabetes or have pre-diabetes Fasting glucose: 110 mg/dL (6/14/2024)  A1C: 6.6 % (6/14/2024)  Screening Not Indicated  History Diabetes    Cholesterol Screening Once every 5 years if you don't have a lipid disorder. May order more often based on risk factors. Lipid panel: 06/14/2024  Screening Not Indicated  History Lipid Disorder      Other Preventive Screenings Covered by Medicare:  Abdominal Aortic Aneurysm (AAA) Screening: covered once if your at risk. You're considered to be at risk if you have a family history of AAA or a male between the age of 65-75 who smoking at least 100 cigarettes in your lifetime.  Lung Cancer Screening: covers low dose CT scan once per year if you meet all of the following conditions: (1) Age 55-77; (2) No signs or symptoms of lung cancer; (3) Current smoker or have quit smoking within the last 15 years; (4) You have a tobacco smoking history of at least 20 pack years (packs per day x number of years you smoked); (5) You get a written order from a healthcare provider.  Glaucoma Screening: covered annually if you're considered high risk: (1) You have diabetes OR (2) Family history of glaucoma OR (3)  aged 50 and older OR (4)  American aged 65 and older  Osteoporosis Screening: covered every 2 years if you meet one of the following conditions: (1) Have a vertebral abnormality; (2) On glucocorticoid therapy for more than 3 months; (3) Have primary hyperparathyroidism; (4) On osteoporosis medications and need to assess response to drug therapy.  HIV Screening: covered annually if you're between the age of 15-65. Also covered annually if you are younger than 15 and older than 65 with risk factors for HIV infection. For pregnant patients, it is covered up to 3 times per pregnancy.    Immunizations:  Immunization Recommendations   Influenza Vaccine Annual influenza vaccination during flu season is recommended for all persons aged >= 6 months who do not have contraindications   Pneumococcal Vaccine   * Pneumococcal conjugate vaccine = PCV13 (Prevnar 13), PCV15 (Vaxneuvance), PCV20 (Prevnar 20)  *  Pneumococcal polysaccharide vaccine = PPSV23 (Pneumovax) Adults 19-63 yo with certain risk factors or if 65+ yo  If never received any pneumonia vaccine: recommend Prevnar 20 (PCV20)  Give PCV20 if previously received 1 dose of PCV13 or PPSV23   Hepatitis B Vaccine 3 dose series if at intermediate or high risk (ex: diabetes, end stage renal disease, liver disease)   Respiratory syncytial virus (RSV) Vaccine - COVERED BY MEDICARE PART D  * RSVPreF3 (Arexvy) CDC recommends that adults 60 years of age and older may receive a single dose of RSV vaccine using shared clinical decision-making (SCDM)   Tetanus (Td) Vaccine - COST NOT COVERED BY MEDICARE PART B Following completion of primary series, a booster dose should be given every 10 years to maintain immunity against tetanus. Td may also be given as tetanus wound prophylaxis.   Tdap Vaccine - COST NOT COVERED BY MEDICARE PART B Recommended at least once for all adults. For pregnant patients, recommended with each pregnancy.   Shingles Vaccine (Shingrix) - COST NOT COVERED BY MEDICARE PART B  2 shot series recommended in those 19 years and older who have or will have weakened immune systems or those 50 years and older     Health Maintenance Due:      Topic Date Due   • Breast Cancer Screening: Mammogram  Never done   • Colorectal Cancer Screening  10/15/2030   • Hepatitis C Screening  Completed     Immunizations Due:      Topic Date Due   • Pneumococcal Vaccine: 65+ Years (1 of 2 - PCV) Never done   • COVID-19 Vaccine (5 - 2023-24 season) 09/01/2023   • Influenza Vaccine (Season Ended) 09/01/2024     Advance Directives   What are advance directives?  Advance directives are legal documents that state your wishes and plans for medical care. These plans are made ahead of time in case you lose your ability to make decisions for yourself. Advance directives can apply to any medical decision, such as the treatments you want, and if you want to donate organs.   What are the  types of advance directives?  There are many types of advance directives, and each state has rules about how to use them. You may choose a combination of any of the following:  Living will:  This is a written record of the treatment you want. You can also choose which treatments you do not want, which to limit, and which to stop at a certain time. This includes surgery, medicine, IV fluid, and tube feedings.   Durable power of  for healthcare (DPAHC):  This is a written record that states who you want to make healthcare choices for you when you are unable to make them for yourself. This person, called a proxy, is usually a family member or a friend. You may choose more than 1 proxy.  Do not resuscitate (DNR) order:  A DNR order is used in case your heart stops beating or you stop breathing. It is a request not to have certain forms of treatment, such as CPR. A DNR order may be included in other types of advance directives.  Medical directive:  This covers the care that you want if you are in a coma, near death, or unable to make decisions for yourself. You can list the treatments you want for each condition. Treatment may include pain medicine, surgery, blood transfusions, dialysis, IV or tube feedings, and a ventilator (breathing machine).  Values history:  This document has questions about your views, beliefs, and how you feel and think about life. This information can help others choose the care that you would choose.  Why are advance directives important?  An advance directive helps you control your care. Although spoken wishes may be used, it is better to have your wishes written down. Spoken wishes can be misunderstood, or not followed. Treatments may be given even if you do not want them. An advance directive may make it easier for your family to make difficult choices about your care.   Weight Management   Why it is important to manage your weight:  Being overweight increases your risk of health  conditions such as heart disease, high blood pressure, type 2 diabetes, and certain types of cancer. It can also increase your risk for osteoarthritis, sleep apnea, and other respiratory problems. Aim for a slow, steady weight loss. Even a small amount of weight loss can lower your risk of health problems.  How to lose weight safely:  A safe and healthy way to lose weight is to eat fewer calories and get regular exercise. You can lose up about 1 pound a week by decreasing the number of calories you eat by 500 calories each day.   Healthy meal plan for weight management:  A healthy meal plan includes a variety of foods, contains fewer calories, and helps you stay healthy. A healthy meal plan includes the following:  Eat whole-grain foods more often.  A healthy meal plan should contain fiber. Fiber is the part of grains, fruits, and vegetables that is not broken down by your body. Whole-grain foods are healthy and provide extra fiber in your diet. Some examples of whole-grain foods are whole-wheat breads and pastas, oatmeal, brown rice, and bulgur.  Eat a variety of vegetables every day.  Include dark, leafy greens such as spinach, kale, sherrie greens, and mustard greens. Eat yellow and orange vegetables such as carrots, sweet potatoes, and winter squash.   Eat a variety of fruits every day.  Choose fresh or canned fruit (canned in its own juice or light syrup) instead of juice. Fruit juice has very little or no fiber.  Eat low-fat dairy foods.  Drink fat-free (skim) milk or 1% milk. Eat fat-free yogurt and low-fat cottage cheese. Try low-fat cheeses such as mozzarella and other reduced-fat cheeses.  Choose meat and other protein foods that are low in fat.  Choose beans or other legumes such as split peas or lentils. Choose fish, skinless poultry (chicken or turkey), or lean cuts of red meat (beef or pork). Before you cook meat or poultry, cut off any visible fat.   Use less fat and oil.  Try baking foods instead of  frying them. Add less fat, such as margarine, sour cream, regular salad dressing and mayonnaise to foods. Eat fewer high-fat foods. Some examples of high-fat foods include french fries, doughnuts, ice cream, and cakes.  Eat fewer sweets.  Limit foods and drinks that are high in sugar. This includes candy, cookies, regular soda, and sweetened drinks.  Exercise:  Exercise at least 30 minutes per day on most days of the week. Some examples of exercise include walking, biking, dancing, and swimming. You can also fit in more physical activity by taking the stairs instead of the elevator or parking farther away from stores. Ask your healthcare provider about the best exercise plan for you.      © Copyright [employer] 2018 Information is for End User's use only and may not be sold, redistributed or otherwise used for commercial purposes. All illustrations and images included in CareNotes® are the copyrighted property of [employer], [employer]. or [employer]    Medicare Preventive Visit Patient Instructions  Thank you for completing your Welcome to Medicare Visit or Medicare Annual Wellness Visit today. Your next wellness visit will be due in one year (6/19/2025).  The screening/preventive services that you may require over the next 5-10 years are detailed below. Some tests may not apply to you based off risk factors and/or age. Screening tests ordered at today's visit but not completed yet may show as past due. Also, please note that scanned in results may not display below.  Preventive Screenings:  Service Recommendations Previous Testing/Comments   Colorectal Cancer Screening  Colonoscopy    Fecal Occult Blood Test (FOBT)/Fecal Immunochemical Test (FIT)  Fecal DNA/Cologuard Test  Flexible Sigmoidoscopy Age: 45-75 years old   Colonoscopy: every 10 years (May be performed more frequently if at higher risk)  OR  FOBT/FIT: every 1 year  OR  Cologuard: every 3 years  OR  Sigmoidoscopy: every 5 years  Screening may be  recommended earlier than age 45 if at higher risk for colorectal cancer. Also, an individualized decision between you and your healthcare provider will decide whether screening between the ages of 76-85 would be appropriate. Colonoscopy: 10/17/2023  FOBT/FIT: Not on file  Cologuard: Not on file  Sigmoidoscopy: Not on file    Screening Current     Prostate Cancer Screening Individualized decision between patient and health care provider in men between ages of 55-69   Medicare will cover every 12 months beginning on the day after your 50th birthday PSA: 2.66 ng/mL     History Prostate Cancer     Hepatitis C Screening Once for adults born between 1945 and 1965  More frequently in patients at high risk for Hepatitis C Hep C Antibody: 11/19/2019    Screening Current   Diabetes Screening 1-2 times per year if you're at risk for diabetes or have pre-diabetes Fasting glucose: 110 mg/dL (6/14/2024)  A1C: 6.6 % (6/14/2024)  Screening Not Indicated  History Diabetes   Cholesterol Screening Once every 5 years if you don't have a lipid disorder. May order more often based on risk factors. Lipid panel: 06/14/2024  Screening Not Indicated  History Lipid Disorder      Other Preventive Screenings Covered by Medicare:  Abdominal Aortic Aneurysm (AAA) Screening: covered once if your at risk. You're considered to be at risk if you have a family history of AAA or a male between the age of 65-75 who smoking at least 100 cigarettes in your lifetime.  Lung Cancer Screening: covers low dose CT scan once per year if you meet all of the following conditions: (1) Age 55-77; (2) No signs or symptoms of lung cancer; (3) Current smoker or have quit smoking within the last 15 years; (4) You have a tobacco smoking history of at least 20 pack years (packs per day x number of years you smoked); (5) You get a written order from a healthcare provider.  Glaucoma Screening: covered annually if you're considered high risk: (1) You have diabetes OR (2)  Family history of glaucoma OR (3)  aged 50 and older OR (4)  American aged 65 and older  Osteoporosis Screening: covered every 2 years if you meet one of the following conditions: (1) Have a vertebral abnormality; (2) On glucocorticoid therapy for more than 3 months; (3) Have primary hyperparathyroidism; (4) On osteoporosis medications and need to assess response to drug therapy.  HIV Screening: covered annually if you're between the age of 15-65. Also covered annually if you are younger than 15 and older than 65 with risk factors for HIV infection. For pregnant patients, it is covered up to 3 times per pregnancy.    Immunizations:  Immunization Recommendations   Influenza Vaccine Annual influenza vaccination during flu season is recommended for all persons aged >= 6 months who do not have contraindications   Pneumococcal Vaccine   * Pneumococcal conjugate vaccine = PCV13 (Prevnar 13), PCV15 (Vaxneuvance), PCV20 (Prevnar 20)  * Pneumococcal polysaccharide vaccine = PPSV23 (Pneumovax) Adults 19-63 yo with certain risk factors or if 65+ yo  If never received any pneumonia vaccine: recommend Prevnar 20 (PCV20)  Give PCV20 if previously received 1 dose of PCV13 or PPSV23   Hepatitis B Vaccine 3 dose series if at intermediate or high risk (ex: diabetes, end stage renal disease, liver disease)   Respiratory syncytial virus (RSV) Vaccine - COVERED BY MEDICARE PART D  * RSVPreF3 (Arexvy) CDC recommends that adults 60 years of age and older may receive a single dose of RSV vaccine using shared clinical decision-making (SCDM)   Tetanus (Td) Vaccine - COST NOT COVERED BY MEDICARE PART B Following completion of primary series, a booster dose should be given every 10 years to maintain immunity against tetanus. Td may also be given as tetanus wound prophylaxis.   Tdap Vaccine - COST NOT COVERED BY MEDICARE PART B Recommended at least once for all adults. For pregnant patients, recommended with each  pregnancy.   Shingles Vaccine (Shingrix) - COST NOT COVERED BY MEDICARE PART B  2 shot series recommended in those 19 years and older who have or will have weakened immune systems or those 50 years and older     Health Maintenance Due:      Topic Date Due   • Breast Cancer Screening: Mammogram  Never done   • Colorectal Cancer Screening  10/15/2030   • Hepatitis C Screening  Completed     Immunizations Due:      Topic Date Due   • Pneumococcal Vaccine: 65+ Years (1 of 2 - PCV) Never done   • COVID-19 Vaccine (5 - 2023-24 season) 09/01/2023   • Influenza Vaccine (Season Ended) 09/01/2024     Advance Directives   What are advance directives?  Advance directives are legal documents that state your wishes and plans for medical care. These plans are made ahead of time in case you lose your ability to make decisions for yourself. Advance directives can apply to any medical decision, such as the treatments you want, and if you want to donate organs.   What are the types of advance directives?  There are many types of advance directives, and each state has rules about how to use them. You may choose a combination of any of the following:  Living will:  This is a written record of the treatment you want. You can also choose which treatments you do not want, which to limit, and which to stop at a certain time. This includes surgery, medicine, IV fluid, and tube feedings.   Durable power of  for healthcare (DPAHC):  This is a written record that states who you want to make healthcare choices for you when you are unable to make them for yourself. This person, called a proxy, is usually a family member or a friend. You may choose more than 1 proxy.  Do not resuscitate (DNR) order:  A DNR order is used in case your heart stops beating or you stop breathing. It is a request not to have certain forms of treatment, such as CPR. A DNR order may be included in other types of advance directives.  Medical directive:  This covers  the care that you want if you are in a coma, near death, or unable to make decisions for yourself. You can list the treatments you want for each condition. Treatment may include pain medicine, surgery, blood transfusions, dialysis, IV or tube feedings, and a ventilator (breathing machine).  Values history:  This document has questions about your views, beliefs, and how you feel and think about life. This information can help others choose the care that you would choose.  Why are advance directives important?  An advance directive helps you control your care. Although spoken wishes may be used, it is better to have your wishes written down. Spoken wishes can be misunderstood, or not followed. Treatments may be given even if you do not want them. An advance directive may make it easier for your family to make difficult choices about your care.   Weight Management   Why it is important to manage your weight:  Being overweight increases your risk of health conditions such as heart disease, high blood pressure, type 2 diabetes, and certain types of cancer. It can also increase your risk for osteoarthritis, sleep apnea, and other respiratory problems. Aim for a slow, steady weight loss. Even a small amount of weight loss can lower your risk of health problems.  How to lose weight safely:  A safe and healthy way to lose weight is to eat fewer calories and get regular exercise. You can lose up about 1 pound a week by decreasing the number of calories you eat by 500 calories each day.   Healthy meal plan for weight management:  A healthy meal plan includes a variety of foods, contains fewer calories, and helps you stay healthy. A healthy meal plan includes the following:  Eat whole-grain foods more often.  A healthy meal plan should contain fiber. Fiber is the part of grains, fruits, and vegetables that is not broken down by your body. Whole-grain foods are healthy and provide extra fiber in your diet. Some examples of  whole-grain foods are whole-wheat breads and pastas, oatmeal, brown rice, and bulgur.  Eat a variety of vegetables every day.  Include dark, leafy greens such as spinach, kale, sherrie greens, and mustard greens. Eat yellow and orange vegetables such as carrots, sweet potatoes, and winter squash.   Eat a variety of fruits every day.  Choose fresh or canned fruit (canned in its own juice or light syrup) instead of juice. Fruit juice has very little or no fiber.  Eat low-fat dairy foods.  Drink fat-free (skim) milk or 1% milk. Eat fat-free yogurt and low-fat cottage cheese. Try low-fat cheeses such as mozzarella and other reduced-fat cheeses.  Choose meat and other protein foods that are low in fat.  Choose beans or other legumes such as split peas or lentils. Choose fish, skinless poultry (chicken or turkey), or lean cuts of red meat (beef or pork). Before you cook meat or poultry, cut off any visible fat.   Use less fat and oil.  Try baking foods instead of frying them. Add less fat, such as margarine, sour cream, regular salad dressing and mayonnaise to foods. Eat fewer high-fat foods. Some examples of high-fat foods include french fries, doughnuts, ice cream, and cakes.  Eat fewer sweets.  Limit foods and drinks that are high in sugar. This includes candy, cookies, regular soda, and sweetened drinks.  Exercise:  Exercise at least 30 minutes per day on most days of the week. Some examples of exercise include walking, biking, dancing, and swimming. You can also fit in more physical activity by taking the stairs instead of the elevator or parking farther away from stores. Ask your healthcare provider about the best exercise plan for you.      © Copyright [employer] 2018 Information is for End User's use only and may not be sold, redistributed or otherwise used for commercial purposes. All illustrations and images included in CareNotes® are the copyrighted property of [employer] or [employer]

## 2024-06-18 NOTE — ASSESSMENT & PLAN NOTE
A1c improved, as he stopped drinking alcohol, continue metformin, usually he eats 1 meal and takes 1 metformin and do regular aerobic exercise  Lab Results   Component Value Date    HGBA1C 6.6 (H) 06/14/2024

## 2024-06-18 NOTE — PROGRESS NOTES
Ambulatory Visit  Name: Samuel Knight      : 1950      MRN: [id number]  Encounter Provider: Sharon Pavon MD  Encounter Date: 2024   Encounter department: [facility]    Assessment & Plan   1. Medicare annual wellness visit, subsequent  2. Type 2 diabetes mellitus without complication, without long-term current use of insulin (HCC)  Assessment & Plan:  A1c improved, as he stopped drinking alcohol, continue metformin, usually he eats 1 meal and takes 1 metformin and do regular aerobic exercise  Lab Results   Component Value Date    HGBA1C 6.6 (H) 2024     Orders:  -     Diabetic foot exam; Future  -     Comprehensive metabolic panel; Future; Expected date: 10/16/2024  -     Hemoglobin A1C; Future; Expected date: 10/16/2024  -     CBC and Platelet; Future; Expected date: 10/16/2024  -     TSH, 3rd generation with Free T4 reflex; Future; Expected date: 10/16/2024  -     Lipid Panel with Direct LDL reflex; Future; Expected date: 10/16/2024  3. Mixed hyperlipidemia  Assessment & Plan:  Continue statin and low-fat diet and cholesterol improved  Orders:  -     Comprehensive metabolic panel; Future; Expected date: 10/16/2024  -     TSH, 3rd generation with Free T4 reflex; Future; Expected date: 10/16/2024  -     Lipid Panel with Direct LDL reflex; Future; Expected date: 10/16/2024  4. Essential hypertension  Assessment & Plan:  Continue amlodipine, his blood pressure is well-controlled, continue same medication ,if I add more medication like ACE inhibitor or ARB ,his blood pressure will drop  5. Glaucoma of both eyes, unspecified glaucoma type  Assessment & Plan:  Follows ophthalmologist and he will see him again in . Callus of foot  Assessment & Plan:  Callus on the front part of the right foot under the big toe, advised to see the podiatrist       Preventive health issues were discussed with patient, and age appropriate screening tests were ordered as noted in patient's  After Visit Summary. Personalized health advice and appropriate referrals for health education or preventive services given if needed, as noted in patient's After Visit Summary.    History of Present Illness     AWV, and follow up,  He stopped drinking alcohol, he says he ate 1 meal a day or sometimes he skips and he takes metformin when he eats food, he is following oncology and urology and has a recent PET scan and stable recent PSA was normal range, he is very active and doing exercise with the          Patient Care Team:  Sharon Pavon MD as PCP - General  MD Lisa Godfrey RD (Nutrition)  Edwardo Cannon MD as Endoscopist  Hattie Cobb MA as Care Coordinator (Oncology)  Angel Carpenter MD as Medical Oncologist (Hematology)    Review of Systems   Constitutional:  Negative for activity change, appetite change, chills, fatigue, fever and unexpected weight change.   HENT:  Negative for congestion, ear discharge, ear pain, nosebleeds, postnasal drip, rhinorrhea, sinus pressure, sneezing, sore throat, trouble swallowing and voice change.    Eyes:  Negative for photophobia, pain, discharge, redness and itching.   Respiratory:  Negative for cough, chest tightness, shortness of breath and wheezing.    Cardiovascular:  Negative for chest pain, palpitations and leg swelling.   Gastrointestinal:  Negative for abdominal pain, constipation, diarrhea, nausea and vomiting.   Endocrine: Negative for polyuria.   Genitourinary:  Negative for dysuria, frequency and urgency.   Musculoskeletal:  Negative for arthralgias, back pain, myalgias and neck pain.   Skin:  Negative for color change, pallor and rash.   Allergic/Immunologic: Negative for environmental allergies and food allergies.   Neurological:  Negative for dizziness, weakness, light-headedness and headaches.   Hematological:  Negative for adenopathy. Does not bruise/bleed easily.   Psychiatric/Behavioral:  Negative for behavioral  problems. The patient is not nervous/anxious.      Medical History Reviewed by provider this encounter:  Tobacco  Allergies  Meds  Problems  Med Hx  Surg Hx  Fam Hx       Annual Wellness Visit Questionnaire   Samuel is here for Samuel's Subsequent Wellness visit.     Health Risk Assessment:   Patient rates overall health as very good. Patient feels that their physical health rating is same. Patient is satisfied with their life. Eyesight was rated as same. Hearing was rated as same. Patient feels that their emotional and mental health rating is slightly better. Patients states they are never, rarely angry. Patient states they are never, rarely unusually tired/fatigued. Pain experienced in the last 7 days has been none.     Depression Screening:   PHQ-2 Score: 0      Fall Risk Screening:   In the past year, patient has experienced: no history of falling in past year      Home Safety:  Patient does not have trouble with stairs inside or outside of their home. Patient has working smoke alarms and has working carbon monoxide detector. Home safety hazards include: none.     Nutrition:   Current diet is Regular, Low Carb and No Added Salt.     Medications:   Patient is currently taking over-the-counter supplements. OTC medications include: see medication list. Patient is able to manage medications.     Activities of Daily Living (ADLs)/Instrumental Activities of Daily Living (IADLs):   Walk and transfer into and out of bed and chair?: Yes  Dress and groom yourself?: Yes    Bathe or shower yourself?: Yes    Feed yourself? Yes  Do your laundry/housekeeping?: Yes  Manage your money, pay your bills and track your expenses?: Yes  Make your own meals?: Yes    Do your own shopping?: Yes    Previous Hospitalizations:   Any hospitalizations or ED visits within the last 12 months?: No      Advance Care Planning:   Living will: Yes    Durable POA for healthcare: Yes    Advanced directive: Yes      Comments: Wife is power of  "     Cognitive Screening:   Provider or family/friend/caregiver concerned regarding cognition?: No    PREVENTIVE SCREENINGS      Cardiovascular Screening:    General: Screening Not Indicated and History Lipid Disorder      Diabetes Screening:     General: Screening Not Indicated and History Diabetes      Colorectal Cancer Screening:     General: Screening Current      Prostate Cancer Screening:    General: History Prostate Cancer      Osteoporosis Screening:    General: Screening Current      Abdominal Aortic Aneurysm (AAA) Screening:    Risk factors include: age between 65-76 yo and tobacco use        General: Screening Not Indicated      Lung Cancer Screening:     General: Screening Not Indicated      Hepatitis C Screening:    General: Screening Current    Screening, Brief Intervention, and Referral to Treatment (SBIRT)    Screening  Typical number of drinks in a day: 0  Typical number of drinks in a week: 0  Interpretation: Low risk drinking behavior.       No results found.    Objective     /70   Pulse 63   Resp 16   Ht 5' 10\" (1.778 m)   Wt 92.5 kg (204 lb)   SpO2 99%   BMI 29.27 kg/m²     Physical Exam  Vitals and nursing note reviewed.   Constitutional:       General: Samuel is not in acute distress.     Appearance: Normal appearance. Samuel is not ill-appearing.   HENT:      Head: Normocephalic and atraumatic.      Right Ear: Tympanic membrane and ear canal normal. There is no impacted cerumen.      Left Ear: Tympanic membrane and ear canal normal. There is no impacted cerumen.      Nose: Nose normal. No rhinorrhea.      Mouth/Throat:      Mouth: Mucous membranes are moist.      Pharynx: Oropharynx is clear. No oropharyngeal exudate or posterior oropharyngeal erythema.   Eyes:      Extraocular Movements: Extraocular movements intact.      Conjunctiva/sclera: Conjunctivae normal.   Neck:      Vascular: No carotid bruit.   Cardiovascular:      Rate and Rhythm: Normal rate and regular " rhythm.      Pulses: no weak pulses.           Dorsalis pedis pulses are 2+ on the right side and 2+ on the left side.      Heart sounds: No murmur heard.  Pulmonary:      Effort: Pulmonary effort is normal.      Breath sounds: Normal breath sounds. No wheezing or rales.   Abdominal:      General: Abdomen is flat. Bowel sounds are normal. There is no distension.      Palpations: Abdomen is soft. There is no mass.      Tenderness: There is no abdominal tenderness. There is no guarding.   Musculoskeletal:         General: No swelling, tenderness, deformity or signs of injury. Normal range of motion.      Cervical back: Normal range of motion and neck supple.   Feet:      Right foot:      Skin integrity: Callus present. No ulcer, skin breakdown, erythema, warmth or dry skin.      Left foot:      Skin integrity: No ulcer, skin breakdown, erythema, warmth, callus or dry skin.   Skin:     Coloration: Skin is not jaundiced or pale.      Findings: No rash.   Neurological:      General: No focal deficit present.      Mental Status: Samuel is alert and oriented to person, place, and time.      Motor: No weakness.   Psychiatric:         Mood and Affect: Mood normal.         Behavior: Behavior normal.         Thought Content: Thought content normal.         Judgment: Judgment normal.       Administrative Statements       Diabetic Foot Exam    Patient's shoes and socks removed.    Right Foot/Ankle   Right Foot Inspection  Skin Exam: skin normal, callus and callus. Skin not intact, no dry skin, no warmth, no erythema, no maceration, no abnormal color, no pre-ulcer and no ulcer.     Toe Exam: ROM and strength within normal limits.     Sensory   Vibration: intact  Proprioception: intact  Monofilament testing: intact    Vascular  Capillary refills: < 3 seconds  The right DP pulse is 2+.     Left Foot/Ankle  Left Foot Inspection  Skin Exam: skin normal. Skin not intact, no dry skin, no warmth, no erythema, no maceration, normal  color, no pre-ulcer, no ulcer and no callus.     Toe Exam: ROM and strength within normal limits.     Sensory   Vibration: intact  Proprioception: intact  Monofilament testing: intact    Vascular  Capillary refills: < 3 seconds  The left DP pulse is 2+.     Assign Risk Category  No deformity present  No loss of protective sensation  No weak pulses  Risk: 0

## 2024-06-18 NOTE — ASSESSMENT & PLAN NOTE
Continue amlodipine, his blood pressure is well-controlled, continue same medication ,if I add more medication like ACE inhibitor or ARB ,his blood pressure will drop

## 2024-06-20 ENCOUNTER — OFFICE VISIT (OUTPATIENT)
Dept: HEMATOLOGY ONCOLOGY | Facility: CLINIC | Age: 74
End: 2024-06-20
Payer: MEDICARE

## 2024-06-20 VITALS
BODY MASS INDEX: 28.92 KG/M2 | DIASTOLIC BLOOD PRESSURE: 78 MMHG | RESPIRATION RATE: 18 BRPM | HEART RATE: 89 BPM | WEIGHT: 202 LBS | HEIGHT: 70 IN | SYSTOLIC BLOOD PRESSURE: 124 MMHG | TEMPERATURE: 97.5 F | OXYGEN SATURATION: 98 %

## 2024-06-20 DIAGNOSIS — C79.51 NEOPLASM OF PROSTATE, DISTANT METASTASIS STAGING CATEGORY M1B: METASTASIS TO BONE (HCC): Primary | ICD-10-CM

## 2024-06-20 DIAGNOSIS — C61 NEOPLASM OF PROSTATE, DISTANT METASTASIS STAGING CATEGORY M1B: METASTASIS TO BONE (HCC): Primary | ICD-10-CM

## 2024-06-20 PROCEDURE — 99214 OFFICE O/P EST MOD 30 MIN: CPT | Performed by: INTERNAL MEDICINE

## 2024-06-20 NOTE — PROGRESS NOTES
Hematology Outpatient Follow - Up Note  Samuel Knight 74 y.o. adult MRN: @ Encounter: [id number]        Date:  6/20/2024        Assessment/ Plan:    74-year-old -American male who was diagnosed at age 54 with prostate cancer [place] score of 6 status post radiation therapy and intermittent Lupron Lupron was discontinued later on because of sexual side effects, the initial diagnosis was done in [facility]     I do not have any previous medical records on the patient however he had metastatic disease in 2016 by rising PSA he received Lupron     PSMA scan on 11/2022 showed possible right hemipelvis bony disease     PSA was 0.7 on 10/2003     However on 4/2024 went up to 5.6    Liquid biopsy showed BRCA1 mutation he will be candidate for PARP inhibitor as well as androgen inhibitor in the near future    PET scan showed activity in the left aspect of the prostate possible bony lesion he received Firmagon and later on Lupron and PSA went down to 2.6 then at this time he will continue to follow-up with urology and will follow-up in 6 months after the PSA level        Labs and imaging studies are reviewed by ordering provider once results are available. If there are findings that need immediate attention, you will be contacted when results available.   Discussing results and the implication on your healthcare is best discussed in person at your follow-up visit.       HPI:  74-year-old -American male with history of glaucoma, hypertension, diabetes mellitus type 2 who was diagnosed with prostate cancer in 2004 in [facility], according to the patient was Kami score of 5 status post radiation therapy and he received Lupron intermittently, I do not know the PSA at that time     He had been followed by urology group according to the patient he had metastatic disease in 2016 after rising PSA he was managed on androgen deprivation therapy with Lupron and elected to discontinue it due to the sexual  side effect  PSMA scan on 11/2022 showed focal area of increased activity within the left posterior peripheral prostate zone with SUV of 4 concerning for residual or recurrent disease, no evidence of pelvic adenopathy, slight asymmetry in the pelvic bone more on the right than the left also was found on the CAT scan in 2017 monitor present stable metastatic in the right hemipelvis 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    Interval History:        Previous Treatment:         Test Results:    Imaging: NM PET CT skull base to mid thigh    Result Date: 6/11/2024  Narrative: PYLARIFY PSMA PET/CT SCAN INDICATION:  C61: Malignant neoplasm of prostate R97.21: Rising PSA following treatment for malignant neoplasm of prostate The following clinical information was obtained directly from EPIC: PSA values:  5.61 (4/5/24), 1.63 (1/23/24), 0.74 (10/5/23). MODIFIER: PS COMPARISON: PSMA PET/CT dated 11/2/2022, CT of pelvis dated April 18, 2024, and bone scan dated 6/3/2021 CELL TYPE:  Kami 6  - no path report available (biopsy of prostate done in 2004 in [place]) TECHNIQUE:   9.7 mCi Pylarify PSMA administered IV. Multiplanar attenuation corrected and non attenuation corrected PET images were acquired 60 minutes post injection. Contiguous, low dose, axial CT sections were obtained from the vertex through the femurs .   Intravenous or oral contrast was not utilized.  This examination, like all CT scans performed in the [facility], was performed utilizing techniques to minimize radiation dose exposure, including the use of iterative reconstruction and automated exposure control. FINDINGS: VISUALIZED BRAIN: No acute abnormalities are seen. HEAD/NECK: There is a physiologic distribution of the radiotracer. No PSMA avid cervical adenopathy is seen. CT images: Mild mucosal thickening involving the right maxillary sinus. CHEST: No PSMA avid soft tissue lesions are seen. CT images: Trace gynecomastia. Atherosclerotic vascular calcifications  are noted. ABDOMEN: No PSMA avid soft tissue lesions are seen. CT images: Gallbladder is not definitively visualized. Atherosclerotic vascular calcifications are noted. Diverticulosis coli. Small fat-containing periumbilical hernia. Fairly symmetric subcutaneous fat stranding involving the bilateral aspects of the anterior abdominal wall, most likely inflammatory in etiology. PELVIS: Best seen on PET image 267 of series 12 is persistent asymmetric focal tracer activity involving the left posterolateral peripheral prostate gland with max SUV of 4.3, previously 4.0, suspicious for intraprostatic PSMA positive malignancy. Evaluation for  extracapsular extension of tumor is limited on low-dose unenhanced CT. CT images: Unremarkable. OSSEOUS STRUCTURES: Again noted is subtle asymmetric low-level patchy tracer activity associated with asymmetric patchy sclerosis involving the right iliac bone (for example anterior right iliac bone on PET image 217 with max SUV of 1.3). Findings are suspicious for subtle persistent underlying PSMA positive osseous metastatic disease with differential diagnosis also including Paget's disease. No new focal osseous activity. CT images: Degenerative changes are noted involving the spine.     Impression: No significant interval change. Persistent asymmetric nodular tracer activity involving the peripheral left prostate gland suspicious for intraprostatic PSMA positive malignancy. Persistent subtle asymmetric low-level patchy tracer activity associated with asymmetric patchy sclerosis involving the right iliac bone suspicious for subtle persistent underlying PSMA positive osseous metastatic disease with differential diagnosis including Paget's disease. Please see above for details and additional findings. Workstation performed: [id number]       Labs:   Lab Results   Component Value Date    WBC 4.54 01/23/2024    HGB 13.9 01/23/2024    HCT 44.6 01/23/2024    MCV 80 (L) 01/23/2024      "01/23/2024     Lab Results   Component Value Date    K 4.4 06/14/2024     06/14/2024    CO2 29 06/14/2024    BUN 11 06/14/2024    CREATININE 0.70 06/14/2024    GLUF 110 (H) 06/14/2024    CALCIUM 9.1 06/14/2024    CORRECTEDCA 10.1 07/20/2020    AST 19 06/14/2024    ALT 12 06/14/2024    ALKPHOS 95 06/14/2024    EGFR 92 08/28/2023       No results found for: \"IRON\", \"TIBC\", \"FERRITIN\"    No results found for: \"[id number]\"      ROS: Review of Systems   Constitutional: Negative.  Negative for appetite change, chills, diaphoresis, fatigue, fever and unexpected weight change.   HENT:   Negative for hearing loss, lump/mass, mouth sores, nosebleeds, sore throat, trouble swallowing and voice change.    Eyes: Negative.  Negative for eye problems and icterus.   Respiratory: Negative.  Negative for chest tightness, cough, hemoptysis and shortness of breath.    Cardiovascular:  Negative for chest pain and leg swelling.   Gastrointestinal:  Negative for abdominal distention, abdominal pain, blood in stool, constipation, diarrhea and nausea.   Endocrine: Negative.    Genitourinary:  Negative for dysuria, frequency, hematuria and pelvic pain.    Musculoskeletal: Negative.  Negative for arthralgias, back pain, flank pain, gait problem, myalgias and neck stiffness.   Skin:  Negative for itching and rash.   Neurological:  Negative for dizziness, gait problem, headaches, light-headedness, numbness and speech difficulty.   Hematological:  Negative for adenopathy. Does not bruise/bleed easily.   Psychiatric/Behavioral:  Negative for confusion, decreased concentration, depression and sleep disturbance. The patient is not nervous/anxious.           Current Medications: Reviewed  Allergies: Reviewed  PMH/FH/SH:  Reviewed      Physical Exam:    Body surface area is 2.1 meters squared.    Wt Readings from Last 3 Encounters:   06/20/24 91.6 kg (202 lb)   06/18/24 92.5 kg (204 lb)   05/09/24 92.1 kg (203 lb)        Temp Readings from Last " 3 Encounters:   06/20/24 97.5 °F (36.4 °C) (Tympanic)   05/01/24 97.6 °F (36.4 °C) (Temporal)   10/17/23 (!) 96.2 °F (35.7 °C) (Temporal)        BP Readings from Last 3 Encounters:   06/20/24 124/78   06/18/24 122/70   05/09/24 150/82         Pulse Readings from Last 3 Encounters:   06/20/24 89   06/18/24 63   05/09/24 64        Physical Exam  Constitutional:       Appearance: Samuel is well-developed.   HENT:      Head: Normocephalic.   Eyes:      Pupils: Pupils are equal, round, and reactive to light.   Cardiovascular:      Rate and Rhythm: Normal rate and regular rhythm.   Pulmonary:      Effort: Pulmonary effort is normal.   Chest:   Breasts:     Breasts are symmetrical.      Right: No inverted nipple, mass, nipple discharge, skin change or tenderness.      Left: No inverted nipple, mass, nipple discharge, skin change or tenderness.   Abdominal:      General: There is no distension.      Palpations: Abdomen is soft. There is no hepatomegaly or splenomegaly.      Tenderness: There is no abdominal tenderness. There is no guarding or rebound.   Musculoskeletal:         General: No deformity. Normal range of motion.      Cervical back: Normal range of motion.      Right lower leg: No edema.      Left lower leg: No edema.   Skin:     Findings: No erythema or rash.         ECOG PS:0    Goals and Barriers:  Current Goal: Minimize effects of disease.   Barriers: None.      Patient's Capacity to Self Care:  Patient is able to self care.    Code Status: @[facility]@

## 2024-08-08 ENCOUNTER — APPOINTMENT (OUTPATIENT)
Dept: LAB | Facility: AMBULARY SURGERY CENTER | Age: 74
End: 2024-08-08
Payer: MEDICARE

## 2024-08-08 DIAGNOSIS — C61 PROSTATE CANCER (HCC): ICD-10-CM

## 2024-08-08 LAB — PSA SERPL-MCNC: 1.9 NG/ML (ref 0–4)

## 2024-08-08 PROCEDURE — 36415 COLL VENOUS BLD VENIPUNCTURE: CPT

## 2024-08-08 PROCEDURE — 84153 ASSAY OF PSA TOTAL: CPT

## 2024-10-25 ENCOUNTER — APPOINTMENT (OUTPATIENT)
Dept: LAB | Facility: AMBULARY SURGERY CENTER | Age: 74
End: 2024-10-25
Payer: MEDICARE

## 2024-10-25 DIAGNOSIS — C61 PROSTATE CANCER (HCC): ICD-10-CM

## 2024-10-25 DIAGNOSIS — E11.9 TYPE 2 DIABETES MELLITUS WITHOUT COMPLICATION, WITHOUT LONG-TERM CURRENT USE OF INSULIN (HCC): ICD-10-CM

## 2024-10-25 DIAGNOSIS — E78.2 MIXED HYPERLIPIDEMIA: ICD-10-CM

## 2024-10-25 LAB
ALBUMIN SERPL BCG-MCNC: 4.4 G/DL (ref 3.5–5)
ALP SERPL-CCNC: 104 U/L (ref 34–104)
ALT SERPL W P-5'-P-CCNC: 17 U/L (ref 7–52)
ANION GAP SERPL CALCULATED.3IONS-SCNC: 9 MMOL/L (ref 4–13)
AST SERPL W P-5'-P-CCNC: 23 U/L (ref 5–45)
BILIRUB SERPL-MCNC: 0.74 MG/DL (ref 0.2–1)
BUN SERPL-MCNC: 15 MG/DL (ref 5–25)
CALCIUM SERPL-MCNC: 9.7 MG/DL (ref 8.4–10.2)
CHLORIDE SERPL-SCNC: 101 MMOL/L (ref 96–108)
CHOLEST SERPL-MCNC: 181 MG/DL
CO2 SERPL-SCNC: 31 MMOL/L (ref 21–32)
CREAT SERPL-MCNC: 0.68 MG/DL (ref 0.6–1.3)
ERYTHROCYTE [DISTWIDTH] IN BLOOD BY AUTOMATED COUNT: 15.3 % (ref 11.6–15.1)
EST. AVERAGE GLUCOSE BLD GHB EST-MCNC: 143 MG/DL
GLUCOSE P FAST SERPL-MCNC: 119 MG/DL (ref 65–99)
HBA1C MFR BLD: 6.6 %
HCT VFR BLD AUTO: 45.4 % (ref 36.5–46.1)
HDLC SERPL-MCNC: 71 MG/DL
HGB BLD-MCNC: 13.8 G/DL (ref 12–15.4)
LDLC SERPL CALC-MCNC: 94 MG/DL (ref 0–100)
MCH RBC QN AUTO: 24.5 PG (ref 26.8–34.3)
MCHC RBC AUTO-ENTMCNC: 30.4 G/DL (ref 31.4–37.4)
MCV RBC AUTO: 81 FL (ref 82–98)
PLATELET # BLD AUTO: 217 THOUSANDS/UL (ref 149–390)
PMV BLD AUTO: 11.9 FL (ref 8.9–12.7)
POTASSIUM SERPL-SCNC: 3.9 MMOL/L (ref 3.5–5.3)
PROT SERPL-MCNC: 7.5 G/DL (ref 6.4–8.4)
PSA SERPL-MCNC: 1.96 NG/ML (ref 0–4)
RBC # BLD AUTO: 5.64 MILLION/UL (ref 3.88–5.12)
SODIUM SERPL-SCNC: 141 MMOL/L (ref 135–147)
TRIGL SERPL-MCNC: 81 MG/DL
TSH SERPL DL<=0.05 MIU/L-ACNC: 1.4 UIU/ML (ref 0.45–4.5)
WBC # BLD AUTO: 5.59 THOUSAND/UL (ref 4.31–10.16)

## 2024-10-25 PROCEDURE — 80053 COMPREHEN METABOLIC PANEL: CPT

## 2024-10-25 PROCEDURE — 83036 HEMOGLOBIN GLYCOSYLATED A1C: CPT

## 2024-10-25 PROCEDURE — 84153 ASSAY OF PSA TOTAL: CPT

## 2024-10-25 PROCEDURE — 84443 ASSAY THYROID STIM HORMONE: CPT

## 2024-10-25 PROCEDURE — 36415 COLL VENOUS BLD VENIPUNCTURE: CPT

## 2024-10-25 PROCEDURE — 85027 COMPLETE CBC AUTOMATED: CPT

## 2024-10-25 PROCEDURE — 80061 LIPID PANEL: CPT

## 2024-10-28 ENCOUNTER — OFFICE VISIT (OUTPATIENT)
Dept: UROLOGY | Facility: CLINIC | Age: 74
End: 2024-10-28
Payer: MEDICARE

## 2024-10-28 VITALS
BODY MASS INDEX: 28.77 KG/M2 | HEART RATE: 67 BPM | DIASTOLIC BLOOD PRESSURE: 80 MMHG | OXYGEN SATURATION: 97 % | HEIGHT: 70 IN | SYSTOLIC BLOOD PRESSURE: 120 MMHG | WEIGHT: 201 LBS

## 2024-10-28 DIAGNOSIS — C61 PROSTATE CANCER (HCC): Primary | ICD-10-CM

## 2024-10-28 PROCEDURE — 96402 CHEMO HORMON ANTINEOPL SQ/IM: CPT

## 2024-10-28 PROCEDURE — 99213 OFFICE O/P EST LOW 20 MIN: CPT | Performed by: PHYSICIAN ASSISTANT

## 2024-10-28 NOTE — PROGRESS NOTES
UROLOGY PROGRESS NOTE   Patient Identifiers: Samuel Knight (MRN [id number])  Date of Service: 10/28/2024    Subjective:   74-year-old man history of metastatic prostate cancer.  He was treated in [place] in 2004 with radiation and ADT.  PSA eliazar to 5.61.  PET scan showed persistent asymmetric nodular activity throughout the left prostate gland.  There is also persistent subtle low-level activity and patchy sclerosis involving the right iliac bone.  He is currently on Lupron alone.  Liquid biopsy showed BRCA 1 mutation and he would be a candidate for PARP inhibitor as well as androgen inhibitor per medical oncology.  Current PSA 1.96 and stable.  Denies any bone pain or weight loss.    Reason for visit: Metastatic prostate cancer follow-up    Objective:     VITALS:    There were no vitals filed for this visit.  AUA SYMPTOM SCORE      Flowsheet Row Most Recent Value   AUA SYMPTOM SCORE    How often have you had a sensation of not emptying your bladder completely after you finished urinating? 2 (P)     How often have you had to urinate again less than two hours after you finished urinating? 1 (P)     How often have you found you stopped and started again several times when you urinate? 1 (P)     How often have you found it difficult to postpone urination? 0 (P)     How often have you had a weak urinary stream? 1 (P)     How often have you had to push or strain to begin urination? 0 (P)     How many times did you most typically get up to urinate from the time you went to bed at night until the time you got up in the morning? 4 (P)     Quality of Life: If you were to spend the rest of your life with your urinary condition just the way it is now, how would you feel about that? 4 (P)     AUA SYMPTOM SCORE 9 (P)                LABS:  Lab Results   Component Value Date    HGB 13.8 10/25/2024    HCT 45.4 10/25/2024    WBC 5.59 10/25/2024     10/25/2024   ]    Lab Results   Component Value Date    K 3.9 10/25/2024      10/25/2024    CO2 31 10/25/2024    BUN 15 10/25/2024    CREATININE 0.68 10/25/2024    CALCIUM 9.7 10/25/2024   ]        INPATIENT MEDS:    Current Outpatient Medications:     amLODIPine (NORVASC) 10 mg tablet, Take 1 tablet (10 mg total) by mouth daily, Disp: 90 tablet, Rfl: 3    Barberry-Oreg Grape-Goldenseal (BERBERINE COMPLEX PO), Take by mouth (Patient not taking: Reported on 6/20/2024), Disp: , Rfl:     calcium carbonate-vitamin D (OSCAL-D) 500 mg-200 units per tablet, Take 1 tablet by mouth daily, Disp: , Rfl:     Coenzyme Q10 (COQ10) 100 MG CAPS, Take by mouth, Disp: , Rfl:     Continuous Blood Gluc Sensor (FreeStyle Zev 2 Sensor) MISC, Check blood sugars multiple times per day, Disp: 6 each, Rfl: 3    Cyanocobalamin (B-12 PO), Take by mouth, Disp: , Rfl:     latanoprost (XALATAN) 0.005 % ophthalmic solution, INSTILL 1 DROP INTO BOTH EYES AT BEDTIME. FILL ON 01-09-18, Disp: , Rfl: 0    magnesium hydroxide (MILK OF MAGNESIA) 400 mg/5 mL oral suspension, Take 400 mL/kg by mouth daily as needed for constipation 400mg once a day, Disp: , Rfl:     MEGARED OMEGA-3 KRILL  MG CAPS, Take by mouth, Disp: , Rfl:     metFORMIN (GLUCOPHAGE) 850 mg tablet, TAKE 1 TABLET BY MOUTH DAILY WITH BREAKFAST., Disp: 90 tablet, Rfl: 1    multivitamin (THERAGRAN) TABS, Take 1 tablet by mouth daily, Disp: , Rfl:     NADH POWD, Take 250 mg by mouth, Disp: , Rfl:     NON FORMULARY, 2 (two) times a day CBD OIL, Disp: , Rfl:     Saw Palmetto, Serenoa repens, 450 MG CAPS, Take by mouth, Disp: , Rfl:     simvastatin (ZOCOR) 10 mg tablet, Take 1 tablet (10 mg total) by mouth daily at bedtime, Disp: 90 tablet, Rfl: 3    timolol (TIMOPTIC) 0.5 % ophthalmic solution, INSITLL 1 DROP INTO BOTH EYES EVERY MORNING, Disp: , Rfl:       Physical Exam:   There were no vitals taken for this visit.  GEN: no acute distress    RESP: breathing comfortably with no accessory muscle use    ABD: soft, non-tender, non-distended   INCISION:     EXT: no significant peripheral edema       RADIOLOGY:   IMPRESSION:     No significant interval change.     Persistent asymmetric nodular tracer activity involving the peripheral left prostate gland suspicious for intraprostatic PSMA positive malignancy.     Persistent subtle asymmetric low-level patchy tracer activity associated with asymmetric patchy sclerosis involving the right iliac bone suspicious for subtle persistent underlying PSMA positive osseous metastatic disease with differential diagnosis   including Paget's disease.     Assessment:   #1.  Metastatic prostate cancer    Plan:   -Lupron 45 mg given today  -Follow-up in 6 months with PSA prior to visit for his next injection  -  -

## 2024-10-31 ENCOUNTER — OFFICE VISIT (OUTPATIENT)
Dept: FAMILY MEDICINE CLINIC | Facility: CLINIC | Age: 74
End: 2024-10-31

## 2024-10-31 VITALS
HEIGHT: 70 IN | SYSTOLIC BLOOD PRESSURE: 124 MMHG | RESPIRATION RATE: 16 BRPM | WEIGHT: 201 LBS | OXYGEN SATURATION: 98 % | HEART RATE: 68 BPM | BODY MASS INDEX: 28.77 KG/M2 | DIASTOLIC BLOOD PRESSURE: 70 MMHG

## 2024-10-31 DIAGNOSIS — C61 PROSTATE CANCER (HCC): ICD-10-CM

## 2024-10-31 DIAGNOSIS — E11.9 TYPE 2 DIABETES MELLITUS WITHOUT COMPLICATION, WITHOUT LONG-TERM CURRENT USE OF INSULIN (HCC): Primary | ICD-10-CM

## 2024-10-31 DIAGNOSIS — I10 ESSENTIAL HYPERTENSION: ICD-10-CM

## 2024-10-31 DIAGNOSIS — M85.89 OTHER SPECIFIED DISORDERS OF BONE DENSITY AND STRUCTURE, MULTIPLE SITES: ICD-10-CM

## 2024-10-31 DIAGNOSIS — E78.2 MIXED HYPERLIPIDEMIA: ICD-10-CM

## 2024-10-31 NOTE — ASSESSMENT & PLAN NOTE
Well-controlled hypertension  Continue Norvasc 10 mg  Continue active lifestyle and healthy diet

## 2024-10-31 NOTE — ASSESSMENT & PLAN NOTE
Orders:    DXA bone density spine hip and pelvis; Future  PET scan findings showed sclerosing lesion on R iliac bone, DEXA scan ordered  for bone density.

## 2024-10-31 NOTE — ASSESSMENT & PLAN NOTE
Orders:    Albumin / creatinine urine ratio; Future    Comprehensive metabolic panel; Future    Lipid Panel with Direct LDL reflex; Future  Continue simvastatin 10 mg, coenzyme Q 100 mg daily and krill oil daily

## 2024-10-31 NOTE — ASSESSMENT & PLAN NOTE
Orders:    DXA bone density spine hip and pelvis; Future  Metastatic prostate cancer, suspected to right iliac bone.   F/u with urology for routine PSA checks and Lupron administration every 6 mo

## 2024-10-31 NOTE — PROGRESS NOTES
Ambulatory Visit  Name: Samuel Knight      : 1950      MRN: [id number]  Encounter Provider: Sharon Pavon MD  Encounter Date: 10/31/2024   Encounter department: [facility]    Assessment & Plan  Essential hypertension  Well-controlled hypertension  Continue Norvasc 10 mg  Continue active lifestyle and healthy diet       Type 2 diabetes mellitus without complication, without long-term current use of insulin (HCC)    Lab Results   Component Value Date    HGBA1C 6.6 (H) 10/25/2024       Orders:    Albumin / creatinine urine ratio; Future    Comprehensive metabolic panel; Future    Hemoglobin A1C; Future    Lipid Panel with Direct LDL reflex; Future  Diabetes well-controlled  Continue metformin 850 mg once daily, continue home glucose monitoring  Continue low-carb low sugar diet and active lifestyle  Mixed hyperlipidemia    Orders:    Albumin / creatinine urine ratio; Future    Comprehensive metabolic panel; Future    Lipid Panel with Direct LDL reflex; Future  Continue simvastatin 10 mg, coenzyme Q 100 mg daily and krill oil daily  Prostate cancer (HCC)    Orders:    DXA bone density spine hip and pelvis; Future  Metastatic prostate cancer, suspected to right iliac bone.   F/u with urology for routine PSA checks and Lupron administration every 6 mo    Other specified disorders of bone density and structure, multiple sites    Orders:    DXA bone density spine hip and pelvis; Future  PET scan findings showed sclerosing lesion on R iliac bone, DEXA scan ordered  for bone density.    Depression Screening and Follow-up Plan: Patient was screened for depression during today's encounter. They screened negative with a PHQ-2 score of 0.        History of Present Illness     Pleasant 74-year-old male with metastatic prostate cancer with suspected metastases to right iliac bone, hypertension, and type 2 diabetes mellitus without complication presents for 4-month follow-up on labs.  Patient is  compliant with all of his medications, and feels in generally good health today.  He is physically active, he lifts weights regularly.  Patient aware of suspected metastasis to his right iliac bone, and requests a DEXA scan for T-score and bone density analysis.  He is following regularly with urology team for regular PSA screenings and receives Lupron injections every 6 months. Patient declines any vaccinations today.       Review of Systems   Constitutional:  Negative for activity change, appetite change, chills, diaphoresis, fatigue and fever.   HENT: Negative.  Negative for dental problem, ear pain, hearing loss, rhinorrhea, sinus pressure, sinus pain, sore throat and tinnitus.    Eyes:  Negative for photophobia, pain, redness and visual disturbance.   Respiratory: Negative.  Negative for cough, chest tightness, shortness of breath, wheezing and stridor.    Cardiovascular:  Negative for chest pain, palpitations and leg swelling.   Gastrointestinal:  Negative for abdominal distention, abdominal pain, constipation, diarrhea, nausea and vomiting.   Genitourinary:  Negative for dysuria and hematuria.   Musculoskeletal:  Negative for arthralgias, back pain, joint swelling and myalgias.   Skin:  Negative for color change and rash.   Neurological:  Negative for seizures, syncope, weakness, light-headedness, numbness and headaches.   Hematological:  Negative for adenopathy.   All other systems reviewed and are negative.    Past Medical History:   Diagnosis Date    Cancer (HCC)     Colon polyp     Diabetes mellitus (HCC)     Hx of radiation therapy     Hypertension     Malignant neoplasm of prostate (HCC)     Shingles 02/22/2018     Past Surgical History:   Procedure Laterality Date    COLONOSCOPY      9 years ago    SD COLONOSCOPY FLX DX W/COLLJ SPEC WHEN PFRMD N/A 6/25/2018    Procedure: COLONOSCOPY;  Surgeon: Edwardo Cannon MD;  Location: [facility];  Service: Gastroenterology     Family History   Problem  Relation Age of Onset    Diabetes Mother     Hypertension Mother     Heart disease Father         cardiac disorder    Diabetes Father     Hypertension Father     Prostate cancer Father     Diabetes Sister     Hypertension Sister     Multiple myeloma Brother     Bone cancer Maternal Aunt      Social History     Tobacco Use    Smoking status: Former     Current packs/day: 0.00     Types: Cigarettes     Quit date:      Years since quittin.8    Smokeless tobacco: Never   Vaping Use    Vaping status: Never Used   Substance and Sexual Activity    Alcohol use: Not Currently     Alcohol/week: 2.0 standard drinks of alcohol     Types: 2 Cans of beer per week     Comment: socially    Drug use: No    Sexual activity: Not on file     Current Outpatient Medications on File Prior to Visit   Medication Sig    amLODIPine (NORVASC) 10 mg tablet Take 1 tablet (10 mg total) by mouth daily    Barberry-Oreg Grape-Goldenseal (BERBERINE COMPLEX PO) Take by mouth (Patient not taking: Reported on 2024)    calcium carbonate-vitamin D (OSCAL-D) 500 mg-200 units per tablet Take 1 tablet by mouth daily    Coenzyme Q10 (COQ10) 100 MG CAPS Take by mouth    Continuous Blood Gluc Sensor (FreeStyle Zev 2 Sensor) MISC Check blood sugars multiple times per day    Cyanocobalamin (B-12 PO) Take by mouth    latanoprost (XALATAN) 0.005 % ophthalmic solution INSTILL 1 DROP INTO BOTH EYES AT BEDTIME. FILL ON 18    MEGARED OMEGA-3 KRILL  MG CAPS Take by mouth    metFORMIN (GLUCOPHAGE) 850 mg tablet TAKE 1 TABLET BY MOUTH DAILY WITH BREAKFAST.    multivitamin (THERAGRAN) TABS Take 1 tablet by mouth daily    NADH POWD Take 250 mg by mouth    NON FORMULARY 2 (two) times a day CBD OIL    Saw Palmetto, Serenoa repens, 450 MG CAPS Take by mouth    simvastatin (ZOCOR) 10 mg tablet Take 1 tablet (10 mg total) by mouth daily at bedtime    timolol (TIMOPTIC) 0.5 % ophthalmic solution INSITLL 1 DROP INTO BOTH EYES EVERY MORNING     "[DISCONTINUED] magnesium hydroxide (MILK OF MAGNESIA) 400 mg/5 mL oral suspension Take 400 mL/kg by mouth daily as needed for constipation 400mg once a day     Allergies   Allergen Reactions    Lactate Diarrhea     Immunization History   Administered Date(s) Administered    COVID-19 PFIZER VACCINE 0.3 ML IM 03/19/2021, 04/12/2021, 10/20/2021    COVID-19 Pfizer Vac BIVALENT Sanya-sucrose 12 Yr+ IM 11/10/2022     Objective     /70   Pulse 68   Resp 16   Ht 5' 10\" (1.778 m)   Wt 91.2 kg (201 lb)   SpO2 98%   BMI 28.84 kg/m²     Physical Exam  Vitals and nursing note reviewed.   Constitutional:       General: Samuel is not in acute distress.     Appearance: Normal appearance. Samuel is normal weight. Samuel is not ill-appearing, toxic-appearing or diaphoretic.   HENT:      Head: Normocephalic and atraumatic.      Right Ear: Tympanic membrane, ear canal and external ear normal. There is no impacted cerumen.      Left Ear: Tympanic membrane, ear canal and external ear normal. There is no impacted cerumen.      Nose: Nose normal. No congestion or rhinorrhea.      Mouth/Throat:      Mouth: Mucous membranes are moist.      Pharynx: Oropharynx is clear. No oropharyngeal exudate or posterior oropharyngeal erythema.   Eyes:      General: No scleral icterus.        Right eye: No discharge.         Left eye: No discharge.      Extraocular Movements: Extraocular movements intact.      Conjunctiva/sclera: Conjunctivae normal.      Pupils: Pupils are equal, round, and reactive to light.   Cardiovascular:      Rate and Rhythm: Normal rate and regular rhythm.      Pulses: Normal pulses.      Heart sounds: Normal heart sounds. No murmur heard.     No friction rub. No gallop.   Pulmonary:      Effort: Pulmonary effort is normal. No respiratory distress.      Breath sounds: Normal breath sounds. No stridor. No wheezing, rhonchi or rales.   Abdominal:      General: Abdomen is flat. There is no distension.      Palpations: " Abdomen is soft. There is no mass.      Tenderness: There is no abdominal tenderness. There is no guarding.      Hernia: No hernia is present.   Musculoskeletal:         General: No swelling, tenderness, deformity or signs of injury. Normal range of motion.      Cervical back: Normal range of motion and neck supple. No tenderness.   Lymphadenopathy:      Cervical: No cervical adenopathy.   Skin:     General: Skin is warm and dry.      Capillary Refill: Capillary refill takes less than 2 seconds.      Coloration: Skin is not jaundiced or pale.      Findings: No rash.   Neurological:      General: No focal deficit present.      Mental Status: Samuel is alert and oriented to person, place, and time.      Motor: No weakness.   Psychiatric:         Mood and Affect: Mood normal.         Behavior: Behavior normal.

## 2024-10-31 NOTE — ASSESSMENT & PLAN NOTE
Lab Results   Component Value Date    HGBA1C 6.6 (H) 10/25/2024       Orders:    Albumin / creatinine urine ratio; Future    Comprehensive metabolic panel; Future    Hemoglobin A1C; Future    Lipid Panel with Direct LDL reflex; Future  Diabetes well-controlled  Continue metformin 850 mg once daily, continue home glucose monitoring  Continue low-carb low sugar diet and active lifestyle

## 2024-11-08 ENCOUNTER — HOSPITAL ENCOUNTER (OUTPATIENT)
Facility: HOSPITAL | Age: 74
Discharge: HOME | End: 2024-11-08
Payer: MEDICARE

## 2024-11-08 VITALS — HEIGHT: 69 IN | BODY MASS INDEX: 29.47 KG/M2 | WEIGHT: 199 LBS

## 2024-11-08 DIAGNOSIS — M85.89 OTHER SPECIFIED DISORDERS OF BONE DENSITY AND STRUCTURE, MULTIPLE SITES: ICD-10-CM

## 2024-11-08 DIAGNOSIS — C61 PROSTATE CANCER (HCC): ICD-10-CM

## 2024-11-08 PROCEDURE — 77080 DXA BONE DENSITY AXIAL: CPT

## 2024-12-13 ENCOUNTER — OFFICE VISIT (OUTPATIENT)
Dept: HEMATOLOGY ONCOLOGY | Facility: CLINIC | Age: 74
End: 2024-12-13
Payer: MEDICARE

## 2024-12-13 VITALS
OXYGEN SATURATION: 99 % | DIASTOLIC BLOOD PRESSURE: 74 MMHG | BODY MASS INDEX: 30.21 KG/M2 | RESPIRATION RATE: 16 BRPM | SYSTOLIC BLOOD PRESSURE: 122 MMHG | HEIGHT: 69 IN | HEART RATE: 62 BPM | WEIGHT: 204 LBS | TEMPERATURE: 98.1 F

## 2024-12-13 DIAGNOSIS — C79.51 NEOPLASM OF PROSTATE, DISTANT METASTASIS STAGING CATEGORY M1B: METASTASIS TO BONE (HCC): Primary | ICD-10-CM

## 2024-12-13 DIAGNOSIS — C61 PROSTATE CANCER (HCC): ICD-10-CM

## 2024-12-13 DIAGNOSIS — C61 NEOPLASM OF PROSTATE, DISTANT METASTASIS STAGING CATEGORY M1B: METASTASIS TO BONE (HCC): Primary | ICD-10-CM

## 2024-12-13 PROCEDURE — 99213 OFFICE O/P EST LOW 20 MIN: CPT | Performed by: INTERNAL MEDICINE

## 2024-12-13 NOTE — PROGRESS NOTES
Hematology/Oncology Outpatient Follow-up  Samuel Knight 74 y.o. adult 1950 [id number]    Date:  12/13/2024      Assessment and Plan:  Patient is 74-year-old male diagnosed with prostate cancer at age of 54 with [place] score of 6 s/p radiation therapy and intermittent Lupron, that was discontinued later on because of side effects. Recently PET scan noted with possible bone lesion, restarted on Lupron.     1. Neoplasm of prostate, 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 (HCC) (Primary)  2. Prostate cancer (HCC)  S/p RT and intermittent Lupron.  PMS he from November 2022 showed possible right hemipelvis bone disease.  PSA up trended to 5.6 in April 2024, liquid biopsy showed BRCA 1 mutation.   PET scan showed activity in the left aspect of prostate with possible bone lesion, He received Firmagon and later on Lupron since May 2024, last dose in October 2024.   PSA 1.9, follows with urology.  Will follow in 3 months with repeat PSA.      HPI:  74-year-old -American male with history of glaucoma, hypertension, diabetes mellitus type 2 who was diagnosed with prostate cancer in 2004 in [facility], according to the patient was [place] score of 5 status post radiation therapy and he received Lupron intermittently, I do not know the PSA at that time     He had been followed by urology group according to the patient he had metastatic disease in 2016 after rising PSA he was managed on androgen deprivation therapy with Lupron and elected to discontinue it due to the sexual side effect  PSMA scan on 11/2022 showed focal area of increased activity within the left posterior peripheral prostate zone with SUV of 4 concerning for residual or recurrent disease, no evidence of pelvic adenopathy, slight asymmetry in the pelvic bone more on the right than the left also was found on the CAT scan in 2017 monitor present stable metastatic in the right hemipelvis 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     Oncology  History   Neoplasm of prostate, 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 (HCC)   11/7/2017 Initial Diagnosis    Neoplasm of prostate, 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 (HCC)     5/1/2024 -  Cancer Staged    Staging form: Prostate, AJCC 7th Edition  - Clinical: Stage IV (M1b, [place] <= 6) - Signed by Angel Carpenter MD on 5/1/2024  Stage prefix: Recurrence  Biopsy of metastatic site performed: No  Lymph-vascular invasion (LVI): Presence of LVI unknown/indeterminate  Residual tumor (R): RX - Cannot be assessed  Prostate-specific antigen (PSA) level (ng/mL): 5.6  [place] score: 6  Clinical staging procedures performed: Imaging           Interval history: Patient is here for 6-month follow-up.  No symptoms reported.  Remains active and eats healthy.   ROS: Review of Systems   Constitutional:  Negative for fatigue and unexpected weight change.   Eyes:  Negative for discharge.   Cardiovascular:  Negative for chest pain and palpitations.   Gastrointestinal:  Negative for abdominal pain, blood in stool, nausea and vomiting.   Musculoskeletal:  Negative for back pain and myalgias.   All other systems reviewed and are negative.      Past Medical History:   Diagnosis Date    Cancer (HCC)     Colon polyp     Diabetes mellitus (HCC)     Hx of radiation therapy     Hypertension     Malignant neoplasm of prostate (HCC)     Shingles 02/22/2018       Past Surgical History:   Procedure Laterality Date    COLONOSCOPY      9 years ago    OR COLONOSCOPY FLX DX W/COLLJ SPEC WHEN PFRMD N/A 6/25/2018    Procedure: COLONOSCOPY;  Surgeon: Edwardo Cannon MD;  [facility];  Service: Gastroenterology       Social History     Socioeconomic History    Marital status: Single     Spouse name: Not on file    Number of children: Not on file    Years of education: Not on file    Highest education level: Not on file   Occupational History    Occupation: retired     Comment:     Tobacco Use    Smoking status: Former     Current packs/day: 0.00     Types: Cigarettes     Quit date:      Years since quittin.9    Smokeless tobacco: Never   Vaping Use    Vaping status: Never Used   Substance and Sexual Activity    Alcohol use: Not Currently     Alcohol/week: 2.0 standard drinks of alcohol     Types: 2 Cans of beer per week     Comment: socially    Drug use: No    Sexual activity: Not on file   Other Topics Concern    Not on file   Social History Narrative    Two children     Social Drivers of Health     Financial Resource Strain: Not on file   Food Insecurity: Not on file   Transportation Needs: Not on file   Physical Activity: Not on file   Stress: Not on file   Social Connections: Not on file   Intimate Partner Violence: Not on file   Housing Stability: Not on file       Family History   Problem Relation Age of Onset    Diabetes Mother     Hypertension Mother     Heart disease Father         cardiac disorder    Diabetes Father     Hypertension Father     Prostate cancer Father     Diabetes Sister     Hypertension Sister     Multiple myeloma Brother     Bone cancer Maternal Aunt        Allergies   Allergen Reactions    Lactate Diarrhea         Current Outpatient Medications:     amLODIPine (NORVASC) 10 mg tablet, Take 1 tablet (10 mg total) by mouth daily, Disp: 90 tablet, Rfl: 3    Barberry-Oreg Grape-Goldenseal (BERBERINE COMPLEX PO), Take by mouth (Patient not taking: Reported on 2024), Disp: , Rfl:     calcium carbonate-vitamin D (OSCAL-D) 500 mg-200 units per tablet, Take 1 tablet by mouth daily, Disp: , Rfl:     Coenzyme Q10 (COQ10) 100 MG CAPS, Take by mouth, Disp: , Rfl:     Continuous Blood Gluc Sensor (FreeStyle Zev 2 Sensor) MISC, Check blood sugars multiple times per day, Disp: 6 each, Rfl: 3    Cyanocobalamin (B-12 PO), Take by mouth, Disp: , Rfl:     latanoprost (XALATAN) 0.005 % ophthalmic solution, INSTILL 1 DROP INTO BOTH EYES AT BEDTIME. FILL ON 18,  "Disp: , Rfl: 0    MEGARED OMEGA-3 KRILL  MG CAPS, Take by mouth, Disp: , Rfl:     metFORMIN (GLUCOPHAGE) 850 mg tablet, TAKE 1 TABLET BY MOUTH DAILY WITH BREAKFAST., Disp: 90 tablet, Rfl: 1    multivitamin (THERAGRAN) TABS, Take 1 tablet by mouth daily, Disp: , Rfl:     NADH POWD, Take 250 mg by mouth, Disp: , Rfl:     NON FORMULARY, 2 (two) times a day CBD OIL, Disp: , Rfl:     Saw Palmetto, Serenoa repens, 450 MG CAPS, Take by mouth, Disp: , Rfl:     simvastatin (ZOCOR) 10 mg tablet, Take 1 tablet (10 mg total) by mouth daily at bedtime, Disp: 90 tablet, Rfl: 3    timolol (TIMOPTIC) 0.5 % ophthalmic solution, INSITLL 1 DROP INTO BOTH EYES EVERY MORNING, Disp: , Rfl:       Physical Exam:  /74 (BP Location: Left arm, Patient Position: Sitting, Cuff Size: Adult)   Pulse 62   Temp 98.1 °F (36.7 °C) (Temporal)   Resp 16   Ht 5' 8.5\" (1.74 m)   Wt 92.5 kg (204 lb)   SpO2 99%   BMI 30.57 kg/m²     Physical Exam  Constitutional:       Appearance: Normal appearance.   HENT:      Mouth/Throat:      Mouth: Mucous membranes are moist.      Pharynx: Oropharynx is clear.   Cardiovascular:      Rate and Rhythm: Normal rate and regular rhythm.      Pulses: Normal pulses.      Heart sounds: Normal heart sounds.   Pulmonary:      Effort: Pulmonary effort is normal.      Breath sounds: Normal breath sounds.   Abdominal:      General: Bowel sounds are normal.      Palpations: Abdomen is soft.      Tenderness: There is no abdominal tenderness.   Neurological:      Mental Status: Samuel is alert and oriented to person, place, and time. Mental status is at baseline.           Labs:    CBC and Platelet              Component  Ref Range & Units (hover) 10/25/24  7:58 AM 1/23/24  8:17 AM 8/28/23  8:17 AM 4/17/23  7:38 AM 11/18/22  8:23 AM 8/12/22 10:26 AM 4/15/22  7:07 AM   WBC 5.59 4.54 4.51 3.98 Low  4.28 Low  4.50 5.13   RBC 5.64 High  5.56 High  5.17 R 5.37 R 5.52 R 5.22 R 5.38 R   Hemoglobin 13.8 13.9 13.2 R 13.1 R " 13.6 R 12.7 R 13.2 R   Hematocrit 45.4 44.6 41.2 R 43.5 R 45.1 R 41.3 R 43.6 R   MCV 81 Low  80 Low  80 Low  81 Low  82 79 Low  81 Low    MCH 24.5 Low  25.0 Low  25.5 Low  24.4 Low  24.6 Low  24.3 Low  24.5 Low    MCHC 30.4 Low  31.2 Low  32.0 30.1 Low  30.2 Low  30.8 Low  30.3 Low    RDW 15.3 High  15.2 High  15.3 High  15.9 High  15.7 High  15.9 High  16.7 High    Platelets 217 212 217 225 245 224 233   MPV 11.9 11.2 10.9 11.2 11.0 11.2 10.9            Contains abnormal data Comprehensive metabolic panel            Component  Ref Range & Units (hover) 10/25/24  7:58 AM 6/14/24  7:14 AM 1/23/24  8:17 AM 8/28/23  8:17 AM 4/17/23  7:38 AM 11/18/22  8:23 AM 8/12/22 10:26 AM   Sodium 141 140 141 141 139 140 136   Potassium 3.9 4.4 4.6 4.0 4.1 4.4 3.5   Chloride 101 103 102 104 106 108 106   CO2 31 29 33 High  25 27 30 28   ANION GAP 9 8 6 R 12 R 6 2 Low  2 Low    BUN 15 11 18 19 12 14 13   Creatinine 0.68 0.70 CM 0.79 CM 0.74 CM 0.69 CM 0.78 CM 0.83 CM   Comment: Standardized to IDMS reference method   Glucose, Fasting 119 High  110 High  111 High  111 High  99  High   High  CM   Calcium 9.7 9.1 9.9 9.5 9.6 R 9.4 R 9.0 R   AST 23 19 19 24 R 27 CM 26 CM 28 CM   ALT 17 12 CM 13 CM 14 CM 20 R, CM 26 R, CM 26 R, CM   Comment: Specimen collection should occur prior to Sulfasalazine administration due to the potential for falsely depressed results.   Alkaline Phosphatase 104 95 117 High  100 140 High  R 113 R 106 R   Total Protein 7.5 6.9 7.7 7.2 7.6 8.0 7.7   Albumin 4.4 4.2 4.6 4.4 4.0 3.7 3.7   Total Bilirubin 0.74 0.71 CM 0.61 CM 0.50 CM 0.72 CM 0.45 CM 0.71 CM        PSA Total, Diagnostic  0 Result Notes            Component  Ref Range & Units (hover) 10/25/24  7:58 AM 8/8/24  7:29 AM 5/3/24  9:48 AM 4/5/24  8:54 AM 1/23/24  8:17 AM 10/5/23  8:03 AM 4/4/23  7:44 AM   PSA, Diagnostic 1.963 1.903 CM 2.66 R, CM 5.61 High  R, CM 1.63 R, CM 0.74 R, CM 3.4 R, CM   Comment: [employer] Access chemiluminescent  immunoassay. Confirm baseline values for patients being serially          Patient voiced understanding and agreement in the above discussion. Aware to contact our office with questions/symptoms in the interim.     This note has been generated by voice recognition software system.  Therefore, there may be spelling, grammar, and or syntax errors. Please contact if questions arise.

## 2025-02-27 ENCOUNTER — RA CDI HCC (OUTPATIENT)
Dept: OTHER | Facility: HOSPITAL | Age: 75
End: 2025-02-27

## 2025-02-27 DIAGNOSIS — I10 ESSENTIAL HYPERTENSION: ICD-10-CM

## 2025-02-27 DIAGNOSIS — E11.9 TYPE 2 DIABETES MELLITUS WITHOUT COMPLICATION, WITHOUT LONG-TERM CURRENT USE OF INSULIN (HCC): ICD-10-CM

## 2025-02-28 RX ORDER — AMLODIPINE BESYLATE 10 MG/1
10 TABLET ORAL DAILY
Qty: 90 TABLET | Refills: 3 | Status: SHIPPED | OUTPATIENT
Start: 2025-02-28

## 2025-03-11 ENCOUNTER — APPOINTMENT (OUTPATIENT)
Dept: LAB | Facility: AMBULARY SURGERY CENTER | Age: 75
End: 2025-03-11
Payer: MEDICARE

## 2025-03-11 DIAGNOSIS — C61 NEOPLASM OF PROSTATE, DISTANT METASTASIS STAGING CATEGORY M1B: METASTASIS TO BONE (HCC): ICD-10-CM

## 2025-03-11 DIAGNOSIS — C79.51 NEOPLASM OF PROSTATE, DISTANT METASTASIS STAGING CATEGORY M1B: METASTASIS TO BONE (HCC): ICD-10-CM

## 2025-03-11 LAB — PSA SERPL-MCNC: 1.97 NG/ML (ref 0–4)

## 2025-03-11 PROCEDURE — 84153 ASSAY OF PSA TOTAL: CPT

## 2025-03-11 PROCEDURE — 36415 COLL VENOUS BLD VENIPUNCTURE: CPT

## 2025-03-13 ENCOUNTER — OFFICE VISIT (OUTPATIENT)
Dept: HEMATOLOGY ONCOLOGY | Facility: CLINIC | Age: 75
End: 2025-03-13
Payer: MEDICARE

## 2025-03-13 VITALS
TEMPERATURE: 97.7 F | SYSTOLIC BLOOD PRESSURE: 146 MMHG | OXYGEN SATURATION: 97 % | HEIGHT: 68 IN | HEART RATE: 71 BPM | DIASTOLIC BLOOD PRESSURE: 82 MMHG | BODY MASS INDEX: 31.52 KG/M2 | RESPIRATION RATE: 16 BRPM | WEIGHT: 208 LBS

## 2025-03-13 DIAGNOSIS — C79.51 NEOPLASM OF PROSTATE, DISTANT METASTASIS STAGING CATEGORY M1B: METASTASIS TO BONE (HCC): Primary | ICD-10-CM

## 2025-03-13 DIAGNOSIS — C61 NEOPLASM OF PROSTATE, DISTANT METASTASIS STAGING CATEGORY M1B: METASTASIS TO BONE (HCC): Primary | ICD-10-CM

## 2025-03-13 PROCEDURE — 99214 OFFICE O/P EST MOD 30 MIN: CPT | Performed by: INTERNAL MEDICINE

## 2025-03-13 NOTE — ASSESSMENT & PLAN NOTE
74-year-old -American male who was diagnosed at age 54 with prostate cancer [place] score of 6 status post radiation therapy and intermittent Lupron Lupron was discontinued later on because of sexual side effects, the initial diagnosis was done in [facility]     I do not have any previous medical records on the patient however he had metastatic disease in 2016 by rising PSA he received Lupron every 6 months by urology     PSMA scan on 11/2022 showed possible right hemipelvis bony disease     PSA was 0.7 on 10/2003     However on 4/2024 went up to 5.6     Liquid biopsy showed BRCA1 mutation he will be candidate for PARP inhibitor as well as androgen inhibitor in the near future     PET scan showed activity in the left aspect of the prostate possible bony lesion he received Firmagon and later on Lupron every 6 months, PSA plateauing at 1.9    Will do PET scan, CBC, CMP, PSA in 3 months, any sign of progression we will add PARP inhibitor    Liquid biopsy showed BRCA1 mutation, patient to be seen by genetic counselor  Orders:    Ambulatory Referral to Oncology Genetics; Future    NM PET CT skull base to mid thigh; Future    CBC and differential; Future    Comprehensive metabolic panel; Future    PSA Total, Diagnostic; Future

## 2025-03-13 NOTE — PROGRESS NOTES
Name: Samuel Knight      : 1950      MRN: [id number]  Encounter Provider: Angel Carpenter MD  Encounter Date: 3/13/2025   Encounter department: [facility]  :  Assessment & Plan  Neoplasm of prostate, 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 (HCC)  74-year-old -American male who was diagnosed at age 54 with prostate cancer Kami score of 6 status post radiation therapy and intermittent Lupron Lupron was discontinued later on because of sexual side effects, the initial diagnosis was done in [facility]     I do not have any previous medical records on the patient however he had metastatic disease in 2016 by rising PSA he received Lupron every 6 months by urology     PSMA scan on 2022 showed possible right hemipelvis bony disease     PSA was 0.7 on 10/2003     However on 2024 went up to 5.6     Liquid biopsy showed BRCA1 mutation he will be candidate for PARP inhibitor as well as androgen inhibitor in the near future     PET scan showed activity in the left aspect of the prostate possible bony lesion he received Firmagon and later on Lupron every 6 months, PSA plateauing at 1.9    Will do PET scan, CBC, CMP, PSA in 3 months, any sign of progression we will add PARP inhibitor    Liquid biopsy showed BRCA1 mutation, patient to be seen by genetic counselor  Orders:    Ambulatory Referral to Oncology Genetics; Future    NM PET CT skull base to mid thigh; Future    CBC and differential; Future    Comprehensive metabolic panel; Future    PSA Total, Diagnostic; Future        Return in about 3 months (around 2025) for after imaging.    History of Present Illness   Chief Complaint   Patient presents with    Follow-up     Oncology History   Cancer Staging   Neoplasm of prostate, 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 (HCC)  Staging form: Prostate, AJCC 7th Edition  - Clinical: Stage IV (M1b, [place] <= 6) - Signed  "by Angel Carpenter MD on 5/1/2024  Stage prefix: Recurrence  Biopsy of metastatic site performed: No  Lymph-vascular invasion (LVI): Presence of LVI unknown/indeterminate  Residual tumor (R): RX - Cannot be assessed  Prostate-specific antigen (PSA) level (ng/mL): 5.6  [place] score: 6  Clinical staging procedures performed: Imaging  Oncology History   Neoplasm of prostate, 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 (HCC)   11/7/2017 Initial Diagnosis    Neoplasm of prostate, 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 (HCC)     5/1/2024 -  Cancer Staged    Staging form: Prostate, AJCC 7th Edition  - Clinical: Stage IV (M1b, [place] <= 6) - Signed by Angel Carpenter MD on 5/1/2024  Stage prefix: Recurrence  Biopsy of metastatic site performed: No  Lymph-vascular invasion (LVI): Presence of LVI unknown/indeterminate  Residual tumor (R): RX - Cannot be assessed  Prostate-specific antigen (PSA) level (ng/mL): 5.6  Kami score: 6  Clinical staging procedures performed: Imaging               Review of Systems        Objective   /82 (BP Location: Right arm, Patient Position: Sitting, Cuff Size: Adult)   Pulse 71   Temp 97.7 °F (36.5 °C) (Temporal)   Resp 16   Ht 5' 8\" (1.727 m)   Wt 94.3 kg (208 lb)   SpO2 97%   BMI 31.63 kg/m²     Pain Screening:  Pain Score:   2  ECOG   0  Physical Exam    Labs: I have reviewed the following labs:  Lab Results   Component Value Date/Time    WBC 5.59 10/25/2024 07:58 AM    RBC 5.64 (H) 10/25/2024 07:58 AM    Hemoglobin 13.8 10/25/2024 07:58 AM    Hematocrit 45.4 10/25/2024 07:58 AM    MCV 81 (L) 10/25/2024 07:58 AM    MCH 24.5 (L) 10/25/2024 07:58 AM    RDW 15.3 (H) 10/25/2024 07:58 AM    Platelets 217 10/25/2024 07:58 AM     Lab Results   Component Value Date/Time    Potassium 3.9 10/25/2024 07:58 AM    Chloride 101 10/25/2024 07:58 AM    CO2 31 10/25/2024 07:58 AM    BUN 15 10/25/2024 07:58 AM    Creatinine 0.68 10/25/2024 07:58 AM    " Glucose, Fasting 119 (H) 10/25/2024 07:58 AM    Calcium 9.7 10/25/2024 07:58 AM    AST 23 10/25/2024 07:58 AM    ALT 17 10/25/2024 07:58 AM    Alkaline Phosphatase 104 10/25/2024 07:58 AM    Total Protein 7.5 10/25/2024 07:58 AM    Albumin 4.4 10/25/2024 07:58 AM    Total Bilirubin 0.74 10/25/2024 07:58 AM

## 2025-03-19 ENCOUNTER — OFFICE VISIT (OUTPATIENT)
Dept: GENETICS | Facility: CLINIC | Age: 75
End: 2025-03-19

## 2025-03-19 DIAGNOSIS — C61 NEOPLASM OF PROSTATE, DISTANT METASTASIS STAGING CATEGORY M1B: METASTASIS TO BONE (HCC): Primary | ICD-10-CM

## 2025-03-19 DIAGNOSIS — Z80.42 FAMILY HISTORY OF PROSTATE CANCER: ICD-10-CM

## 2025-03-19 DIAGNOSIS — C79.51 NEOPLASM OF PROSTATE, DISTANT METASTASIS STAGING CATEGORY M1B: METASTASIS TO BONE (HCC): Primary | ICD-10-CM

## 2025-03-19 PROCEDURE — PBNCHG PB NO CHARGE PLACEHOLDER: Performed by: GENETIC COUNSELOR, MS

## 2025-03-19 NOTE — PROGRESS NOTES
I introduced myself to Samuel and let him know that his oncologist Dr. Carpenter referred him to the Cancer Risk and Genetics program given his metastatic prostate cancer diagnosis.     I reviewed the following with Samuel:    While the majority of cancer occurs by chance, approximately 5-15% of prostate cancer has an underlying genetic cause (PMID: 30630926).  Genetic testing is available which can determine if there is an underlying genetic cause to your cancer.  Understanding if there is an underlying genetic cause can:  Provide your physician with additional information to help determine the most appropriate treatment decisions and eligibility for clinical trials.    Determine if you have an increased risk for any additional cancers.  Help family members understand their cancer risk.       We are reaching out to see if you have interest in genetic testing.  This test is not a requirement but can take 21 days to complete, so we would like to start the process as soon as possible.      If you are interested in genetic testing, I can collect your family history and initiate genetic testing for you.        Patient elected to pursue testing     Personal History:  Oncology History   Neoplasm of prostate, 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 (HCC)   11/7/2017 Initial Diagnosis    Neoplasm of prostate, 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 (HCC)     5/1/2024 -  Cancer Staged    Staging form: Prostate, AJCC 7th Edition  - Clinical: Stage IV (M1b, [place] <= 6) - Signed by Angel Carpenter MD on 5/1/2024  Stage prefix: Recurrence  Biopsy of metastatic site performed: No  Lymph-vascular invasion (LVI): Presence of LVI unknown/indeterminate  Residual tumor (R): RX - Cannot be assessed  Prostate-specific antigen (PSA) level (ng/mL): 5.6  [place] score: 6  Clinical staging procedures performed: Imaging             Family History:  Do you have Ashkenazi Yazidism ancestry? No  If yes,  maternal, paternal, or both?    Do you have any children? Yes  How many sons? 0  How many daughters? 1, 44  Do any of your children have a history of cancer? No  If yes type/age of diagnosis:     Do you have any brothers or sisters? Yes  How many brothers? 2 (one sister  at 54; one sister living at 72)  How many sisters? 1  Are they from the same parents? Yes  If no how maternal/paternal half-siblings:  Do any of your brothers or sisters have a history of cancer? Yes  If yes who and the type/age of diagnosis: brother (d.38) history of multiple myeloma          Do you have nieces or nephews? Yes  Do any of them have a history of cancer? No  If yes type/age of diagnosis:    Does your mother have a history of cancer? No  If yes, cancer type and age of diagnosis:   Is your mother still living? No  Age/Age of death: 93    Thinking about your mother's family (aunts, uncles, cousins, grandparents) is there anyone with a history of cancer? Yes  If yes, list relationship, cancer type and age of diagnosis:  Aunt with history of leukemia    Does your father have a history of cancer? Yes, prostate cancer  If yes, cancer type and age of diagnosis:  Is your father still living? No  Age/age of death: 85    Thinking about your father's family (aunts, uncles, cousins, grandparents) is there anyone with a history of cancer? No  If yes, list relationship, cancer type and age of diagnosis:      Types of Results - Positive, Negative, VUS  Positive Result - May explain personal diagnosis/family history. Can provide your physicians information for the most appropriate treatment plan and provide insight about additional cancer risks to inform future screening/management. Positive results can initiate testing for other family members who may be at risk (children, siblings, etc)  Negative Result - Does not give an explanation. Treatment plan will be based on clinical presentation. Negative result cannot be passed down to children,  but they are still at elevated risk.  Uncertain Result - Common, but treated like a negative result clinically. 90% are downgraded over time.     [facility] billing policy   Based on Samuel's primary insurance being Medicare and [facility]'s billing policy, Samuel should expect an out of pocket cost of $0. Samuel verbalized understanding.    Plan:  Meets NCCN V2.2025 Testing Criteria for Prostate Cancer Susceptibility Genes: personal history of metastatic prostate cancer    Patient decided to proceed with testing and provided consent.  An order was placed and the patient was instructed to go to a [facility] lab for a blood collection    Genetic Testing Preformed: [place] + RNA (39 genes): APC, ANDREY, AXIN2, BAP1, BARD1, BMPR1A, BRCA1, BRCA2, BRIP1, CDH1, CDKN2A, CHEK2, EPCAM, FH, FLCN, GREM1, HOXB13, MBD4, MET, MLH1, MSH2, MSH3, MSH6, MUTYH, NF1, NTHL1, PALB2, PMS2, POLD1, POLE, PTEN, RAD51C, RAD51D, SMAD4, STK11, TSC1, TSC2, TP53, VHL    Results will be delivered over [employer].  It may take up to 2-3 weeks to complete once test is started.    In the event that we need to contact the patient:   I confirmed the patient's mobile number on file as the best number to call with results  I confirmed with the patient that we can leave a voicemail on the provided numbers

## 2025-03-31 ENCOUNTER — APPOINTMENT (OUTPATIENT)
Dept: LAB | Facility: AMBULARY SURGERY CENTER | Age: 75
End: 2025-03-31
Payer: MEDICARE

## 2025-03-31 DIAGNOSIS — E78.2 MIXED HYPERLIPIDEMIA: ICD-10-CM

## 2025-03-31 DIAGNOSIS — Z80.42 FAMILY HISTORY OF PROSTATE CANCER: ICD-10-CM

## 2025-03-31 DIAGNOSIS — C79.51 NEOPLASM OF PROSTATE, DISTANT METASTASIS STAGING CATEGORY M1B: METASTASIS TO BONE (HCC): ICD-10-CM

## 2025-03-31 DIAGNOSIS — C61 NEOPLASM OF PROSTATE, DISTANT METASTASIS STAGING CATEGORY M1B: METASTASIS TO BONE (HCC): ICD-10-CM

## 2025-03-31 DIAGNOSIS — E11.9 TYPE 2 DIABETES MELLITUS WITHOUT COMPLICATION, WITHOUT LONG-TERM CURRENT USE OF INSULIN (HCC): ICD-10-CM

## 2025-03-31 LAB
ALBUMIN SERPL BCG-MCNC: 4.6 G/DL (ref 3.5–5)
ALP SERPL-CCNC: 119 U/L (ref 34–104)
ALT SERPL W P-5'-P-CCNC: 13 U/L (ref 7–52)
ANION GAP SERPL CALCULATED.3IONS-SCNC: 10 MMOL/L (ref 4–13)
AST SERPL W P-5'-P-CCNC: 28 U/L (ref 5–45)
BILIRUB SERPL-MCNC: 0.63 MG/DL (ref 0.2–1)
BUN SERPL-MCNC: 15 MG/DL (ref 5–25)
CALCIUM SERPL-MCNC: 9.6 MG/DL (ref 8.4–10.2)
CHLORIDE SERPL-SCNC: 104 MMOL/L (ref 96–108)
CHOLEST SERPL-MCNC: 190 MG/DL (ref ?–200)
CO2 SERPL-SCNC: 27 MMOL/L (ref 21–32)
CREAT SERPL-MCNC: 0.71 MG/DL (ref 0.6–1.3)
CREAT UR-MCNC: 52.5 MG/DL
EST. AVERAGE GLUCOSE BLD GHB EST-MCNC: 163 MG/DL
GLUCOSE P FAST SERPL-MCNC: 120 MG/DL (ref 65–99)
HBA1C MFR BLD: 7.3 %
HDLC SERPL-MCNC: 87 MG/DL
LDLC SERPL CALC-MCNC: 87 MG/DL (ref 0–100)
MICROALBUMIN UR-MCNC: 7 MG/L
MICROALBUMIN/CREAT 24H UR: 13 MG/G CREATININE (ref 0–30)
POTASSIUM SERPL-SCNC: 3.9 MMOL/L (ref 3.5–5.3)
PROT SERPL-MCNC: 7.4 G/DL (ref 6.4–8.4)
SODIUM SERPL-SCNC: 141 MMOL/L (ref 135–147)
TRIGL SERPL-MCNC: 81 MG/DL (ref ?–150)

## 2025-03-31 PROCEDURE — 82043 UR ALBUMIN QUANTITATIVE: CPT

## 2025-03-31 PROCEDURE — 80061 LIPID PANEL: CPT

## 2025-03-31 PROCEDURE — 82570 ASSAY OF URINE CREATININE: CPT

## 2025-03-31 PROCEDURE — 80053 COMPREHEN METABOLIC PANEL: CPT

## 2025-03-31 PROCEDURE — 36415 COLL VENOUS BLD VENIPUNCTURE: CPT

## 2025-03-31 PROCEDURE — 83036 HEMOGLOBIN GLYCOSYLATED A1C: CPT

## 2025-04-01 ENCOUNTER — RESULTS FOLLOW-UP (OUTPATIENT)
Dept: FAMILY MEDICINE CLINIC | Facility: CLINIC | Age: 75
End: 2025-04-01

## 2025-04-02 ENCOUNTER — OFFICE VISIT (OUTPATIENT)
Dept: FAMILY MEDICINE CLINIC | Facility: CLINIC | Age: 75
End: 2025-04-02
Payer: MEDICARE

## 2025-04-02 VITALS
DIASTOLIC BLOOD PRESSURE: 70 MMHG | HEART RATE: 70 BPM | BODY MASS INDEX: 31.52 KG/M2 | OXYGEN SATURATION: 98 % | RESPIRATION RATE: 16 BRPM | WEIGHT: 208 LBS | SYSTOLIC BLOOD PRESSURE: 132 MMHG | HEIGHT: 68 IN

## 2025-04-02 DIAGNOSIS — E11.9 TYPE 2 DIABETES MELLITUS WITHOUT COMPLICATION, WITHOUT LONG-TERM CURRENT USE OF INSULIN (HCC): Primary | ICD-10-CM

## 2025-04-02 DIAGNOSIS — E78.2 MIXED HYPERLIPIDEMIA: ICD-10-CM

## 2025-04-02 PROCEDURE — 99214 OFFICE O/P EST MOD 30 MIN: CPT | Performed by: FAMILY MEDICINE

## 2025-04-02 PROCEDURE — G2211 COMPLEX E/M VISIT ADD ON: HCPCS | Performed by: FAMILY MEDICINE

## 2025-04-02 RX ORDER — SIMVASTATIN 10 MG
10 TABLET ORAL
Qty: 90 TABLET | Refills: 3 | Status: SHIPPED | OUTPATIENT
Start: 2025-04-02

## 2025-04-02 NOTE — PATIENT INSTRUCTIONS
"Patient Education     Type 2 diabetes   The Basics   Written by the doctors and editors at [facility]   What is type 2 diabetes? -- This is a disorder that disrupts the way the body uses sugar. It is sometimes called type 2 diabetes mellitus.  All of the cells in the body need sugar to work normally. Sugar gets into the cells with the help of a hormone called insulin. Insulin is made by the pancreas, an organ in the belly. If there is not enough insulin, or if cells in the body don't respond normally to insulin, sugar builds up in the blood. That is what happens to people with diabetes.  There are 2 different types of diabetes:   In type 1 diabetes, the pancreas makes little or no insulin.   In type 2 diabetes, the pancreas still makes some insulin, but the cells in the body stop responding normally. Eventually, the pancreas cannot make enough insulin to keep up.  Having excess body weight or obesity increases a person's risk of developing type 2 diabetes. But people without excess body weight can get diabetes, too.  What are the symptoms of type 2 diabetes? -- Type 2 diabetes usually causes no symptoms. When symptoms do happen, they include:   Needing to urinate often   Intense thirst   Blurry vision  Can diabetes lead to other health problems? -- Yes. Type 2 diabetes might not make you feel sick. But if it is not managed, it can lead to serious problems over time, such as:   Heart attacks   Strokes   Kidney disease   Vision problems (or even blindness)   Pain or loss of feeling in the hands and feet   Needing to have fingers, toes, or other body parts removed (amputated)  How do I know if I have type 2 diabetes? -- Your doctor or nurse can do a blood test. There are 2 tests that can be used for this. Both involve measuring the amount of sugar in your blood, called your \"blood sugar\" or \"blood glucose\":   One of the tests measures your blood sugar at the time the blood sample is taken. This test is done in the " "morning. You can't eat or drink anything except water for at least 8 hours before the test.   The other test shows what your average blood sugar has been for the past 2 to 3 months. This blood test is called \"hemoglobin A1C\" or just \"A1C.\" It can be checked at any time of the day, even if you have recently eaten.  How is type 2 diabetes treated? -- The goals of treatment are to manage your blood sugar and lower the risk of future problems that can happen in people with diabetes.  Treatment might include:   Lifestyle changes - This is an important part of managing diabetes. It includes eating healthy foods and getting plenty of physical activity.   Medicines - There are a few medicines that help lower blood sugar. Some people need to take pills that help the body make more insulin or that help insulin do its job. Others need insulin shots.  Depending on what medicines you take, you might need to check your blood sugar regularly at home. But not everyone with type 2 diabetes needs to do this. Your doctor or nurse will tell you if you should be checking your blood sugar, and when and how to do this.  Sometimes, people with type 2 diabetes also need medicines to help prevent problems caused by the disease. For instance, medicines used to lower blood pressure can reduce the chances of a heart attack or stroke.   General medical care - It's also important to take care of other areas of your health. This includes watching your blood pressure and cholesterol levels. You should also get certain vaccines, such as vaccines to protect against the flu and coronavirus disease 2019 (\"COVID-19\"). Some people also need a vaccine to prevent pneumonia.  Can type 2 diabetes be prevented? -- Yes. To lower your chances of getting type 2 diabetes, the most important thing you can do is eat a healthy diet and get plenty of physical activity. This can help you lose weight if you are overweight. But eating well and being active are also good " for your overall health. Even gentle activity, like walking, has benefits.  If you smoke, quitting can also lower your risk of type 2 diabetes. Quitting smoking can be difficult, but your doctor or nurse can help.  All topics are updated as new evidence becomes available and our peer review process is complete.  This topic retrieved from [employer] on: Apr 24, 2024.  Topic 83363 Version 23.0  Release: 32.3.2 - C32.113  © 2024 UpToDate, Inc. and/or its affiliates. All rights reserved.  Consumer Information Use and Disclaimer   Disclaimer: This generalized information is a limited summary of diagnosis, treatment, and/or medication information. It is not meant to be comprehensive and should be used as a tool to help the user understand and/or assess potential diagnostic and treatment options. It does NOT include all information about conditions, treatments, medications, side effects, or risks that may apply to a specific patient. It is not intended to be medical advice or a substitute for the medical advice, diagnosis, or treatment of a health care provider based on the health care provider's examination and assessment of a patient's specific and unique circumstances. Patients must speak with a health care provider for complete information about their health, medical questions, and treatment options, including any risks or benefits regarding use of medications. This information does not endorse any treatments or medications as safe, effective, or approved for treating a specific patient. UpToDate, Inc. and its affiliates disclaim any warranty or liability relating to this information or the use thereof.The use of this information is governed by the Terms of Use, available at https://www.wolterskluwer.com/en/know/clinical-effectiveness-terms. 2024© UpToDate, Inc. and its affiliates and/or licensors. All rights reserved.  Copyright   © 2024 UpToDate, Inc. and/or its affiliates. All rights reserved.

## 2025-04-02 NOTE — ASSESSMENT & PLAN NOTE
Diallohl trend is increasing continue simvastatin 10 mg but he needs to improve his diet  Orders:  •  simvastatin (ZOCOR) 10 mg tablet; Take 1 tablet (10 mg total) by mouth daily at bedtime

## 2025-04-02 NOTE — PROGRESS NOTES
Name: Samuel Knight      : 1950      MRN: [id number]  Encounter Provider: Sharon Pavon MD  Encounter Date: 2025   Encounter department: [facility]  :  Assessment & Plan  Type 2 diabetes mellitus without complication, without long-term current use of insulin (HCC)    Lab Results   Component Value Date    HGBA1C 7.3 (H) 2025   Diabetes is getting worse as he did not tolerate the metformin it is giving him diarrhea, will stop that and start him on Ozempic injection, if that medicine suits him we can increase the dose slowly, side effect of medication discussed with him, he is interested in weight loss    Orders:  •  Diabetic foot exam; Future  •  semaglutide, 0.25 or 0.5 mg/dose, (Ozempic, 0.25 or 0.5 MG/DOSE,) 2 mg/3 mL injection pen; Inject 0.25 mg under the skin once weekly for 28 days, THEN inject 0.5 mg under the skin once weekly  •  Comprehensive metabolic panel; Future  •  Hemoglobin A1C; Future  •  Lipid Panel with Direct LDL reflex; Future    Mixed hyperlipidemia  Strahl trend is increasing continue simvastatin 10 mg but he needs to improve his diet  Orders:  •  simvastatin (ZOCOR) 10 mg tablet; Take 1 tablet (10 mg total) by mouth daily at bedtime    Follow-up in 3 months after the next lab       History of Present Illness   Follow-up on diabetes, he says he is not taking metformin because that is giving him significant diarrhea, he is on 850 mg metformin and he wants to switch the medication  He has prostate cancer which is stable and has been seeing urologist on Lupron injection every 6-month, overall feels well, taking simvastatin and needs refill  Blood pressure has been stable on amlodipine  He follows eye doctor regularly and wears glasses  He has callus on his right foot but does not see any podiatrist      Review of Systems   Constitutional:  Negative for activity change, appetite change, chills, fatigue, fever and unexpected weight change.   HENT:  Negative  "for congestion, ear discharge, ear pain, nosebleeds, postnasal drip, rhinorrhea, sinus pressure, sneezing, sore throat, trouble swallowing and voice change.    Eyes:  Negative for photophobia, pain, discharge, redness and itching.   Respiratory:  Negative for cough, chest tightness, shortness of breath and wheezing.    Cardiovascular:  Negative for chest pain, palpitations and leg swelling.   Gastrointestinal:  Negative for abdominal pain, constipation, diarrhea, nausea and vomiting.   Endocrine: Negative for polyuria.   Genitourinary:  Negative for dysuria, frequency and urgency.   Musculoskeletal:  Negative for arthralgias, back pain, myalgias and neck pain.   Skin:  Negative for color change, pallor and rash.   Allergic/Immunologic: Negative for environmental allergies and food allergies.   Neurological:  Negative for dizziness, weakness, light-headedness and headaches.   Hematological:  Negative for adenopathy. Does not bruise/bleed easily.   Psychiatric/Behavioral:  Negative for behavioral problems. The patient is not nervous/anxious.        Objective   /70   Pulse 70   Resp 16   Ht 5' 8\" (1.727 m)   Wt 94.3 kg (208 lb)   SpO2 98%   BMI 31.63 kg/m²      Physical Exam  Vitals and nursing note reviewed.   Constitutional:       General: Samuel is not in acute distress.     Appearance: Normal appearance.   HENT:      Head: Normocephalic and atraumatic.      Nose: No rhinorrhea.      Mouth/Throat:      Mouth: Mucous membranes are moist.      Pharynx: No oropharyngeal exudate or posterior oropharyngeal erythema.   Eyes:      Extraocular Movements: Extraocular movements intact.      Conjunctiva/sclera: Conjunctivae normal.   Cardiovascular:      Rate and Rhythm: Normal rate and regular rhythm.      Pulses: no weak pulses.           Dorsalis pedis pulses are 2+ on the right side and 2+ on the left side.      Heart sounds: No murmur heard.  Pulmonary:      Effort: Pulmonary effort is normal.      Breath " sounds: Normal breath sounds. No wheezing or rales.   Abdominal:      General: There is no distension.      Palpations: Abdomen is soft. There is no mass.      Tenderness: There is no abdominal tenderness. There is no guarding.   Musculoskeletal:         General: No swelling, tenderness, deformity or signs of injury. Normal range of motion.      Cervical back: Normal range of motion and neck supple.   Feet:      Right foot:      Skin integrity: Callus present. No ulcer, skin breakdown, erythema, warmth or dry skin.      Left foot:      Skin integrity: No ulcer, skin breakdown, erythema, warmth, callus or dry skin.   Skin:     Coloration: Skin is not jaundiced or pale.      Findings: No rash.   Neurological:      General: No focal deficit present.      Mental Status: Samuel is alert and oriented to person, place, and time.      Motor: No weakness.   Psychiatric:         Mood and Affect: Mood normal.     Diabetic Foot Exam    Patient's shoes and socks removed.    Right Foot/Ankle   Right Foot Inspection  Skin Exam: skin normal, callus and callus. Skin not intact, no dry skin, no warmth, no erythema, no maceration, no abnormal color, no pre-ulcer and no ulcer.     Toe Exam: ROM and strength within normal limits.     Sensory   Vibration: intact  Proprioception: intact  Monofilament testing: intact    Vascular  Capillary refills: < 3 seconds  The right DP pulse is 2+.     Left Foot/Ankle  Left Foot Inspection  Skin Exam: skin normal. Skin not intact, no dry skin, no warmth, no erythema, no maceration, normal color, no pre-ulcer, no ulcer and no callus.     Toe Exam: ROM and strength within normal limits.     Sensory   Vibration: intact  Proprioception: intact  Monofilament testing: intact    Vascular  Capillary refills: < 3 seconds  The left DP pulse is 2+.     Assign Risk Category  No deformity present  No loss of protective sensation  No weak pulses  Risk: 0

## 2025-04-02 NOTE — ASSESSMENT & PLAN NOTE
Lab Results   Component Value Date    HGBA1C 7.3 (H) 03/31/2025   Diabetes is getting worse as he did not tolerate the metformin it is giving him diarrhea, will stop that and start him on Ozempic injection, if that medicine suits him we can increase the dose slowly, side effect of medication discussed with him, he is interested in weight loss    Orders:  •  Diabetic foot exam; Future  •  semaglutide, 0.25 or 0.5 mg/dose, (Ozempic, 0.25 or 0.5 MG/DOSE,) 2 mg/3 mL injection pen; Inject 0.25 mg under the skin once weekly for 28 days, THEN inject 0.5 mg under the skin once weekly  •  Comprehensive metabolic panel; Future  •  Hemoglobin A1C; Future  •  Lipid Panel with Direct LDL reflex; Future

## 2025-04-03 ENCOUNTER — TELEPHONE (OUTPATIENT)
Dept: FAMILY MEDICINE CLINIC | Facility: CLINIC | Age: 75
End: 2025-04-03

## 2025-04-03 NOTE — TELEPHONE ENCOUNTER
"PA has been started for the patient by the pharmacy for Ozempic (0.25 or 0.5 mg/dose)  2mg/3ml Pen-injectors.    Login to [email or web address] and click \"enter a key\".    Enter the patient's last name, date of birth and the key.    KEY: [id number]    Complete the PA and click \"send to plan\" for approval.      PA request scanned in.  "

## 2025-04-04 ENCOUNTER — TELEPHONE (OUTPATIENT)
Dept: FAMILY MEDICINE CLINIC | Facility: CLINIC | Age: 75
End: 2025-04-04

## 2025-04-04 ENCOUNTER — OFFICE VISIT (OUTPATIENT)
Dept: URGENT CARE | Facility: CLINIC | Age: 75
End: 2025-04-04
Payer: MEDICARE

## 2025-04-04 VITALS
HEIGHT: 70 IN | DIASTOLIC BLOOD PRESSURE: 74 MMHG | WEIGHT: 205 LBS | BODY MASS INDEX: 29.35 KG/M2 | HEART RATE: 79 BPM | SYSTOLIC BLOOD PRESSURE: 146 MMHG | OXYGEN SATURATION: 99 % | TEMPERATURE: 97.2 F | RESPIRATION RATE: 16 BRPM

## 2025-04-04 DIAGNOSIS — R31.9 HEMATURIA, UNSPECIFIED TYPE: ICD-10-CM

## 2025-04-04 DIAGNOSIS — R39.9 UTI SYMPTOMS: Primary | ICD-10-CM

## 2025-04-04 LAB
SL AMB  POCT GLUCOSE, UA: ABNORMAL
SL AMB LEUKOCYTE ESTERASE,UA: ABNORMAL
SL AMB POCT BILIRUBIN,UA: ABNORMAL
SL AMB POCT BLOOD,UA: ABNORMAL
SL AMB POCT CLARITY,UA: ABNORMAL
SL AMB POCT COLOR,UA: ABNORMAL
SL AMB POCT KETONES,UA: ABNORMAL
SL AMB POCT NITRITE,UA: ABNORMAL
SL AMB POCT PH,UA: 6
SL AMB POCT SPECIFIC GRAVITY,UA: 1.01
SL AMB POCT URINE PROTEIN: 300
SL AMB POCT UROBILINOGEN: 0.2

## 2025-04-04 PROCEDURE — 87077 CULTURE AEROBIC IDENTIFY: CPT | Performed by: NURSE PRACTITIONER

## 2025-04-04 PROCEDURE — G0463 HOSPITAL OUTPT CLINIC VISIT: HCPCS | Performed by: NURSE PRACTITIONER

## 2025-04-04 PROCEDURE — 81002 URINALYSIS NONAUTO W/O SCOPE: CPT | Performed by: NURSE PRACTITIONER

## 2025-04-04 PROCEDURE — 87086 URINE CULTURE/COLONY COUNT: CPT | Performed by: NURSE PRACTITIONER

## 2025-04-04 PROCEDURE — 87186 SC STD MICRODIL/AGAR DIL: CPT | Performed by: NURSE PRACTITIONER

## 2025-04-04 PROCEDURE — 99213 OFFICE O/P EST LOW 20 MIN: CPT | Performed by: NURSE PRACTITIONER

## 2025-04-04 RX ORDER — SULFAMETHOXAZOLE AND TRIMETHOPRIM 800; 160 MG/1; MG/1
1 TABLET ORAL EVERY 12 HOURS SCHEDULED
Qty: 14 TABLET | Refills: 0 | Status: SHIPPED | OUTPATIENT
Start: 2025-04-04 | End: 2025-04-11

## 2025-04-04 NOTE — PATIENT INSTRUCTIONS
--Symptoms and urine dip suggestive of UTI (bladder infection).  Will treat presumptively for now while awaiting urine culture results (anticipate 48-72 hours). Will contact you with results if changes need to be made to the care plan discussed with you at the visit.    --Frequent fluids  --Take antibiotic as prescribed  --Seek re-evaluation with urologist if no improvement/worsening over the next 24-48 hours with these measures.  ER if necessary.     abnormal lab result

## 2025-04-04 NOTE — TELEPHONE ENCOUNTER
Assumed care of pt from ARISTIDES Bang pt awake in bed A&O x 4 , no distress noted and complains of foot pain. Frequently used items and call bell within reach. Patient verbalized understanding to use call bell for any needs or assistance. Bed locked in lowest position. PA for ozempic 0.25 mg /0.5 ml SUBMITTED to [facility]     via    []CMM-KEY:   [x]Surescripts-Case ID # [id number]   []Availity-Auth ID # NDC #   []Faxed to plan   []Other website   []Phone call Case ID #     []PA sent as URGENT    All office notes, labs and other pertaining documents and studies sent. Clinical questions answered. Awaiting determination from insurance company.     Turnaround time for your insurance to make a decision on your Prior Authorization can take 7-21 business days.

## 2025-04-04 NOTE — TELEPHONE ENCOUNTER
Pt stated that Ozempic is not covered through his insurance. He is asking if there is something else that can be prescribed that might be covered

## 2025-04-04 NOTE — PROGRESS NOTES
[facility]        NAME: Samuel Knight is a 75 y.o. adult  : 1950    MRN: [id number]  DATE: 2025  TIME: 5:07 PM    Assessment and Plan   UTI symptoms [R39.9]  1. Hematuria, unspecified type  POCT urine dip    Urine culture   2. UTI symptoms  sulfamethoxazole-trimethoprim (BACTRIM DS) 800-160 mg per tablet            Patient Instructions     Patient Instructions   --Symptoms and urine dip suggestive of UTI (bladder infection).  Will treat presumptively for now while awaiting urine culture results (anticipate 48-72 hours). Will contact you with results if changes need to be made to the care plan discussed with you at the visit.    --Frequent fluids  --Take antibiotic as prescribed  --Seek re-evaluation with urologist if no improvement/worsening over the next 24-48 hours with these measures.  ER if necessary.       If tests have been performed at [facility], our office will contact you with results if changes need to be made to the care plan discussed with you at the visit.  You can review your full results on [facility] MyChart.    Chief Complaint     Chief Complaint   Patient presents with    Possible UTI     Painful symptoms with hematuria.          History of Present Illness       Here with complaints of dysuria, frequency, urgency, pink colored urine since this morning.    Some sensation of incomplete void.   No abdominal/bladder pain, back pain, testicular pain, fever/chills, N/V, urethral discharge.    Notes hx prostate cancer currently treated with Leupron only.  Denies prostatectomy, current radiation.    Denies prior hx of UTI's.    Rides exercise bike regularly, but no known trauma, injury.          Review of Systems   Review of Systems   Constitutional:  Negative for fever.   Gastrointestinal:  Negative for abdominal pain, nausea and vomiting.   Genitourinary:  Positive for dysuria, frequency, hematuria and urgency.   Musculoskeletal:  Negative for back pain.         Current  Medications       Current Outpatient Medications:     sulfamethoxazole-trimethoprim (BACTRIM DS) 800-160 mg per tablet, Take 1 tablet by mouth every 12 (twelve) hours for 7 days, Disp: 14 tablet, Rfl: 0    amLODIPine (NORVASC) 10 mg tablet, TAKE 1 TABLET BY MOUTH EVERY DAY, Disp: 90 tablet, Rfl: 3    calcium carbonate-vitamin D (OSCAL-D) 500 mg-200 units per tablet, Take 1 tablet by mouth daily, Disp: , Rfl:     Coenzyme Q10 (COQ10) 100 MG CAPS, Take by mouth, Disp: , Rfl:     Continuous Blood Gluc Sensor (FreeStyle Zev 2 Sensor) MISC, Check blood sugars multiple times per day, Disp: 6 each, Rfl: 3    Cyanocobalamin (B-12 PO), Take by mouth, Disp: , Rfl:     latanoprost (XALATAN) 0.005 % ophthalmic solution, INSTILL 1 DROP INTO BOTH EYES AT BEDTIME. FILL ON 01-09-18, Disp: , Rfl: 0    MEGARED OMEGA-3 KRILL  MG CAPS, Take by mouth, Disp: , Rfl:     NADH POWD, Take 250 mg by mouth, Disp: , Rfl:     NON FORMULARY, 2 (two) times a day CBD OIL, Disp: , Rfl:     Saw Palmetto, Serenoa repens, 450 MG CAPS, Take by mouth (Patient not taking: Reported on 3/13/2025), Disp: , Rfl:     semaglutide, 0.25 or 0.5 mg/dose, (Ozempic, 0.25 or 0.5 MG/DOSE,) 2 mg/3 mL injection pen, Inject 0.25 mg under the skin once weekly for 28 days, THEN inject 0.5 mg under the skin once weekly, Disp: 9 mL, Rfl: 0    simvastatin (ZOCOR) 10 mg tablet, Take 1 tablet (10 mg total) by mouth daily at bedtime, Disp: 90 tablet, Rfl: 3    timolol (TIMOPTIC) 0.5 % ophthalmic solution, INSITLL 1 DROP INTO BOTH EYES EVERY MORNING, Disp: , Rfl:     Current Allergies     Allergies as of 04/04/2025 - Reviewed 04/04/2025   Allergen Reaction Noted    Lactate Diarrhea 03/15/2018            The following portions of the patient's history were reviewed and updated as appropriate: allergies, current medications, past family history, past medical history, past social history, past surgical history and problem list.     Past Medical History:   Diagnosis Date     "Cancer (HCC)     Colon polyp     Diabetes mellitus (HCC)     Hx of radiation therapy     Hypertension     Malignant neoplasm of prostate (HCC)     Shingles 02/22/2018       Past Surgical History:   Procedure Laterality Date    COLONOSCOPY      9 years ago    TX COLONOSCOPY FLX DX W/COLLJ SPEC WHEN PFRMD N/A 6/25/2018    Procedure: COLONOSCOPY;  Surgeon: Edwardo Cannon MD;  Location: [facility];  Service: Gastroenterology       Family History   Problem Relation Age of Onset    Diabetes Mother     Hypertension Mother     Heart disease Father         cardiac disorder    Diabetes Father     Hypertension Father     Prostate cancer Father     Diabetes Sister     Hypertension Sister     Multiple myeloma Brother     Bone cancer Maternal Aunt          Medications have been verified.        Objective   /74   Pulse 79   Temp (!) 97.2 °F (36.2 °C)   Resp 16   Ht 5' 10\" (1.778 m)   Wt 93 kg (205 lb)   SpO2 99%   BMI 29.41 kg/m²   No LMP recorded.       Physical Exam     Physical Exam  Pulmonary:      Effort: Pulmonary effort is normal.   Abdominal:      General: Abdomen is flat.      Tenderness: There is no abdominal tenderness. There is no right CVA tenderness or left CVA tenderness.   Genitourinary:     Prostate: Normal.      Comments: Prostate firm, NON-tender, non-enlarged. No nodularity appreciated.    Neurological:      Mental Status: Samuel is alert.                   "

## 2025-04-04 NOTE — TELEPHONE ENCOUNTER
PA for ozempic 0.25 mg/0.5 ml  APPROVED     Date(s) approved April 4, 2025 to December 31, 2025     Case #[id number]     Patient advised by          []MyChart Message  [x]Phone call unable to leave message mailbox full  []LMOM  []L/M to call office as no active Communication consent on file  []Unable to leave detailed message as VM not approved on Communication consent       Pharmacy advised by    [x]Fax  []Phone call  []Secure Chat    Specialty Pharmacy    []     Approval letter scanned into [employer] Yes

## 2025-04-06 ENCOUNTER — RESULTS FOLLOW-UP (OUTPATIENT)
Dept: URGENT CARE | Facility: CLINIC | Age: 75
End: 2025-04-06

## 2025-04-06 LAB — BACTERIA UR CULT: ABNORMAL

## 2025-04-07 DIAGNOSIS — E11.9 TYPE 2 DIABETES MELLITUS WITHOUT COMPLICATION, WITHOUT LONG-TERM CURRENT USE OF INSULIN (HCC): Primary | ICD-10-CM

## 2025-04-07 RX ORDER — DULAGLUTIDE 0.75 MG/.5ML
0.75 INJECTION, SOLUTION SUBCUTANEOUS WEEKLY
Qty: 6 ML | Refills: 1 | Status: SHIPPED | OUTPATIENT
Start: 2025-04-07 | End: 2025-04-08

## 2025-04-08 ENCOUNTER — TELEPHONE (OUTPATIENT)
Dept: FAMILY MEDICINE CLINIC | Facility: CLINIC | Age: 75
End: 2025-04-08

## 2025-04-08 DIAGNOSIS — E11.9 TYPE 2 DIABETES MELLITUS WITHOUT COMPLICATION, WITHOUT LONG-TERM CURRENT USE OF INSULIN (HCC): Primary | ICD-10-CM

## 2025-04-08 NOTE — TELEPHONE ENCOUNTER
PA is needed for the patient for Trulicity 0.75mg/0.5ml Auto-injectors.      Visit [email or web address]    Key:  [id number]    PA request scanned in.

## 2025-04-08 NOTE — TELEPHONE ENCOUNTER
I spoke with Samuel and he stated that his insurance did approve the Ozempic after all and will be picking it up today.  Can you add this back to his med list and discontinue trulicity?

## 2025-04-16 ENCOUNTER — RESULTS FOLLOW-UP (OUTPATIENT)
Dept: GENETICS | Facility: CLINIC | Age: 75
End: 2025-04-16

## 2025-04-16 LAB
GENE DIS ANL INTERP-IMP: NORMAL
GENES NOT REPORTED: NORMAL
INTERPRETATION: NORMAL

## 2025-05-05 ENCOUNTER — APPOINTMENT (OUTPATIENT)
Dept: LAB | Facility: AMBULARY SURGERY CENTER | Age: 75
End: 2025-05-05
Attending: PHYSICIAN ASSISTANT
Payer: MEDICARE

## 2025-05-05 DIAGNOSIS — C61 PROSTATE CANCER (HCC): ICD-10-CM

## 2025-05-05 LAB — PSA SERPL-MCNC: 1.82 NG/ML (ref 0–4)

## 2025-05-05 PROCEDURE — 84153 ASSAY OF PSA TOTAL: CPT

## 2025-05-05 PROCEDURE — 36415 COLL VENOUS BLD VENIPUNCTURE: CPT

## 2025-05-07 ENCOUNTER — OFFICE VISIT (OUTPATIENT)
Dept: UROLOGY | Facility: CLINIC | Age: 75
End: 2025-05-07
Payer: MEDICARE

## 2025-05-07 VITALS
BODY MASS INDEX: 31.37 KG/M2 | DIASTOLIC BLOOD PRESSURE: 70 MMHG | HEART RATE: 66 BPM | WEIGHT: 207 LBS | HEIGHT: 68 IN | SYSTOLIC BLOOD PRESSURE: 110 MMHG | OXYGEN SATURATION: 98 %

## 2025-05-07 DIAGNOSIS — C61 PROSTATE CANCER (HCC): Primary | ICD-10-CM

## 2025-05-07 PROCEDURE — 96402 CHEMO HORMON ANTINEOPL SQ/IM: CPT

## 2025-05-07 PROCEDURE — 99213 OFFICE O/P EST LOW 20 MIN: CPT | Performed by: PHYSICIAN ASSISTANT

## 2025-05-08 NOTE — ASSESSMENT & PLAN NOTE
Orders:    leuprolide (LUPRON DEPOT 6 MONTH KIT) IM injection kit 45 mg    PSA Total, Diagnostic; Future  Follow-up in 6 months with PSA prior to visit for his next injection

## 2025-05-08 NOTE — PROGRESS NOTES
"Name: Samuel Knight      : 1950      MRN: [id number]  Encounter Provider: Yuri Vicente PA-C  Encounter Date: 2025   Encounter department: [facility]  :  Assessment & Plan  Prostate cancer (HCC)    Orders:    leuprolide (LUPRON DEPOT 6 MONTH KIT) IM injection kit 45 mg    PSA Total, Diagnostic; Future  Follow-up in 6 months with PSA prior to visit for his next injection      History of Present Illness   Samuel Knight is a 75 y.o. male who presents history of metastatic prostate cancer.  He was treated in [facility] in  with radiation and ADT.  His PSA eliazar to 5.61.  PET scan showed persistent asymmetric nodule activity through the left prostate gland.  There was also persistent subtle low activity and patchy sclerosis in the right iliac bone.  Lupron 45 mg given today.  Current PSA 1.81.    Review of Systems       Objective   /70 (BP Location: Left arm, Patient Position: Sitting, Cuff Size: Adult)   Pulse 66   Ht 5' 8\" (1.727 m)   Wt 93.9 kg (207 lb)   SpO2 98%   BMI 31.47 kg/m²     Physical Exam GEN: no acute distress    RESP: breathing comfortably with no accessory muscle use    ABD: soft, non-tender, non-distended   INCISION:    EXT: no significant peripheral edema       RADIOLOGY:   none        Results   Lab Results   Component Value Date    PSA 1.817 2025    PSA 1.971 2025    PSA 1.963 10/25/2024     Lab Results   Component Value Date    CALCIUM 9.6 2025    K 3.9 2025    CO2 27 2025     2025    BUN 15 2025    CREATININE 0.71 2025     Lab Results   Component Value Date    WBC 5.59 10/25/2024    HGB 13.8 10/25/2024    HCT 45.4 10/25/2024    MCV 81 (L) 10/25/2024     10/25/2024       Office Urine Dip  No results found for this or any previous visit (from the past hour).      "

## 2025-06-13 ENCOUNTER — HOSPITAL ENCOUNTER (OUTPATIENT)
Dept: RADIOLOGY | Age: 75
Discharge: HOME | End: 2025-06-13
Payer: MEDICARE

## 2025-06-13 DIAGNOSIS — R97.20 ELEVATED PROSTATE SPECIFIC ANTIGEN (PSA): ICD-10-CM

## 2025-06-13 DIAGNOSIS — C61 NEOPLASM OF PROSTATE, DISTANT METASTASIS STAGING CATEGORY M1B: METASTASIS TO BONE (HCC): ICD-10-CM

## 2025-06-13 DIAGNOSIS — C79.51 NEOPLASM OF PROSTATE, DISTANT METASTASIS STAGING CATEGORY M1B: METASTASIS TO BONE (HCC): ICD-10-CM

## 2025-06-13 PROCEDURE — A9595 HB PIFLUFOLASTAT F-18, DIAGNOSTIC, 1 MILLICURIE: HCPCS

## 2025-06-13 PROCEDURE — 78815 PET IMAGE W/CT SKULL-THIGH: CPT

## 2025-06-16 ENCOUNTER — TELEPHONE (OUTPATIENT)
Dept: HEMATOLOGY ONCOLOGY | Facility: CLINIC | Age: 75
End: 2025-06-16

## 2025-06-16 NOTE — TELEPHONE ENCOUNTER
Pt returned call regarding message from Dr. Carpenter's team. Informed pt that he must have his labs completed prior to his appointment with Dr. Carpenter on Thursday, 6/19. Pt verbalized understanding.

## 2025-06-16 NOTE — TELEPHONE ENCOUNTER
LVM for pt in regards to obtaining lab work prior to upcoming appt w/ Dr. Carpenter on 6/19. Advised pt labs are non fasting and are in the system. Advised pt to call our office back if they are unable to obtain lab work prior to the appt or will be using a lab outside of [facility]. [facility] # [phone] provided.

## 2025-06-17 ENCOUNTER — APPOINTMENT (OUTPATIENT)
Dept: LAB | Facility: AMBULARY SURGERY CENTER | Age: 75
End: 2025-06-17
Payer: MEDICARE

## 2025-06-17 DIAGNOSIS — C61 PROSTATE CANCER (HCC): ICD-10-CM

## 2025-06-17 DIAGNOSIS — C61 NEOPLASM OF PROSTATE, DISTANT METASTASIS STAGING CATEGORY M1B: METASTASIS TO BONE (HCC): ICD-10-CM

## 2025-06-17 DIAGNOSIS — C79.51 NEOPLASM OF PROSTATE, DISTANT METASTASIS STAGING CATEGORY M1B: METASTASIS TO BONE (HCC): ICD-10-CM

## 2025-06-17 LAB — PSA SERPL-MCNC: 1.94 NG/ML (ref 0–4)

## 2025-06-17 PROCEDURE — 36415 COLL VENOUS BLD VENIPUNCTURE: CPT

## 2025-06-17 PROCEDURE — 84153 ASSAY OF PSA TOTAL: CPT

## 2025-06-19 ENCOUNTER — OFFICE VISIT (OUTPATIENT)
Dept: HEMATOLOGY ONCOLOGY | Facility: CLINIC | Age: 75
End: 2025-06-19
Payer: MEDICARE

## 2025-06-19 VITALS
DIASTOLIC BLOOD PRESSURE: 68 MMHG | OXYGEN SATURATION: 97 % | TEMPERATURE: 97.2 F | BODY MASS INDEX: 29.92 KG/M2 | RESPIRATION RATE: 16 BRPM | HEART RATE: 70 BPM | SYSTOLIC BLOOD PRESSURE: 126 MMHG | WEIGHT: 209 LBS | HEIGHT: 70 IN

## 2025-06-19 DIAGNOSIS — C79.51 NEOPLASM OF PROSTATE, DISTANT METASTASIS STAGING CATEGORY M1B: METASTASIS TO BONE (HCC): Primary | ICD-10-CM

## 2025-06-19 DIAGNOSIS — C61 NEOPLASM OF PROSTATE, DISTANT METASTASIS STAGING CATEGORY M1B: METASTASIS TO BONE (HCC): Primary | ICD-10-CM

## 2025-06-19 PROCEDURE — 99214 OFFICE O/P EST MOD 30 MIN: CPT | Performed by: INTERNAL MEDICINE

## 2025-06-19 NOTE — PROGRESS NOTES
Name: Samuel Knight      : 1950      MRN: [id number]  Encounter Provider: Angel Carpenter MD  Encounter Date: 2025   Encounter department: [facility]  :  Assessment & Plan  Neoplasm of prostate, 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 (HCC)  74-year-old -American male who was diagnosed at age 54 with prostate cancer Kami score of 6 status post radiation therapy and intermittent Lupron Lupron was discontinued later on because of sexual side effects, the initial diagnosis was done in 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     PSA was 0.7 on 10/2003     However on 2024 went up to 5.6     Liquid biopsy showed BRCA1 mutation he will be candidate for PARP inhibitor as well as androgen inhibitor in the near future     PET scan showed activity in the left aspect of the prostate possible bony lesion he received Firmagon and later on Lupron every 6 months, PSA plateauing at 1.9   Liquid biopsy showed BRCA1 mutation, seen by genetic counselor, more extensive blood test was negative for deleterious mutation    PSA 1.9, PET scan showed activity in the posterior part of the prostate, nothing to indicate change of the therapy as long as the PSA is plateauing will follow-up in 3 months with CBC, CMP, PSA    Diabetes mellitus type 2 on Ozempic  Orders:    Comprehensive metabolic panel; Future    CBC and differential; Future    PSA Total, Diagnostic; Future      Assessment & Plan        Return in about 3 months (around 2025).    History of Present Illness   Chief Complaint   Patient presents with    Follow-up     History of Present Illness    Oncology History   Cancer Staging   Neoplasm of prostate, distant metastasis staging category  "M1b: metastasis to bone (HCC)  Staging form: Prostate, AJCC 7th Edition  - Clinical: Stage IV (rM1b, Kami <= 6) - Signed by Angel Carpenter MD on 5/1/2024  Stage prefix: Recurrence  Biopsy of metastatic site performed: No  Lymph-vascular invasion (LVI): Presence of LVI unknown/indeterminate  Residual tumor (R): RX  Prostate-specific antigen (PSA) level (ng/mL): 5.6  Kami score: 6  Clinical staging procedures performed: Imaging  Oncology History   Neoplasm of prostate, 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 (HCC)   11/7/2017 Initial Diagnosis    Neoplasm of prostate, 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 (HCC)     5/1/2024 -  Cancer Staged    Staging form: Prostate, AJCC 7th Edition  - Clinical: Stage IV (M1b, [place] <= 6) - Signed by Angel Carpenter MD on 5/1/2024  Stage prefix: Recurrence  Biopsy of metastatic site performed: No  Lymph-vascular invasion (LVI): Presence of LVI unknown/indeterminate  Residual tumor (R): RX - Cannot be assessed  Prostate-specific antigen (PSA) level (ng/mL): 5.6  [place] score: 6  Clinical staging procedures performed: Imaging               Review of Systems   Constitutional:  Negative for chills and fever.   HENT:  Negative for ear pain and sore throat.    Eyes:  Negative for pain and visual disturbance.   Respiratory:  Negative for cough and shortness of breath.    Cardiovascular:  Negative for chest pain and palpitations.   Gastrointestinal:  Negative for abdominal pain and vomiting.   Genitourinary:  Negative for dysuria and hematuria.   Musculoskeletal:  Negative for arthralgias and back pain.   Skin:  Negative for color change and rash.   Neurological:  Negative for seizures and syncope.   All other systems reviewed and are negative.          Objective   /68 (BP Location: Left arm, Patient Position: Sitting, Cuff Size: Large)   Pulse 70   Temp (!) 97.2 °F (36.2 °C) (Temporal)   Resp 16   Ht 5' 10\" (1.778 m)   Wt 94.8 kg " (209 lb)   SpO2 97%   BMI 29.99 kg/m²     Pain Screening:  Pain Score: 0-No pain  ECOG   0  Physical Exam  Vitals reviewed.   Constitutional:       General: He is not in acute distress.     Appearance: He is well-developed. He is not diaphoretic.   HENT:      Head: Normocephalic and atraumatic.     Eyes:      Conjunctiva/sclera: Conjunctivae normal.     Neck:      Trachea: No tracheal deviation.     Cardiovascular:      Rate and Rhythm: Normal rate and regular rhythm.      Heart sounds: No murmur heard.     No friction rub. No gallop.   Pulmonary:      Effort: Pulmonary effort is normal. No respiratory distress.      Breath sounds: No decreased air movement. No decreased breath sounds, wheezing or rales.   Chest:      Chest wall: No tenderness.   Abdominal:      General: There is no distension.      Palpations: Abdomen is soft. There is no hepatomegaly or splenomegaly.      Tenderness: There is no abdominal tenderness.     Musculoskeletal:      Cervical back: Normal range of motion and neck supple.      Right lower leg: No edema.      Left lower leg: No edema.   Lymphadenopathy:      Head:      Right side of head: No submental or submandibular adenopathy.      Left side of head: No submental or submandibular adenopathy.      Cervical: No cervical adenopathy.      Upper Body:      Right upper body: No supraclavicular or axillary adenopathy.      Left upper body: No supraclavicular or axillary adenopathy.      Lower Body: No right inguinal adenopathy. No left inguinal adenopathy.     Skin:     General: Skin is warm and dry.      Coloration: Skin is not pale.      Findings: No erythema or rash.     Neurological:      General: No focal deficit present.      Mental Status: He is alert and oriented to person, place, and time.     Psychiatric:         Behavior: Behavior normal.         Thought Content: Thought content normal.         Judgment: Judgment normal.       Physical Exam      Results    Labs: I have reviewed the  following labs:  Lab Results   Component Value Date/Time    WBC 5.59 10/25/2024 07:58 AM    RBC 5.64 (H) 10/25/2024 07:58 AM    Hemoglobin 13.8 10/25/2024 07:58 AM    Hematocrit 45.4 10/25/2024 07:58 AM    MCV 81 (L) 10/25/2024 07:58 AM    MCH 24.5 (L) 10/25/2024 07:58 AM    RDW 15.3 (H) 10/25/2024 07:58 AM    Platelets 217 10/25/2024 07:58 AM     Lab Results   Component Value Date/Time    Potassium 3.9 03/31/2025 07:19 AM    Chloride 104 03/31/2025 07:19 AM    CO2 27 03/31/2025 07:19 AM    BUN 15 03/31/2025 07:19 AM    Creatinine 0.71 03/31/2025 07:19 AM    Glucose, Fasting 120 (H) 03/31/2025 07:19 AM    Calcium 9.6 03/31/2025 07:19 AM    AST 28 03/31/2025 07:19 AM    ALT 13 03/31/2025 07:19 AM    Alkaline Phosphatase 119 (H) 03/31/2025 07:19 AM    Total Protein 7.4 03/31/2025 07:19 AM    Albumin 4.6 03/31/2025 07:19 AM    Total Bilirubin 0.63 03/31/2025 07:19 AM

## 2025-06-19 NOTE — ASSESSMENT & PLAN NOTE
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     PSA was 0.7 on 10/2003     However on 4/2024 went up to 5.6     Liquid biopsy showed BRCA1 mutation he will be candidate for PARP inhibitor as well as androgen inhibitor in the near future     PET scan showed activity in the left aspect of the prostate possible bony lesion he received Firmagon and later on Lupron every 6 months, PSA plateauing at 1.9   Liquid biopsy showed BRCA1 mutation, seen by genetic counselor, more extensive blood test was negative for deleterious mutation    PSA 1.9, PET scan showed activity in the posterior part of the prostate, nothing to indicate change of the therapy as long as the PSA is plateauing will follow-up in 3 months with CBC, CMP, PSA    Diabetes mellitus type 2 on Ozempic  Orders:    Comprehensive metabolic panel; Future    CBC and differential; Future    PSA Total, Diagnostic; Future

## 2025-06-25 DIAGNOSIS — E11.9 TYPE 2 DIABETES MELLITUS WITHOUT COMPLICATION, WITHOUT LONG-TERM CURRENT USE OF INSULIN (HCC): ICD-10-CM

## 2025-06-26 DIAGNOSIS — E11.9 TYPE 2 DIABETES MELLITUS WITHOUT COMPLICATION, WITHOUT LONG-TERM CURRENT USE OF INSULIN (HCC): ICD-10-CM

## 2025-08-08 ENCOUNTER — APPOINTMENT (OUTPATIENT)
Dept: LAB | Facility: AMBULARY SURGERY CENTER | Age: 75
End: 2025-08-08
Payer: MEDICARE

## 2025-08-08 LAB
ALBUMIN SERPL BCG-MCNC: 4.2 G/DL (ref 3.5–5)
ALP SERPL-CCNC: 116 U/L (ref 34–104)
ALT SERPL W P-5'-P-CCNC: 13 U/L (ref 7–52)
ANION GAP SERPL CALCULATED.3IONS-SCNC: 8 MMOL/L (ref 4–13)
AST SERPL W P-5'-P-CCNC: 18 U/L (ref 13–39)
BASOPHILS # BLD AUTO: 0.03 THOUSANDS/ÂΜL (ref 0–0.1)
BASOPHILS NFR BLD AUTO: 1 % (ref 0–1)
BILIRUB SERPL-MCNC: 0.64 MG/DL (ref 0.2–1)
BUN SERPL-MCNC: 17 MG/DL (ref 5–25)
CALCIUM SERPL-MCNC: 9.3 MG/DL (ref 8.4–10.2)
CHLORIDE SERPL-SCNC: 102 MMOL/L (ref 96–108)
CO2 SERPL-SCNC: 29 MMOL/L (ref 21–32)
CREAT SERPL-MCNC: 0.74 MG/DL (ref 0.6–1.3)
EOSINOPHIL # BLD AUTO: 0.07 THOUSAND/ÂΜL (ref 0–0.61)
EOSINOPHIL NFR BLD AUTO: 2 % (ref 0–6)
ERYTHROCYTE [DISTWIDTH] IN BLOOD BY AUTOMATED COUNT: 15.5 % (ref 11.6–15.1)
GFR SERPL CREATININE-BSD FRML MDRD: 90 ML/MIN/1.73SQ M
GLUCOSE P FAST SERPL-MCNC: 97 MG/DL (ref 65–99)
HCT VFR BLD AUTO: 43.5 % (ref 36.5–49.3)
HGB BLD-MCNC: 13.5 G/DL (ref 12–17)
IMM GRANULOCYTES # BLD AUTO: 0.01 THOUSAND/UL (ref 0–0.2)
IMM GRANULOCYTES NFR BLD AUTO: 0 % (ref 0–2)
LYMPHOCYTES # BLD AUTO: 1.51 THOUSANDS/ÂΜL (ref 0.6–4.47)
LYMPHOCYTES NFR BLD AUTO: 33 % (ref 14–44)
MCH RBC QN AUTO: 24.4 PG (ref 26.8–34.3)
MCHC RBC AUTO-ENTMCNC: 31 G/DL (ref 31.4–37.4)
MCV RBC AUTO: 79 FL (ref 82–98)
MONOCYTES # BLD AUTO: 0.56 THOUSAND/ÂΜL (ref 0.17–1.22)
MONOCYTES NFR BLD AUTO: 12 % (ref 4–12)
NEUTROPHILS # BLD AUTO: 2.39 THOUSANDS/ÂΜL (ref 1.85–7.62)
NEUTS SEG NFR BLD AUTO: 52 % (ref 43–75)
NRBC BLD AUTO-RTO: 0 /100 WBCS
PLATELET # BLD AUTO: 208 THOUSANDS/UL (ref 149–390)
PMV BLD AUTO: 10.5 FL (ref 8.9–12.7)
POTASSIUM SERPL-SCNC: 3.7 MMOL/L (ref 3.5–5.3)
PROT SERPL-MCNC: 7.2 G/DL (ref 6.4–8.4)
RBC # BLD AUTO: 5.53 MILLION/UL (ref 3.88–5.62)
SODIUM SERPL-SCNC: 139 MMOL/L (ref 135–147)
WBC # BLD AUTO: 4.57 THOUSAND/UL (ref 4.31–10.16)

## 2025-08-12 ENCOUNTER — OFFICE VISIT (OUTPATIENT)
Dept: FAMILY MEDICINE CLINIC | Facility: CLINIC | Age: 75
End: 2025-08-12
Payer: MEDICARE

## 2025-08-12 PROBLEM — G89.29 CHRONIC HEEL PAIN, LEFT: Status: ACTIVE | Noted: 2025-08-12

## 2025-08-12 PROBLEM — E11.36 TYPE 2 DIABETES MELLITUS WITH DIABETIC CATARACT, WITHOUT LONG-TERM CURRENT USE OF INSULIN (HCC): Status: ACTIVE | Noted: 2025-08-12

## 2025-08-12 PROBLEM — M79.672 CHRONIC HEEL PAIN, LEFT: Status: ACTIVE | Noted: 2025-08-12
